# Patient Record
Sex: FEMALE | Race: BLACK OR AFRICAN AMERICAN | NOT HISPANIC OR LATINO
[De-identification: names, ages, dates, MRNs, and addresses within clinical notes are randomized per-mention and may not be internally consistent; named-entity substitution may affect disease eponyms.]

---

## 2017-04-07 ENCOUNTER — APPOINTMENT (OUTPATIENT)
Dept: INTERNAL MEDICINE | Facility: CLINIC | Age: 62
End: 2017-04-07

## 2017-04-07 VITALS
DIASTOLIC BLOOD PRESSURE: 92 MMHG | BODY MASS INDEX: 41.44 KG/M2 | HEIGHT: 67 IN | WEIGHT: 264 LBS | HEART RATE: 72 BPM | SYSTOLIC BLOOD PRESSURE: 172 MMHG

## 2017-04-27 LAB
25(OH)D3 SERPL-MCNC: 22.5 NG/ML
ALBUMIN SERPL ELPH-MCNC: 3.8 G/DL
ALP BLD-CCNC: 77 U/L
ALT SERPL-CCNC: 11 U/L
ANION GAP SERPL CALC-SCNC: 16 MMOL/L
AST SERPL-CCNC: 17 U/L
BASOPHILS # BLD AUTO: 0.01 K/UL
BASOPHILS NFR BLD AUTO: 0.1 %
BILIRUB SERPL-MCNC: 0.2 MG/DL
BUN SERPL-MCNC: 30 MG/DL
CALCIUM SERPL-MCNC: 10.1 MG/DL
CHLORIDE SERPL-SCNC: 103 MMOL/L
CHOLEST SERPL-MCNC: 194 MG/DL
CHOLEST/HDLC SERPL: 2.2 RATIO
CO2 SERPL-SCNC: 22 MMOL/L
CREAT SERPL-MCNC: 1.36 MG/DL
EOSINOPHIL # BLD AUTO: 0.19 K/UL
EOSINOPHIL NFR BLD AUTO: 2.1 %
GLUCOSE SERPL-MCNC: 82 MG/DL
HBA1C MFR BLD HPLC: 5.8 %
HCT VFR BLD CALC: 37.7 %
HDLC SERPL-MCNC: 90 MG/DL
HGB BLD-MCNC: 12 G/DL
IMM GRANULOCYTES NFR BLD AUTO: 0.2 %
LDLC SERPL CALC-MCNC: 87 MG/DL
LYMPHOCYTES # BLD AUTO: 2.64 K/UL
LYMPHOCYTES NFR BLD AUTO: 29 %
MAN DIFF?: NORMAL
MCHC RBC-ENTMCNC: 30.4 PG
MCHC RBC-ENTMCNC: 31.8 GM/DL
MCV RBC AUTO: 95.4 FL
MONOCYTES # BLD AUTO: 0.53 K/UL
MONOCYTES NFR BLD AUTO: 5.8 %
NEUTROPHILS # BLD AUTO: 5.7 K/UL
NEUTROPHILS NFR BLD AUTO: 62.8 %
PLATELET # BLD AUTO: 302 K/UL
POTASSIUM SERPL-SCNC: 3.6 MMOL/L
PROT SERPL-MCNC: 8.3 G/DL
RBC # BLD: 3.95 M/UL
RBC # FLD: 14.5 %
SODIUM SERPL-SCNC: 141 MMOL/L
T4 FREE SERPL-MCNC: 1.6 NG/DL
TRIGL SERPL-MCNC: 83 MG/DL
TSH SERPL-ACNC: 0.72 UIU/ML
WBC # FLD AUTO: 9.09 K/UL

## 2017-06-16 ENCOUNTER — INPATIENT (INPATIENT)
Facility: HOSPITAL | Age: 62
LOS: 3 days | Discharge: HOME | End: 2017-06-20
Attending: SPECIALIST | Admitting: SPECIALIST
Payer: COMMERCIAL

## 2017-06-16 VITALS
OXYGEN SATURATION: 100 % | DIASTOLIC BLOOD PRESSURE: 77 MMHG | RESPIRATION RATE: 16 BRPM | HEART RATE: 69 BPM | TEMPERATURE: 98 F | SYSTOLIC BLOOD PRESSURE: 152 MMHG

## 2017-06-16 DIAGNOSIS — K56.69 OTHER INTESTINAL OBSTRUCTION: ICD-10-CM

## 2017-06-16 LAB
ALBUMIN SERPL ELPH-MCNC: 4 G/DL — SIGNIFICANT CHANGE UP (ref 3.3–5)
ALP SERPL-CCNC: 60 U/L — SIGNIFICANT CHANGE UP (ref 40–120)
ALT FLD-CCNC: 17 U/L — SIGNIFICANT CHANGE UP (ref 4–33)
APPEARANCE UR: CLEAR — SIGNIFICANT CHANGE UP
AST SERPL-CCNC: 20 U/L — SIGNIFICANT CHANGE UP (ref 4–32)
BASE EXCESS BLDV CALC-SCNC: 2.8 MMOL/L — SIGNIFICANT CHANGE UP
BASOPHILS # BLD AUTO: 0.01 K/UL — SIGNIFICANT CHANGE UP (ref 0–0.2)
BASOPHILS NFR BLD AUTO: 0.1 % — SIGNIFICANT CHANGE UP (ref 0–2)
BILIRUB SERPL-MCNC: 0.3 MG/DL — SIGNIFICANT CHANGE UP (ref 0.2–1.2)
BILIRUB UR-MCNC: NEGATIVE — SIGNIFICANT CHANGE UP
BLOOD GAS VENOUS - CREATININE: 1.3 MG/DL — SIGNIFICANT CHANGE UP (ref 0.5–1.3)
BLOOD UR QL VISUAL: NEGATIVE — SIGNIFICANT CHANGE UP
BUN SERPL-MCNC: 15 MG/DL — SIGNIFICANT CHANGE UP (ref 7–23)
CALCIUM SERPL-MCNC: 9.7 MG/DL — SIGNIFICANT CHANGE UP (ref 8.4–10.5)
CHLORIDE BLDV-SCNC: 105 MMOL/L — SIGNIFICANT CHANGE UP (ref 96–108)
CHLORIDE SERPL-SCNC: 101 MMOL/L — SIGNIFICANT CHANGE UP (ref 98–107)
CO2 SERPL-SCNC: 22 MMOL/L — SIGNIFICANT CHANGE UP (ref 22–31)
COLOR SPEC: YELLOW — SIGNIFICANT CHANGE UP
CREAT SERPL-MCNC: 1.26 MG/DL — SIGNIFICANT CHANGE UP (ref 0.5–1.3)
EOSINOPHIL # BLD AUTO: 0 K/UL — SIGNIFICANT CHANGE UP (ref 0–0.5)
EOSINOPHIL NFR BLD AUTO: 0 % — SIGNIFICANT CHANGE UP (ref 0–6)
GAS PNL BLDV: 139 MMOL/L — SIGNIFICANT CHANGE UP (ref 136–146)
GLUCOSE BLDV-MCNC: 123 — HIGH (ref 70–99)
GLUCOSE SERPL-MCNC: 121 MG/DL — HIGH (ref 70–99)
GLUCOSE UR-MCNC: NEGATIVE — SIGNIFICANT CHANGE UP
HCO3 BLDV-SCNC: 25 MMOL/L — SIGNIFICANT CHANGE UP (ref 20–27)
HCT VFR BLD CALC: 41.3 % — SIGNIFICANT CHANGE UP (ref 34.5–45)
HCT VFR BLDV CALC: 35.4 % — SIGNIFICANT CHANGE UP (ref 34.5–45)
HGB BLD-MCNC: 13 G/DL — SIGNIFICANT CHANGE UP (ref 11.5–15.5)
HGB BLDV-MCNC: 11.5 G/DL — SIGNIFICANT CHANGE UP (ref 11.5–15.5)
IMM GRANULOCYTES NFR BLD AUTO: 0.3 % — SIGNIFICANT CHANGE UP (ref 0–1.5)
KETONES UR-MCNC: NEGATIVE — SIGNIFICANT CHANGE UP
LACTATE BLDV-MCNC: 2.5 MMOL/L — HIGH (ref 0.5–2)
LEUKOCYTE ESTERASE UR-ACNC: NEGATIVE — SIGNIFICANT CHANGE UP
LIDOCAIN IGE QN: 66.8 U/L — HIGH (ref 7–60)
LYMPHOCYTES # BLD AUTO: 1.1 K/UL — SIGNIFICANT CHANGE UP (ref 1–3.3)
LYMPHOCYTES # BLD AUTO: 9.5 % — LOW (ref 13–44)
MCHC RBC-ENTMCNC: 29.8 PG — SIGNIFICANT CHANGE UP (ref 27–34)
MCHC RBC-ENTMCNC: 31.5 % — LOW (ref 32–36)
MCV RBC AUTO: 94.7 FL — SIGNIFICANT CHANGE UP (ref 80–100)
MONOCYTES # BLD AUTO: 0.16 K/UL — SIGNIFICANT CHANGE UP (ref 0–0.9)
MONOCYTES NFR BLD AUTO: 1.4 % — LOW (ref 2–14)
NEUTROPHILS # BLD AUTO: 10.31 K/UL — HIGH (ref 1.8–7.4)
NEUTROPHILS NFR BLD AUTO: 88.7 % — HIGH (ref 43–77)
NITRITE UR-MCNC: NEGATIVE — SIGNIFICANT CHANGE UP
PCO2 BLDV: 46 MMHG — SIGNIFICANT CHANGE UP (ref 41–51)
PH BLDV: 7.39 PH — SIGNIFICANT CHANGE UP (ref 7.32–7.43)
PH UR: 7 — SIGNIFICANT CHANGE UP (ref 4.6–8)
PLATELET # BLD AUTO: 307 K/UL — SIGNIFICANT CHANGE UP (ref 150–400)
PMV BLD: 11.3 FL — SIGNIFICANT CHANGE UP (ref 7–13)
PO2 BLDV: 26 MMHG — LOW (ref 35–40)
POTASSIUM BLDV-SCNC: 3.5 MMOL/L — SIGNIFICANT CHANGE UP (ref 3.4–4.5)
POTASSIUM SERPL-MCNC: 3.6 MMOL/L — SIGNIFICANT CHANGE UP (ref 3.5–5.3)
POTASSIUM SERPL-SCNC: 3.6 MMOL/L — SIGNIFICANT CHANGE UP (ref 3.5–5.3)
PROT SERPL-MCNC: 8.7 G/DL — HIGH (ref 6–8.3)
PROT UR-MCNC: 20 — SIGNIFICANT CHANGE UP
RBC # BLD: 4.36 M/UL — SIGNIFICANT CHANGE UP (ref 3.8–5.2)
RBC # FLD: 14.2 % — SIGNIFICANT CHANGE UP (ref 10.3–14.5)
RBC CASTS # UR COMP ASSIST: SIGNIFICANT CHANGE UP (ref 0–?)
SAO2 % BLDV: 40.9 % — LOW (ref 60–85)
SODIUM SERPL-SCNC: 137 MMOL/L — SIGNIFICANT CHANGE UP (ref 135–145)
SP GR SPEC: 1.02 — SIGNIFICANT CHANGE UP (ref 1–1.03)
SQUAMOUS # UR AUTO: SIGNIFICANT CHANGE UP
UROBILINOGEN FLD QL: NORMAL E.U. — SIGNIFICANT CHANGE UP (ref 0.1–0.2)
WBC # BLD: 11.61 K/UL — HIGH (ref 3.8–10.5)
WBC # FLD AUTO: 11.61 K/UL — HIGH (ref 3.8–10.5)
WBC UR QL: SIGNIFICANT CHANGE UP (ref 0–?)

## 2017-06-16 PROCEDURE — 71010: CPT | Mod: 26

## 2017-06-16 PROCEDURE — 74176 CT ABD & PELVIS W/O CONTRAST: CPT | Mod: 26

## 2017-06-16 RX ORDER — MORPHINE SULFATE 50 MG/1
2 CAPSULE, EXTENDED RELEASE ORAL ONCE
Qty: 0 | Refills: 0 | Status: DISCONTINUED | OUTPATIENT
Start: 2017-06-16 | End: 2017-06-16

## 2017-06-16 RX ORDER — LEVOTHYROXINE SODIUM 125 MCG
55 TABLET ORAL DAILY
Qty: 0 | Refills: 0 | Status: DISCONTINUED | OUTPATIENT
Start: 2017-06-16 | End: 2017-06-17

## 2017-06-16 RX ORDER — ENOXAPARIN SODIUM 100 MG/ML
40 INJECTION SUBCUTANEOUS DAILY
Qty: 0 | Refills: 0 | Status: DISCONTINUED | OUTPATIENT
Start: 2017-06-16 | End: 2017-06-20

## 2017-06-16 RX ORDER — SODIUM CHLORIDE 9 MG/ML
1000 INJECTION, SOLUTION INTRAVENOUS
Qty: 0 | Refills: 0 | Status: DISCONTINUED | OUTPATIENT
Start: 2017-06-16 | End: 2017-06-17

## 2017-06-16 RX ORDER — ONDANSETRON 8 MG/1
4 TABLET, FILM COATED ORAL ONCE
Qty: 0 | Refills: 0 | Status: DISCONTINUED | OUTPATIENT
Start: 2017-06-16 | End: 2017-06-16

## 2017-06-16 RX ORDER — SODIUM CHLORIDE 9 MG/ML
1000 INJECTION, SOLUTION INTRAVENOUS
Qty: 0 | Refills: 0 | Status: DISCONTINUED | OUTPATIENT
Start: 2017-06-16 | End: 2017-06-16

## 2017-06-16 RX ORDER — SODIUM CHLORIDE 9 MG/ML
1000 INJECTION INTRAMUSCULAR; INTRAVENOUS; SUBCUTANEOUS ONCE
Qty: 0 | Refills: 0 | Status: COMPLETED | OUTPATIENT
Start: 2017-06-16 | End: 2017-06-16

## 2017-06-16 RX ADMIN — SODIUM CHLORIDE 2000 MILLILITER(S): 9 INJECTION INTRAMUSCULAR; INTRAVENOUS; SUBCUTANEOUS at 09:51

## 2017-06-16 RX ADMIN — ENOXAPARIN SODIUM 40 MILLIGRAM(S): 100 INJECTION SUBCUTANEOUS at 16:24

## 2017-06-16 RX ADMIN — SODIUM CHLORIDE 100 MILLILITER(S): 9 INJECTION, SOLUTION INTRAVENOUS at 16:24

## 2017-06-16 RX ADMIN — MORPHINE SULFATE 2 MILLIGRAM(S): 50 CAPSULE, EXTENDED RELEASE ORAL at 16:15

## 2017-06-16 RX ADMIN — MORPHINE SULFATE 2 MILLIGRAM(S): 50 CAPSULE, EXTENDED RELEASE ORAL at 12:05

## 2017-06-16 NOTE — ED PROVIDER NOTE - ATTENDING CONTRIBUTION TO CARE
NETO Attending Note - Dr. Dye  62 F w hx of multiple SBO presents w 1 d of severe sharp lower abd pain. Intermittent. Had large emesis this am with abdominal distension similar to past episodes of SBO.  PE: pt is alert and oriented, perrl, ent normal, membranes are moist, neck supple. no lymphadenopathy or thyroid enlargement, No JVD.  Chest clear to P&A, Heart- reg rhythm without murmur, rubs or gallops, radial pulses equal bilaterally.  Abd is soft,distended diffuse tenderness., Bowel sounds are active. no mass or organomegaly. : No CVA tenderness. Neuro:  Pt alert and oriented x 3. Perrl    Distal neurosensory is intact. Motor function is 5/5 strength bilaterally.  No focal deficits. Extremities:  No edema.  Skin: warm and dry.

## 2017-06-16 NOTE — ED ADULT NURSE NOTE - OBJECTIVE STATEMENT
Pt presents to room 26, A&Ox3, ambulatory at baseline without assistance, coming in for evaluation of diffuse abdominal pain that woke her from sleep at 11am yesterday. Reports hx of frequent sbos (7 total, last one ) requiring ng tubes for management, has not require surgical intervention.  Hx of hysterectomy, laparoscopy, partial nephrectomy,  and HTN. Last bowel movement 1 hour ago, one episode of  vomiting earlier this morning.  Last po intake 14 hrs ago.  IV established in right hand with a 22g, labs drawn and sent, call bell in reach, side rails up, bed in locked position, md evaluation in progress, will continue to monitor.

## 2017-06-16 NOTE — H&P ADULT - NSHPPHYSICALEXAM_GEN_ALL_CORE
PHYSICAL EXAM:      Constitutional:  Patient lying in bed comfortably.    Respiratory:  No tachypnea or accessory muscle use    Cardiovascular:  Regular rate    Gastrointestinal:  Abd is obese, mildly distended, TTP in B/L lower quadrants.    Extremities:  Moving all four extremities spontaneously    Vascular:    Neurological:  Moving all four extremities spontaneously    Skin:  No erthema    Lymph Nodes:    Musculoskeletal:  Full ROM of extremities    Psychiatric:  Patient crying during exam.

## 2017-06-16 NOTE — H&P ADULT - HISTORY OF PRESENT ILLNESS
Patient is a 62 year old female with a PMH of multiple SBOs that have been managed non-operatively who is presenting with abdominal pain since yesterday morning and pain for one day.  She also vomited one time this morning.  Her last BM was earlier today (she had two) and she passed gas earlier this AM as well.  The pain is located in b/l lower quadrants.  She states that this pain is similar to the pain she has had in the past when she had SBOs.  She denies having any fevers or chills.

## 2017-06-16 NOTE — ED ADULT TRIAGE NOTE - CHIEF COMPLAINT QUOTE
Patient c/o abdominal pain since yesterday with abdominal distention. N/V x1 this morning. LBM this morning but with difficulty. Has a history of SBO with NGT placement. Patient c/o abdominal pain since yesterday with abdominal distention. N/V x1 this morning. LBM this morning but with difficulty. Has a history of SBO with NGT placement. Appears uncomfortable.

## 2017-06-16 NOTE — ED ADULT NURSE NOTE - CHIEF COMPLAINT QUOTE
Patient c/o abdominal pain since yesterday with abdominal distention. N/V x1 this morning. LBM this morning but with difficulty. Has a history of SBO with NGT placement. Appears uncomfortable.

## 2017-06-16 NOTE — H&P ADULT - ASSESSMENT
Patient is a 62 year old female with SBO.  -NPO  -IVFs  -NGT to low continous suction  -No Pain medications  -Serial abdominal exams  -Lovenox for VTE px.

## 2017-06-16 NOTE — ED PROVIDER NOTE - OBJECTIVE STATEMENT
62 F w hx of multiple SBO presents w 1 d of severe sharp lower abd pain. Intermittent. Had large emesis this am. Last flatus and stool were 25 min ago. Denies fevers chills. Last admit for SBO was in Sept. This feels the same but lower down. Gets dizzy when she stands up. Last meal yesterday evening.

## 2017-06-16 NOTE — ED ADULT NURSE NOTE - PMH
Adult hypothyroidism  s/p radioactive iodine for grave's, 1996  History of kidney cancer  s/p partial nephrectomy, denies chemo and radiation  Hypertension    Osteoarthritis    Small bowel obstruction  4/2016 tx with ngt

## 2017-06-17 LAB
BASOPHILS # BLD AUTO: 0 K/UL — SIGNIFICANT CHANGE UP (ref 0–0.2)
BASOPHILS NFR BLD AUTO: 0 % — SIGNIFICANT CHANGE UP (ref 0–2)
BUN SERPL-MCNC: 13 MG/DL — SIGNIFICANT CHANGE UP (ref 7–23)
CALCIUM SERPL-MCNC: 9.3 MG/DL — SIGNIFICANT CHANGE UP (ref 8.4–10.5)
CHLORIDE SERPL-SCNC: 99 MMOL/L — SIGNIFICANT CHANGE UP (ref 98–107)
CO2 SERPL-SCNC: 24 MMOL/L — SIGNIFICANT CHANGE UP (ref 22–31)
CREAT SERPL-MCNC: 1.23 MG/DL — SIGNIFICANT CHANGE UP (ref 0.5–1.3)
EOSINOPHIL # BLD AUTO: 0.03 K/UL — SIGNIFICANT CHANGE UP (ref 0–0.5)
EOSINOPHIL NFR BLD AUTO: 0.3 % — SIGNIFICANT CHANGE UP (ref 0–6)
GLUCOSE SERPL-MCNC: 79 MG/DL — SIGNIFICANT CHANGE UP (ref 70–99)
HCT VFR BLD CALC: 39.5 % — SIGNIFICANT CHANGE UP (ref 34.5–45)
HGB BLD-MCNC: 12.4 G/DL — SIGNIFICANT CHANGE UP (ref 11.5–15.5)
IMM GRANULOCYTES NFR BLD AUTO: 0.1 % — SIGNIFICANT CHANGE UP (ref 0–1.5)
LACTATE SERPL-SCNC: 1.9 MMOL/L — SIGNIFICANT CHANGE UP (ref 0.5–2)
LYMPHOCYTES # BLD AUTO: 2.14 K/UL — SIGNIFICANT CHANGE UP (ref 1–3.3)
LYMPHOCYTES # BLD AUTO: 20.4 % — SIGNIFICANT CHANGE UP (ref 13–44)
MAGNESIUM SERPL-MCNC: 2 MG/DL — SIGNIFICANT CHANGE UP (ref 1.6–2.6)
MCHC RBC-ENTMCNC: 29.4 PG — SIGNIFICANT CHANGE UP (ref 27–34)
MCHC RBC-ENTMCNC: 31.4 % — LOW (ref 32–36)
MCV RBC AUTO: 93.6 FL — SIGNIFICANT CHANGE UP (ref 80–100)
MONOCYTES # BLD AUTO: 0.84 K/UL — SIGNIFICANT CHANGE UP (ref 0–0.9)
MONOCYTES NFR BLD AUTO: 8 % — SIGNIFICANT CHANGE UP (ref 2–14)
NEUTROPHILS # BLD AUTO: 7.46 K/UL — HIGH (ref 1.8–7.4)
NEUTROPHILS NFR BLD AUTO: 71.2 % — SIGNIFICANT CHANGE UP (ref 43–77)
PHOSPHATE SERPL-MCNC: 3.2 MG/DL — SIGNIFICANT CHANGE UP (ref 2.5–4.5)
PLATELET # BLD AUTO: 306 K/UL — SIGNIFICANT CHANGE UP (ref 150–400)
PMV BLD: 10.9 FL — SIGNIFICANT CHANGE UP (ref 7–13)
POTASSIUM SERPL-MCNC: 3.5 MMOL/L — SIGNIFICANT CHANGE UP (ref 3.5–5.3)
POTASSIUM SERPL-SCNC: 3.5 MMOL/L — SIGNIFICANT CHANGE UP (ref 3.5–5.3)
RBC # BLD: 4.22 M/UL — SIGNIFICANT CHANGE UP (ref 3.8–5.2)
RBC # FLD: 14.3 % — SIGNIFICANT CHANGE UP (ref 10.3–14.5)
SODIUM SERPL-SCNC: 141 MMOL/L — SIGNIFICANT CHANGE UP (ref 135–145)
WBC # BLD: 10.48 K/UL — SIGNIFICANT CHANGE UP (ref 3.8–10.5)
WBC # FLD AUTO: 10.48 K/UL — SIGNIFICANT CHANGE UP (ref 3.8–10.5)

## 2017-06-17 PROCEDURE — 71010: CPT | Mod: 26

## 2017-06-17 RX ORDER — LEVOTHYROXINE SODIUM 125 MCG
100 TABLET ORAL DAILY
Qty: 0 | Refills: 0 | Status: DISCONTINUED | OUTPATIENT
Start: 2017-06-17 | End: 2017-06-19

## 2017-06-17 RX ORDER — INFLUENZA VIRUS VACCINE 15; 15; 15; 15 UG/.5ML; UG/.5ML; UG/.5ML; UG/.5ML
0.5 SUSPENSION INTRAMUSCULAR ONCE
Qty: 0 | Refills: 0 | Status: DISCONTINUED | OUTPATIENT
Start: 2017-06-17 | End: 2017-06-20

## 2017-06-17 RX ORDER — MORPHINE SULFATE 50 MG/1
2 CAPSULE, EXTENDED RELEASE ORAL ONCE
Qty: 0 | Refills: 0 | Status: DISCONTINUED | OUTPATIENT
Start: 2017-06-17 | End: 2017-06-17

## 2017-06-17 RX ORDER — DEXTROSE MONOHYDRATE, SODIUM CHLORIDE, AND POTASSIUM CHLORIDE 50; .745; 4.5 G/1000ML; G/1000ML; G/1000ML
1000 INJECTION, SOLUTION INTRAVENOUS
Qty: 0 | Refills: 0 | Status: DISCONTINUED | OUTPATIENT
Start: 2017-06-17 | End: 2017-06-19

## 2017-06-17 RX ORDER — PANTOPRAZOLE SODIUM 20 MG/1
40 TABLET, DELAYED RELEASE ORAL DAILY
Qty: 0 | Refills: 0 | Status: DISCONTINUED | OUTPATIENT
Start: 2017-06-17 | End: 2017-06-19

## 2017-06-17 RX ADMIN — Medication 100 MICROGRAM(S): at 07:25

## 2017-06-17 RX ADMIN — DEXTROSE MONOHYDRATE, SODIUM CHLORIDE, AND POTASSIUM CHLORIDE 100 MILLILITER(S): 50; .745; 4.5 INJECTION, SOLUTION INTRAVENOUS at 18:10

## 2017-06-17 RX ADMIN — MORPHINE SULFATE 2 MILLIGRAM(S): 50 CAPSULE, EXTENDED RELEASE ORAL at 10:20

## 2017-06-17 RX ADMIN — ENOXAPARIN SODIUM 40 MILLIGRAM(S): 100 INJECTION SUBCUTANEOUS at 12:30

## 2017-06-17 RX ADMIN — SODIUM CHLORIDE 100 MILLILITER(S): 9 INJECTION, SOLUTION INTRAVENOUS at 08:50

## 2017-06-17 RX ADMIN — MORPHINE SULFATE 2 MILLIGRAM(S): 50 CAPSULE, EXTENDED RELEASE ORAL at 09:56

## 2017-06-17 NOTE — PROGRESS NOTE ADULT - SUBJECTIVE AND OBJECTIVE BOX
SURGERY DAILY PROGRESS NOTE:     Hospital Day: 2    Postoperative Day: x    Status post: x    Subjective:  NGT dislodged and replaced x 2.   Continues to complain on pain in abdomen.  Mild nausea, no vomiting.  No flatus or bowel movement.        Objective:    MEDICATIONS  (STANDING):  enoxaparin Injectable 40milliGRAM(s) SubCutaneous daily  levothyroxine Injectable 100MICROGram(s) IV Push daily  influenza   Vaccine 0.5milliLiter(s) IntraMuscular once  dextrose 5% + sodium chloride 0.45% with potassium chloride 20 mEq/L 1000milliLiter(s) IV Continuous <Continuous>  pantoprazole  Injectable 40milliGRAM(s) IV Push daily    MEDICATIONS  (PRN):      Vital Signs Last 24 Hrs  T(C): 37.5, Max: 37.5 ( @ 21:06)  T(F): 99.5, Max: 99.5 ( @ 21:06)  HR: 59 (51 - 61)  BP: 142/86 (120/56 - 146/76)  BP(mean): --  RR: 18 (17 - 18)  SpO2: 100% (96% - 100%)    I&O's Detail  I & Os for 24h ending 2017 07:00  =============================================  IN:    Total IN: 0 ml  ---------------------------------------------  OUT:    Voided: 450 ml    Total OUT: 450 ml  ---------------------------------------------  Total NET: -450 ml    I & Os for current day (as of 2017 23:48)  =============================================  IN:    Total IN: 0 ml  ---------------------------------------------  OUT:    Nasoenteral Tube: 200 ml    Total OUT: 200 ml  ---------------------------------------------  Total NET: -200 ml      EXAM:  Awake and alert in NAD  NGT in place draining dark red liquid  Abdomen soft, mildly distended, mildly tender diffusely    LABS:                        12.4   10.48 )-----------( 306      ( 2017 07:25 )             39.5     06-17    141  |  99  |  13  ----------------------------<  79  3.5   |  24  |  1.23    Ca    9.3      2017 06:00  Phos  3.2     06-17  Mg     2.0     -    TPro  8.7<H>  /  Alb  4.0  /  TBili  0.3  /  DBili  x   /  AST  20  /  ALT  17  /  AlkPhos  60  06-16      Urinalysis Basic - ( 2017 11:15 )    Color: YELLOW / Appearance: CLEAR / S.018 / pH: 7.0  Gluc: NEGATIVE / Ketone: NEGATIVE  / Bili: NEGATIVE / Urobili: NORMAL E.U.   Blood: NEGATIVE / Protein: 20 / Nitrite: NEGATIVE   Leuk Esterase: NEGATIVE / RBC: 0-2 / WBC 2-5   Sq Epi: OCC / Non Sq Epi: x / Bacteria: x        RADIOLOGY & ADDITIONAL STUDIES:

## 2017-06-17 NOTE — PROGRESS NOTE ADULT - ASSESSMENT
61 yo woman with history of hysterectomy and partial nephrectomy w/ multiple previous SBO's, here with SBO.    - NPO/NGT  - IVFs  - serial abdominal exams  - pain control prn  - OOB  - DVT ppx

## 2017-06-17 NOTE — PROGRESS NOTE ADULT - SUBJECTIVE AND OBJECTIVE BOX
Subjective:      Objectives:  T(C): 37, Max: 37 ( @ 18:02)  HR: 57 (51 - 57)  BP: 136/66 (120/56 - 136/66)  RR: 18 (17 - 18)  SpO2: 100% (96% - 100%)  Wt(kg): --  I & Os for 24h ending  @ 07:00  =============================================  IN:    Total IN: 0 ml  ---------------------------------------------  OUT:    Voided: 450 ml    Total OUT: 450 ml  ---------------------------------------------  Total NET: -450 ml    I & Os for current day (as of  @ 20:20)  =============================================  IN:    Total IN: 0 ml  ---------------------------------------------  OUT:    Nasoenteral Tube: 200 ml    Total OUT: 200 ml  ---------------------------------------------  Total NET: -200 ml      Physcial Exam  GENERAL: NAD, well-developed  ABDOMEN: Soft, Nontender, Nondistended; Bowel sounds present  GENITOURINARY:  WOUND:  DRAINS:  PSYCH: AAOx3    LABS:                        12.4   10.48 )-----------( 306      ( 2017 07:25 )             39.5     -17    141  |  99  |  13  ----------------------------<  79  3.5   |  24  |  1.23    Ca    9.3      2017 06:00  Phos  3.2     06-17  Mg     2.0     06-17    TPro  8.7<H>  /  Alb  4.0  /  TBili  0.3  /  DBili  x   /  AST  20  /  ALT  17  /  AlkPhos  60  06-16    CAPILLARY BLOOD GLUCOSE      CAPILLARY BLOOD GLUCOSE    Urinalysis Basic - ( 2017 11:15 )    Color: YELLOW / Appearance: CLEAR / S.018 / pH: 7.0  Gluc: NEGATIVE / Ketone: NEGATIVE  / Bili: NEGATIVE / Urobili: NORMAL E.U.   Blood: NEGATIVE / Protein: 20 / Nitrite: NEGATIVE   Leuk Esterase: NEGATIVE / RBC: 0-2 / WBC 2-5   Sq Epi: OCC / Non Sq Epi: x / Bacteria: x        ASSESSMENT:  62y Female with history of recurrent SBO.       PLAN:   -Continue NPO, NGT,   -Gifx  -Pain control.  -

## 2017-06-18 LAB
APPEARANCE UR: SIGNIFICANT CHANGE UP
BACTERIA # UR AUTO: SIGNIFICANT CHANGE UP
BILIRUB UR-MCNC: NEGATIVE — SIGNIFICANT CHANGE UP
BLOOD UR QL VISUAL: NEGATIVE — SIGNIFICANT CHANGE UP
BUN SERPL-MCNC: 11 MG/DL — SIGNIFICANT CHANGE UP (ref 7–23)
CALCIUM SERPL-MCNC: 9.2 MG/DL — SIGNIFICANT CHANGE UP (ref 8.4–10.5)
CHLORIDE SERPL-SCNC: 100 MMOL/L — SIGNIFICANT CHANGE UP (ref 98–107)
CO2 SERPL-SCNC: 25 MMOL/L — SIGNIFICANT CHANGE UP (ref 22–31)
COLOR SPEC: YELLOW — SIGNIFICANT CHANGE UP
CREAT SERPL-MCNC: 1.19 MG/DL — SIGNIFICANT CHANGE UP (ref 0.5–1.3)
GLUCOSE SERPL-MCNC: 86 MG/DL — SIGNIFICANT CHANGE UP (ref 70–99)
GLUCOSE UR-MCNC: NEGATIVE — SIGNIFICANT CHANGE UP
GRAN CASTS # UR COMP ASSIST: SIGNIFICANT CHANGE UP
HCT VFR BLD CALC: 38.8 % — SIGNIFICANT CHANGE UP (ref 34.5–45)
HGB BLD-MCNC: 12.2 G/DL — SIGNIFICANT CHANGE UP (ref 11.5–15.5)
KETONES UR-MCNC: NEGATIVE — SIGNIFICANT CHANGE UP
LEUKOCYTE ESTERASE UR-ACNC: HIGH
MAGNESIUM SERPL-MCNC: 1.9 MG/DL — SIGNIFICANT CHANGE UP (ref 1.6–2.6)
MCHC RBC-ENTMCNC: 29.5 PG — SIGNIFICANT CHANGE UP (ref 27–34)
MCHC RBC-ENTMCNC: 31.4 % — LOW (ref 32–36)
MCV RBC AUTO: 93.9 FL — SIGNIFICANT CHANGE UP (ref 80–100)
MUCOUS THREADS # UR AUTO: SIGNIFICANT CHANGE UP
NITRITE UR-MCNC: POSITIVE — HIGH
NON-SQ EPI CELLS # UR AUTO: <1 — SIGNIFICANT CHANGE UP
PH UR: 7.5 — SIGNIFICANT CHANGE UP (ref 4.6–8)
PHOSPHATE SERPL-MCNC: 2.8 MG/DL — SIGNIFICANT CHANGE UP (ref 2.5–4.5)
PLATELET # BLD AUTO: 313 K/UL — SIGNIFICANT CHANGE UP (ref 150–400)
PMV BLD: 11.2 FL — SIGNIFICANT CHANGE UP (ref 7–13)
POTASSIUM SERPL-MCNC: 3.5 MMOL/L — SIGNIFICANT CHANGE UP (ref 3.5–5.3)
POTASSIUM SERPL-SCNC: 3.5 MMOL/L — SIGNIFICANT CHANGE UP (ref 3.5–5.3)
PROT UR-MCNC: 30 — HIGH
RBC # BLD: 4.13 M/UL — SIGNIFICANT CHANGE UP (ref 3.8–5.2)
RBC # FLD: 14.3 % — SIGNIFICANT CHANGE UP (ref 10.3–14.5)
RBC CASTS # UR COMP ASSIST: SIGNIFICANT CHANGE UP (ref 0–?)
SODIUM SERPL-SCNC: 142 MMOL/L — SIGNIFICANT CHANGE UP (ref 135–145)
SP GR SPEC: 1.02 — SIGNIFICANT CHANGE UP (ref 1–1.03)
SQUAMOUS # UR AUTO: SIGNIFICANT CHANGE UP
UROBILINOGEN FLD QL: NORMAL E.U. — SIGNIFICANT CHANGE UP (ref 0.1–0.2)
WBC # BLD: 9.9 K/UL — SIGNIFICANT CHANGE UP (ref 3.8–10.5)
WBC # FLD AUTO: 9.9 K/UL — SIGNIFICANT CHANGE UP (ref 3.8–10.5)
WBC UR QL: SIGNIFICANT CHANGE UP (ref 0–?)

## 2017-06-18 RX ORDER — CEFTRIAXONE 500 MG/1
1 INJECTION, POWDER, FOR SOLUTION INTRAMUSCULAR; INTRAVENOUS ONCE
Qty: 0 | Refills: 0 | Status: COMPLETED | OUTPATIENT
Start: 2017-06-18 | End: 2017-06-18

## 2017-06-18 RX ORDER — CEFTRIAXONE 500 MG/1
INJECTION, POWDER, FOR SOLUTION INTRAMUSCULAR; INTRAVENOUS
Qty: 0 | Refills: 0 | Status: DISCONTINUED | OUTPATIENT
Start: 2017-06-18 | End: 2017-06-18

## 2017-06-18 RX ORDER — CIPROFLOXACIN LACTATE 400MG/40ML
400 VIAL (ML) INTRAVENOUS ONCE
Qty: 0 | Refills: 0 | Status: COMPLETED | OUTPATIENT
Start: 2017-06-18 | End: 2017-06-18

## 2017-06-18 RX ORDER — CIPROFLOXACIN LACTATE 400MG/40ML
VIAL (ML) INTRAVENOUS
Qty: 0 | Refills: 0 | Status: DISCONTINUED | OUTPATIENT
Start: 2017-06-18 | End: 2017-06-18

## 2017-06-18 RX ORDER — CIPROFLOXACIN LACTATE 400MG/40ML
500 VIAL (ML) INTRAVENOUS EVERY 12 HOURS
Qty: 0 | Refills: 0 | Status: DISCONTINUED | OUTPATIENT
Start: 2017-06-18 | End: 2017-06-18

## 2017-06-18 RX ORDER — MAGNESIUM SULFATE 500 MG/ML
1 VIAL (ML) INJECTION ONCE
Qty: 0 | Refills: 0 | Status: COMPLETED | OUTPATIENT
Start: 2017-06-18 | End: 2017-06-18

## 2017-06-18 RX ORDER — POTASSIUM CHLORIDE 20 MEQ
20 PACKET (EA) ORAL
Qty: 0 | Refills: 0 | Status: COMPLETED | OUTPATIENT
Start: 2017-06-18 | End: 2017-06-18

## 2017-06-18 RX ORDER — CIPROFLOXACIN LACTATE 400MG/40ML
400 VIAL (ML) INTRAVENOUS EVERY 12 HOURS
Qty: 0 | Refills: 0 | Status: DISCONTINUED | OUTPATIENT
Start: 2017-06-19 | End: 2017-06-20

## 2017-06-18 RX ORDER — CIPROFLOXACIN LACTATE 400MG/40ML
VIAL (ML) INTRAVENOUS
Qty: 0 | Refills: 0 | Status: DISCONTINUED | OUTPATIENT
Start: 2017-06-18 | End: 2017-06-20

## 2017-06-18 RX ORDER — CIPROFLOXACIN LACTATE 400MG/40ML
400 VIAL (ML) INTRAVENOUS ONCE
Qty: 0 | Refills: 0 | Status: DISCONTINUED | OUTPATIENT
Start: 2017-06-18 | End: 2017-06-18

## 2017-06-18 RX ADMIN — Medication 20 MILLIEQUIVALENT(S): at 11:30

## 2017-06-18 RX ADMIN — Medication 200 MILLIGRAM(S): at 22:50

## 2017-06-18 RX ADMIN — PANTOPRAZOLE SODIUM 40 MILLIGRAM(S): 20 TABLET, DELAYED RELEASE ORAL at 12:39

## 2017-06-18 RX ADMIN — DEXTROSE MONOHYDRATE, SODIUM CHLORIDE, AND POTASSIUM CHLORIDE 100 MILLILITER(S): 50; .745; 4.5 INJECTION, SOLUTION INTRAVENOUS at 10:44

## 2017-06-18 RX ADMIN — Medication 100 MICROGRAM(S): at 06:23

## 2017-06-18 RX ADMIN — Medication 100 GRAM(S): at 12:46

## 2017-06-18 RX ADMIN — ENOXAPARIN SODIUM 40 MILLIGRAM(S): 100 INJECTION SUBCUTANEOUS at 12:39

## 2017-06-18 NOTE — PROGRESS NOTE ADULT - SUBJECTIVE AND OBJECTIVE BOX
Seen and examined. C/o intermittent, cramping abdominal pain, improving since admission. Notes foul-smelling urine. Denies flatus.    Vital Signs Last 24 Hrs  T(C): 37.3, Max: 37.5 (06-17 @ 21:06)  T(F): 99.1, Max: 99.5 (06-17 @ 21:06)  HR: 61 (51 - 61)  BP: 133/60 (120/56 - 142/86)  BP(mean): --  RR: 18 (17 - 18)  SpO2: 100% (96% - 100%)    I&O's Summary    I & Os for current day (as of 18 Jun 2017 09:06)  =============================================  IN: 1100 ml / OUT: 350 ml / NET: 750 ml                          12.2   9.90  )-----------( 313      ( 18 Jun 2017 07:28 )             38.8   06-18    142  |  100  |  11  ----------------------------<  86  3.5   |  25  |  1.19    Ca    9.2      18 Jun 2017 07:28  Phos  2.8     06-18  Mg     1.9     06-18    EXAM: NAD, AAO x4. NGT in place with bilious material in catch. Abdomen soft, mildly distended, nontender.

## 2017-06-19 LAB
BUN SERPL-MCNC: 8 MG/DL — SIGNIFICANT CHANGE UP (ref 7–23)
CALCIUM SERPL-MCNC: 8.9 MG/DL — SIGNIFICANT CHANGE UP (ref 8.4–10.5)
CHLORIDE SERPL-SCNC: 103 MMOL/L — SIGNIFICANT CHANGE UP (ref 98–107)
CO2 SERPL-SCNC: 28 MMOL/L — SIGNIFICANT CHANGE UP (ref 22–31)
CREAT SERPL-MCNC: 1.16 MG/DL — SIGNIFICANT CHANGE UP (ref 0.5–1.3)
GLUCOSE SERPL-MCNC: 80 MG/DL — SIGNIFICANT CHANGE UP (ref 70–99)
HCT VFR BLD CALC: 36.7 % — SIGNIFICANT CHANGE UP (ref 34.5–45)
HGB BLD-MCNC: 11.5 G/DL — SIGNIFICANT CHANGE UP (ref 11.5–15.5)
MCHC RBC-ENTMCNC: 29.5 PG — SIGNIFICANT CHANGE UP (ref 27–34)
MCHC RBC-ENTMCNC: 31.3 % — LOW (ref 32–36)
MCV RBC AUTO: 94.1 FL — SIGNIFICANT CHANGE UP (ref 80–100)
PLATELET # BLD AUTO: 279 K/UL — SIGNIFICANT CHANGE UP (ref 150–400)
PMV BLD: 11.1 FL — SIGNIFICANT CHANGE UP (ref 7–13)
POTASSIUM SERPL-MCNC: 3.3 MMOL/L — LOW (ref 3.5–5.3)
POTASSIUM SERPL-SCNC: 3.3 MMOL/L — LOW (ref 3.5–5.3)
RBC # BLD: 3.9 M/UL — SIGNIFICANT CHANGE UP (ref 3.8–5.2)
RBC # FLD: 14.1 % — SIGNIFICANT CHANGE UP (ref 10.3–14.5)
SODIUM SERPL-SCNC: 142 MMOL/L — SIGNIFICANT CHANGE UP (ref 135–145)
SPECIMEN SOURCE: SIGNIFICANT CHANGE UP
WBC # BLD: 9.63 K/UL — SIGNIFICANT CHANGE UP (ref 3.8–10.5)
WBC # FLD AUTO: 9.63 K/UL — SIGNIFICANT CHANGE UP (ref 3.8–10.5)

## 2017-06-19 RX ORDER — POTASSIUM CHLORIDE 20 MEQ
20 PACKET (EA) ORAL
Qty: 0 | Refills: 0 | Status: COMPLETED | OUTPATIENT
Start: 2017-06-19 | End: 2017-06-19

## 2017-06-19 RX ORDER — PANTOPRAZOLE SODIUM 20 MG/1
40 TABLET, DELAYED RELEASE ORAL
Qty: 0 | Refills: 0 | Status: DISCONTINUED | OUTPATIENT
Start: 2017-06-19 | End: 2017-06-20

## 2017-06-19 RX ORDER — LEVOTHYROXINE SODIUM 125 MCG
200 TABLET ORAL DAILY
Qty: 0 | Refills: 0 | Status: DISCONTINUED | OUTPATIENT
Start: 2017-06-19 | End: 2017-06-20

## 2017-06-19 RX ADMIN — DEXTROSE MONOHYDRATE, SODIUM CHLORIDE, AND POTASSIUM CHLORIDE 100 MILLILITER(S): 50; .745; 4.5 INJECTION, SOLUTION INTRAVENOUS at 08:46

## 2017-06-19 RX ADMIN — Medication 200 MILLIGRAM(S): at 18:25

## 2017-06-19 RX ADMIN — PANTOPRAZOLE SODIUM 40 MILLIGRAM(S): 20 TABLET, DELAYED RELEASE ORAL at 11:32

## 2017-06-19 RX ADMIN — Medication 200 MILLIGRAM(S): at 06:50

## 2017-06-19 RX ADMIN — ENOXAPARIN SODIUM 40 MILLIGRAM(S): 100 INJECTION SUBCUTANEOUS at 11:34

## 2017-06-19 RX ADMIN — Medication 20 MILLIEQUIVALENT(S): at 13:06

## 2017-06-19 RX ADMIN — Medication 100 MICROGRAM(S): at 06:50

## 2017-06-19 RX ADMIN — Medication 20 MILLIEQUIVALENT(S): at 10:29

## 2017-06-19 NOTE — DIETITIAN INITIAL EVALUATION ADULT. - NS AS NUTRI INTERV MEALS SNACK
Diets modified for specific foods and ingredients/Other (specify)/1. Suggest: Once & when clinically indicated advance PO diet to Low Sodium, Full Liquids, then to Low Sodium, [place]/Soft;              2. Monitor PO diet tolerance;                3. Monitor labs, weights, hydration status;

## 2017-06-19 NOTE — DIETITIAN INITIAL EVALUATION ADULT. - OTHER INFO
Nutrition Consult X nausea/vomiting greater or equal to 3 days prior to admit. Pt 61 yo female appears alert, oriented. Pt with admit diagnosis: Intestinal obstruction. Pt was NPO; Clear Liquid initiate today. Pt wants to eat Regular food. Per Pt her appetite was okay PTA. No chew/swallow problem voiced; no nausea/vomiting/diarrhea reported now. PTA Pt was eating Regular food reported. Per Pt her usual body weight: 246#. No weight loss or weight change voiced. No other food related concern voiced. RDN remains available.

## 2017-06-19 NOTE — PROGRESS NOTE ADULT - SUBJECTIVE AND OBJECTIVE BOX
Patient is a 62y old  Female who presents with a chief complaint of abdominal pain (2017 15:51)       SUBJECTIVE: Pain well controlled,  no nausea/vomiting/headache/dizziness/chest pain/shortness of breath    OBJECTIVE:  PHYSICAL EXAM:  GA: NAD  Abdomen:   -  soft, non-distended, non tender     Vitals/Labs:  Vital Signs Last 24 Hrs  T(C): 36.8, Max: 37 (06-18 @ 18:54)  T(F): 98.2, Max: 98.6 (06-18 @ 18:54)  HR: 60 (56 - 64)  BP: 152/89 (116/62 - 165/86)  BP(mean): --  RR: 18 (16 - 20)  SpO2: 98% (96% - 100%)    I&O's Detail                            12.2   9.90  )-----------( 313      ( 2017 07:28 )             38.8       06-18    142  |  100  |  11  ----------------------------<  86  3.5   |  25  |  1.19    Ca    9.2      2017 07:28  Phos  2.8     06-18  Mg     1.9     06-18        CAPILLARY BLOOD GLUCOSE              Urinalysis Basic - ( 2017 10:35 )    Color: YELLOW / Appearance: HAZY / S.017 / pH: 7.5  Gluc: NEGATIVE / Ketone: NEGATIVE  / Bili: NEGATIVE / Urobili: NORMAL E.U.   Blood: NEGATIVE / Protein: 30 / Nitrite: POSITIVE   Leuk Esterase: MODERATE / RBC: 0-2 / WBC 10-25   Sq Epi: OCC / Non Sq Epi: x / Bacteria: FEW        ASSESSMENT/PLAN: CODI TAVERAS 62y Female s/p  SBO  - Adv Diet to clears   - Pain control   - Input and outputs   - DVT ppx  - OOB/incentive spirometry     MEDICATIONS  (STANDING):  enoxaparin Injectable 40milliGRAM(s) SubCutaneous daily  levothyroxine Injectable 100MICROGram(s) IV Push daily  influenza   Vaccine 0.5milliLiter(s) IntraMuscular once  dextrose 5% + sodium chloride 0.45% with potassium chloride 20 mEq/L 1000milliLiter(s) IV Continuous <Continuous>  pantoprazole  Injectable 40milliGRAM(s) IV Push daily  ciprofloxacin   IVPB  IV Intermittent   ciprofloxacin   IVPB 400milliGRAM(s) IV Intermittent every 12 hours    MEDICATIONS  (PRN):

## 2017-06-20 ENCOUNTER — TRANSCRIPTION ENCOUNTER (OUTPATIENT)
Age: 62
End: 2017-06-20

## 2017-06-20 VITALS
RESPIRATION RATE: 18 BRPM | DIASTOLIC BLOOD PRESSURE: 86 MMHG | HEART RATE: 76 BPM | OXYGEN SATURATION: 99 % | TEMPERATURE: 99 F | SYSTOLIC BLOOD PRESSURE: 165 MMHG

## 2017-06-20 LAB
-  AMIKACIN: SIGNIFICANT CHANGE UP
-  AMPICILLIN/SULBACTAM: SIGNIFICANT CHANGE UP
-  AMPICILLIN: SIGNIFICANT CHANGE UP
-  AZTREONAM: SIGNIFICANT CHANGE UP
-  CEFAZOLIN: SIGNIFICANT CHANGE UP
-  CEFEPIME: SIGNIFICANT CHANGE UP
-  CEFOXITIN: SIGNIFICANT CHANGE UP
-  CEFTAZIDIME: SIGNIFICANT CHANGE UP
-  CEFTRIAXONE: SIGNIFICANT CHANGE UP
-  CIPROFLOXACIN: SIGNIFICANT CHANGE UP
-  ERTAPENEM: SIGNIFICANT CHANGE UP
-  GENTAMICIN: SIGNIFICANT CHANGE UP
-  IMIPENEM: SIGNIFICANT CHANGE UP
-  LEVOFLOXACIN: SIGNIFICANT CHANGE UP
-  MEROPENEM: SIGNIFICANT CHANGE UP
-  NITROFURANTOIN: SIGNIFICANT CHANGE UP
-  PIPERACILLIN/TAZOBACTAM: SIGNIFICANT CHANGE UP
-  TIGECYCLINE: SIGNIFICANT CHANGE UP
-  TOBRAMYCIN: SIGNIFICANT CHANGE UP
-  TRIMETHOPRIM/SULFAMETHOXAZOLE: SIGNIFICANT CHANGE UP
BACTERIA UR CULT: SIGNIFICANT CHANGE UP
BUN SERPL-MCNC: 10 MG/DL — SIGNIFICANT CHANGE UP (ref 7–23)
CALCIUM SERPL-MCNC: 9.3 MG/DL — SIGNIFICANT CHANGE UP (ref 8.4–10.5)
CHLORIDE SERPL-SCNC: 103 MMOL/L — SIGNIFICANT CHANGE UP (ref 98–107)
CO2 SERPL-SCNC: 23 MMOL/L — SIGNIFICANT CHANGE UP (ref 22–31)
CREAT SERPL-MCNC: 1.26 MG/DL — SIGNIFICANT CHANGE UP (ref 0.5–1.3)
GLUCOSE SERPL-MCNC: 96 MG/DL — SIGNIFICANT CHANGE UP (ref 70–99)
MAGNESIUM SERPL-MCNC: 1.8 MG/DL — SIGNIFICANT CHANGE UP (ref 1.6–2.6)
METHOD TYPE: SIGNIFICANT CHANGE UP
ORGANISM # SPEC MICROSCOPIC CNT: SIGNIFICANT CHANGE UP
ORGANISM # SPEC MICROSCOPIC CNT: SIGNIFICANT CHANGE UP
PHOSPHATE SERPL-MCNC: 3.1 MG/DL — SIGNIFICANT CHANGE UP (ref 2.5–4.5)
POTASSIUM SERPL-MCNC: 4.2 MMOL/L — SIGNIFICANT CHANGE UP (ref 3.5–5.3)
POTASSIUM SERPL-SCNC: 4.2 MMOL/L — SIGNIFICANT CHANGE UP (ref 3.5–5.3)
SODIUM SERPL-SCNC: 140 MMOL/L — SIGNIFICANT CHANGE UP (ref 135–145)

## 2017-06-20 RX ORDER — LEVOTHYROXINE SODIUM 125 MCG
200 TABLET ORAL DAILY
Qty: 0 | Refills: 0 | Status: DISCONTINUED | OUTPATIENT
Start: 2017-06-20 | End: 2017-06-20

## 2017-06-20 RX ORDER — CIPROFLOXACIN LACTATE 400MG/40ML
1 VIAL (ML) INTRAVENOUS
Qty: 14 | Refills: 0 | OUTPATIENT
Start: 2017-06-20 | End: 2017-06-27

## 2017-06-20 RX ADMIN — Medication 200 MICROGRAM(S): at 06:41

## 2017-06-20 RX ADMIN — PANTOPRAZOLE SODIUM 40 MILLIGRAM(S): 20 TABLET, DELAYED RELEASE ORAL at 06:41

## 2017-06-20 RX ADMIN — Medication 200 MILLIGRAM(S): at 05:35

## 2017-06-20 NOTE — DISCHARGE NOTE ADULT - CARE PROVIDER_API CALL
Abel Mcdonough), Surgery  [address] [facility] [address] [place] [id number]  Phone: [phone]  Fax: [phone]

## 2017-06-20 NOTE — DISCHARGE NOTE ADULT - PATIENT PORTAL LINK FT
“You can access the [employer] Patient Portal, offered by [facility], by registering with the following website: [email or web address]”

## 2017-06-20 NOTE — DISCHARGE NOTE ADULT - MEDICATION SUMMARY - MEDICATIONS TO TAKE
I will START or STAY ON the medications listed below when I get home from the hospital:    verapamil 240 mg/24 hours oral capsule, extended release  -- 1 cap(s) by mouth once a day  -- Indication: For HTN    hydrochlorothiazide-valsartan 25 mg-320 mg oral tablet  -- 1 tab(s) by mouth once a day  -- Indication: For HTN    ciprofloxacin 500 mg oral tablet  -- 1 tab(s) by mouth 2 times a day MDD:2 for UTI  -- Avoid prolonged or excessive exposure to direct and/or artificial sunlight while taking this medication.  Check with your doctor before becoming pregnant.  Do not take dairy products, antacids, or iron preparations within one hour of this medication.  Finish all this medication unless otherwise directed by prescriber.  Medication should be taken with plenty of water.    -- Indication: For for UTI    levothyroxine 200 mcg (0.2 mg) oral tablet  -- 1 tab(s) by mouth once a day  -- Indication: For hypothyroidism

## 2017-06-20 NOTE — DISCHARGE NOTE ADULT - CARE PLAN
Principal Discharge DX:	Small bowel obstruction  Goal:	improved and resolved , to return to your baseline , diet as tolerated  Instructions for follow-up, activity and diet:	BATHING: Please do not submerge wound underwater. You may shower and/or sponge bathe.  ACTIVITY: No heavy lifting or straining. Otherwise, you may return to your usual level of physical activity. If you are taking narcotic pain medication (such as Percocet) DO NOT drive a car, operate machinery or make important decisions.  DIET: Return to your usual diet.  NOTIFY YOUR SURGEON IF: You have any bleeding that does not stop, any pus draining from your wound(s), any fever (over 100.4 F) or chills, persistent nausea/vomiting, persistent diarrhea, or if your pain is not controlled on your discharge pain medications.  FOLLOW-UP: Please follow up with your primary care physician in one week regarding your hospitalization  Secondary Diagnosis:	Hypertension

## 2017-06-20 NOTE — DISCHARGE NOTE ADULT - PLAN OF CARE
improved and resolved , to return to your baseline , diet as tolerated BATHING: Please do not submerge wound underwater. You may shower and/or sponge bathe.  ACTIVITY: No heavy lifting or straining. Otherwise, you may return to your usual level of physical activity. If you are taking narcotic pain medication (such as Percocet) DO NOT drive a car, operate machinery or make important decisions.  DIET: Return to your usual diet.  NOTIFY YOUR SURGEON IF: You have any bleeding that does not stop, any pus draining from your wound(s), any fever (over 100.4 F) or chills, persistent nausea/vomiting, persistent diarrhea, or if your pain is not controlled on your discharge pain medications.  FOLLOW-UP: Please follow up with your primary care physician in one week regarding your hospitalization

## 2017-06-20 NOTE — PROGRESS NOTE ADULT - ATTENDING COMMENTS
Above noted. Advance diet.
Above noted. Has "gas pain". Passing flatus, tolerating oral intake.  Plan: Discharge today.
Above noted, passed flatus, had two bowel movements. denies abdominal pain.  Nasogastric tube was removed.  Full liquid diet in the morning if bowel function continues, and she remains asymptomatic.

## 2017-06-20 NOTE — PROGRESS NOTE ADULT - SUBJECTIVE AND OBJECTIVE BOX
Patient is a 62y old  Female who presents with a chief complaint of abdominal pain (2017 15:51) w/ SBO       SUBJECTIVE: Pt tolerated diet. Positive Flatus , Positive Bowel movement . No acute events overnight.  pain controlled, no nausea/vomiting/headache/dizziness/chest pain/shortness of breath    OBJECTIVE:  PHYSICAL EXAM:  GA: NAD  Abdomen:  -  soft, non-distended, minimal tenderness    Vitals/Labs:  Vital Signs Last 24 Hrs  T(C): 37, Max: 37 ( @ 05:31)  T(F): 98.6, Max: 98.6 ( @ 05:31)  HR: 65 (56 - 72)  BP: 140/77 (126/71 - 155/83)  BP(mean): --  RR: 18 (18 - 18)  SpO2: 100% (99% - 100%)    I&O's Detail    I & Os for current day (as of 2017 07:41)  =============================================  IN:    Oral Fluid: 640 ml    Total IN: 640 ml  ---------------------------------------------  OUT:    Voided: 300 ml    Total OUT: 300 ml  ---------------------------------------------  Total NET: 340 ml                            11.5   9.63  )-----------( 279      ( 2017 05:40 )             36.7       06-20    140  |  103  |  10  ----------------------------<  96  4.2   |  23  |  1.26    Ca    9.3      2017 06:00  Phos  3.1     06-20  Mg     1.8     06-20        CAPILLARY BLOOD GLUCOSE              Urinalysis Basic - ( 2017 10:35 )    Color: YELLOW / Appearance: HAZY / S.017 / pH: 7.5  Gluc: NEGATIVE / Ketone: NEGATIVE  / Bili: NEGATIVE / Urobili: NORMAL E.U.   Blood: NEGATIVE / Protein: 30 / Nitrite: POSITIVE   Leuk Esterase: MODERATE / RBC: 0-2 / WBC 10-25   Sq Epi: OCC / Non Sq Epi: x / Bacteria: FEW        ASSESSMENT/PLAN: CODI TAVERAS 62y Female s/p  SBO   - Diet: LRD  - Pain control   - Input and outputs   - DVT ppx  - OOB/incentive spirometry   - Discharge planning     MEDICATIONS  (STANDING):  enoxaparin Injectable 40milliGRAM(s) SubCutaneous daily  influenza   Vaccine 0.5milliLiter(s) IntraMuscular once  ciprofloxacin   IVPB  IV Intermittent   ciprofloxacin   IVPB 400milliGRAM(s) IV Intermittent every 12 hours  levothyroxine 200MICROGram(s) Oral daily  pantoprazole    Tablet 40milliGRAM(s) Oral before breakfast    MEDICATIONS  (PRN):      A team   [id number]

## 2017-06-23 ENCOUNTER — APPOINTMENT (OUTPATIENT)
Dept: INTERNAL MEDICINE | Facility: CLINIC | Age: 62
End: 2017-06-23

## 2017-06-23 VITALS
WEIGHT: 264 LBS | HEIGHT: 67 IN | DIASTOLIC BLOOD PRESSURE: 80 MMHG | BODY MASS INDEX: 41.44 KG/M2 | SYSTOLIC BLOOD PRESSURE: 158 MMHG

## 2017-06-23 RX ORDER — SULFAMETHOXAZOLE AND TRIMETHOPRIM 800; 160 MG/1; MG/1
800-160 TABLET ORAL
Qty: 14 | Refills: 0 | Status: COMPLETED | COMMUNITY
Start: 2017-06-23 | End: 2017-06-30

## 2017-08-21 ENCOUNTER — INPATIENT (INPATIENT)
Facility: HOSPITAL | Age: 62
LOS: 5 days | Discharge: HOME | End: 2017-08-27
Attending: SPECIALIST | Admitting: SPECIALIST
Payer: COMMERCIAL

## 2017-08-21 VITALS
OXYGEN SATURATION: 100 % | HEART RATE: 60 BPM | TEMPERATURE: 97 F | RESPIRATION RATE: 20 BRPM | DIASTOLIC BLOOD PRESSURE: 76 MMHG | SYSTOLIC BLOOD PRESSURE: 132 MMHG

## 2017-08-21 RX ORDER — MORPHINE SULFATE 50 MG/1
4 CAPSULE, EXTENDED RELEASE ORAL ONCE
Qty: 0 | Refills: 0 | Status: DISCONTINUED | OUTPATIENT
Start: 2017-08-21 | End: 2017-08-21

## 2017-08-21 RX ORDER — SODIUM CHLORIDE 9 MG/ML
1000 INJECTION INTRAMUSCULAR; INTRAVENOUS; SUBCUTANEOUS ONCE
Qty: 0 | Refills: 0 | Status: COMPLETED | OUTPATIENT
Start: 2017-08-21 | End: 2017-08-21

## 2017-08-21 RX ORDER — ONDANSETRON 8 MG/1
4 TABLET, FILM COATED ORAL ONCE
Qty: 0 | Refills: 0 | Status: COMPLETED | OUTPATIENT
Start: 2017-08-21 | End: 2017-08-21

## 2017-08-21 RX ADMIN — SODIUM CHLORIDE 1000 MILLILITER(S): 9 INJECTION INTRAMUSCULAR; INTRAVENOUS; SUBCUTANEOUS at 23:45

## 2017-08-21 RX ADMIN — MORPHINE SULFATE 4 MILLIGRAM(S): 50 CAPSULE, EXTENDED RELEASE ORAL at 23:45

## 2017-08-21 RX ADMIN — ONDANSETRON 4 MILLIGRAM(S): 8 TABLET, FILM COATED ORAL at 23:45

## 2017-08-21 NOTE — ED ADULT NURSE NOTE - OBJECTIVE STATEMENT
Juan Francisco RN: 63 y/o female pt, received in room 18, aox3, ambulatory, presents to the ed with c/o "belly pain all over" that started at 1:30pm today with n/v and dizziness. Pt reports she's had multiple episodes of SBO and NGT placement in the past. Pt denies any chest pain, SOB, fever, chills. Last BM was today at 2pm. Pt was seen by MD, 20G on L AC placed, labs sent, medicated as ordered, VS as noted, NAD, report given to primary RN MPink. Juan Francisco RN: 61 y/o female pt, received in room 18, aox3, ambulatory, presents to the ed with c/o "belly pain all over" that started at 1:30pm today with n/v and dizziness. Pt reports she's had multiple episodes of SBO and NGT placement in the past. Pt denies any chest pain, SOB, fever, chills. Last BM was today at 230pm. Pt was seen by MD, 20G on L AC placed, labs sent, medicated as ordered, VS as noted, NAD, report given to primary RN Cuauhtemocnk.

## 2017-08-21 NOTE — ED ADULT NURSE NOTE - CHIEF COMPLAINT QUOTE
Patient complaining of abdominal pain since 1pm today.  Patient appears uncomfortable and complaining of severe abdominal pain.  Complaining of nausea, dizziness and vomiting.  Denies SOB or chest pain.  Patient was at [facility] 2 months ago with similar symptoms.  PMHX: several abdominal surgeries and SBO.  Meds: Verapamil, losartan and levothyroxine

## 2017-08-21 NOTE — ED ADULT TRIAGE NOTE - CHIEF COMPLAINT QUOTE
Patient complaining of abdominal pain since 1pm today.  Patient appears uncomfortable and complaining of severe abdominal pain.  Complaining of nausea, dizziness and vomiting.  Denies SOB or chest pain.  Patient was at [facility] 2 months ago with similar symptoms.  PMHX: several abdominal surgeries and SBO.  Meds: Verapamil, losartan and levothyroxine

## 2017-08-22 DIAGNOSIS — K56.69 OTHER INTESTINAL OBSTRUCTION: ICD-10-CM

## 2017-08-22 LAB
ALBUMIN SERPL ELPH-MCNC: 4 G/DL — SIGNIFICANT CHANGE UP (ref 3.3–5)
ALP SERPL-CCNC: 59 U/L — SIGNIFICANT CHANGE UP (ref 40–120)
ALT FLD-CCNC: 13 U/L — SIGNIFICANT CHANGE UP (ref 4–33)
AST SERPL-CCNC: 27 U/L — SIGNIFICANT CHANGE UP (ref 4–32)
BASE EXCESS BLDV CALC-SCNC: 3.3 MMOL/L — SIGNIFICANT CHANGE UP
BASOPHILS # BLD AUTO: 0.01 K/UL — SIGNIFICANT CHANGE UP (ref 0–0.2)
BASOPHILS NFR BLD AUTO: 0.1 % — SIGNIFICANT CHANGE UP (ref 0–2)
BILIRUB SERPL-MCNC: 0.4 MG/DL — SIGNIFICANT CHANGE UP (ref 0.2–1.2)
BLD GP AB SCN SERPL QL: NEGATIVE — SIGNIFICANT CHANGE UP
BLOOD GAS VENOUS - CREATININE: 1.2 MG/DL — SIGNIFICANT CHANGE UP (ref 0.5–1.3)
BUN SERPL-MCNC: 12 MG/DL — SIGNIFICANT CHANGE UP (ref 7–23)
BUN SERPL-MCNC: 15 MG/DL — SIGNIFICANT CHANGE UP (ref 7–23)
CALCIUM SERPL-MCNC: 10 MG/DL — SIGNIFICANT CHANGE UP (ref 8.4–10.5)
CALCIUM SERPL-MCNC: 9 MG/DL — SIGNIFICANT CHANGE UP (ref 8.4–10.5)
CHLORIDE BLDV-SCNC: 105 MMOL/L — SIGNIFICANT CHANGE UP (ref 96–108)
CHLORIDE SERPL-SCNC: 101 MMOL/L — SIGNIFICANT CHANGE UP (ref 98–107)
CHLORIDE SERPL-SCNC: 103 MMOL/L — SIGNIFICANT CHANGE UP (ref 98–107)
CO2 SERPL-SCNC: 22 MMOL/L — SIGNIFICANT CHANGE UP (ref 22–31)
CO2 SERPL-SCNC: 28 MMOL/L — SIGNIFICANT CHANGE UP (ref 22–31)
CREAT SERPL-MCNC: 1.22 MG/DL — SIGNIFICANT CHANGE UP (ref 0.5–1.3)
CREAT SERPL-MCNC: 1.23 MG/DL — SIGNIFICANT CHANGE UP (ref 0.5–1.3)
EOSINOPHIL # BLD AUTO: 0.01 K/UL — SIGNIFICANT CHANGE UP (ref 0–0.5)
EOSINOPHIL NFR BLD AUTO: 0.1 % — SIGNIFICANT CHANGE UP (ref 0–6)
GAS PNL BLDV: 141 MMOL/L — SIGNIFICANT CHANGE UP (ref 136–146)
GLUCOSE BLDV-MCNC: 111 — HIGH (ref 70–99)
GLUCOSE SERPL-MCNC: 111 MG/DL — HIGH (ref 70–99)
GLUCOSE SERPL-MCNC: 98 MG/DL — SIGNIFICANT CHANGE UP (ref 70–99)
HCO3 BLDV-SCNC: 26 MMOL/L — SIGNIFICANT CHANGE UP (ref 20–27)
HCT VFR BLD CALC: 35.7 % — SIGNIFICANT CHANGE UP (ref 34.5–45)
HCT VFR BLD CALC: 38 % — SIGNIFICANT CHANGE UP (ref 34.5–45)
HCT VFR BLDV CALC: 37.8 % — SIGNIFICANT CHANGE UP (ref 34.5–45)
HGB BLD-MCNC: 11.4 G/DL — LOW (ref 11.5–15.5)
HGB BLD-MCNC: 12.2 G/DL — SIGNIFICANT CHANGE UP (ref 11.5–15.5)
HGB BLDV-MCNC: 12.3 G/DL — SIGNIFICANT CHANGE UP (ref 11.5–15.5)
IMM GRANULOCYTES # BLD AUTO: 0.03 # — SIGNIFICANT CHANGE UP
IMM GRANULOCYTES NFR BLD AUTO: 0.3 % — SIGNIFICANT CHANGE UP (ref 0–1.5)
LACTATE BLDV-MCNC: 1.8 MMOL/L — SIGNIFICANT CHANGE UP (ref 0.5–2)
LYMPHOCYTES # BLD AUTO: 0.84 K/UL — LOW (ref 1–3.3)
LYMPHOCYTES # BLD AUTO: 9.2 % — LOW (ref 13–44)
MAGNESIUM SERPL-MCNC: 2.1 MG/DL — SIGNIFICANT CHANGE UP (ref 1.6–2.6)
MCHC RBC-ENTMCNC: 29.5 PG — SIGNIFICANT CHANGE UP (ref 27–34)
MCHC RBC-ENTMCNC: 29.7 PG — SIGNIFICANT CHANGE UP (ref 27–34)
MCHC RBC-ENTMCNC: 31.9 % — LOW (ref 32–36)
MCHC RBC-ENTMCNC: 32.1 % — SIGNIFICANT CHANGE UP (ref 32–36)
MCV RBC AUTO: 91.8 FL — SIGNIFICANT CHANGE UP (ref 80–100)
MCV RBC AUTO: 93 FL — SIGNIFICANT CHANGE UP (ref 80–100)
MONOCYTES # BLD AUTO: 0.26 K/UL — SIGNIFICANT CHANGE UP (ref 0–0.9)
MONOCYTES NFR BLD AUTO: 2.9 % — SIGNIFICANT CHANGE UP (ref 2–14)
NEUTROPHILS # BLD AUTO: 7.95 K/UL — HIGH (ref 1.8–7.4)
NEUTROPHILS NFR BLD AUTO: 87.4 % — HIGH (ref 43–77)
NRBC # FLD: 0 — SIGNIFICANT CHANGE UP
NRBC # FLD: 0 — SIGNIFICANT CHANGE UP
PCO2 BLDV: 44 MMHG — SIGNIFICANT CHANGE UP (ref 41–51)
PH BLDV: 7.41 PH — SIGNIFICANT CHANGE UP (ref 7.32–7.43)
PHOSPHATE SERPL-MCNC: 3.6 MG/DL — SIGNIFICANT CHANGE UP (ref 2.5–4.5)
PLATELET # BLD AUTO: 291 K/UL — SIGNIFICANT CHANGE UP (ref 150–400)
PLATELET # BLD AUTO: 319 K/UL — SIGNIFICANT CHANGE UP (ref 150–400)
PMV BLD: 11.3 FL — SIGNIFICANT CHANGE UP (ref 7–13)
PMV BLD: 11.5 FL — SIGNIFICANT CHANGE UP (ref 7–13)
PO2 BLDV: 31 MMHG — LOW (ref 35–40)
POTASSIUM BLDV-SCNC: 4 MMOL/L — SIGNIFICANT CHANGE UP (ref 3.4–4.5)
POTASSIUM SERPL-MCNC: 3.5 MMOL/L — SIGNIFICANT CHANGE UP (ref 3.5–5.3)
POTASSIUM SERPL-MCNC: 3.8 MMOL/L — SIGNIFICANT CHANGE UP (ref 3.5–5.3)
POTASSIUM SERPL-SCNC: 3.5 MMOL/L — SIGNIFICANT CHANGE UP (ref 3.5–5.3)
POTASSIUM SERPL-SCNC: 3.8 MMOL/L — SIGNIFICANT CHANGE UP (ref 3.5–5.3)
PROT SERPL-MCNC: 8.6 G/DL — HIGH (ref 6–8.3)
RBC # BLD: 3.84 M/UL — SIGNIFICANT CHANGE UP (ref 3.8–5.2)
RBC # BLD: 4.14 M/UL — SIGNIFICANT CHANGE UP (ref 3.8–5.2)
RBC # FLD: 14.6 % — HIGH (ref 10.3–14.5)
RBC # FLD: 14.7 % — HIGH (ref 10.3–14.5)
RH IG SCN BLD-IMP: POSITIVE — SIGNIFICANT CHANGE UP
SAO2 % BLDV: 51.1 % — LOW (ref 60–85)
SODIUM SERPL-SCNC: 142 MMOL/L — SIGNIFICANT CHANGE UP (ref 135–145)
SODIUM SERPL-SCNC: 144 MMOL/L — SIGNIFICANT CHANGE UP (ref 135–145)
WBC # BLD: 14.38 K/UL — HIGH (ref 3.8–10.5)
WBC # BLD: 9.1 K/UL — SIGNIFICANT CHANGE UP (ref 3.8–10.5)
WBC # FLD AUTO: 14.38 K/UL — HIGH (ref 3.8–10.5)
WBC # FLD AUTO: 9.1 K/UL — SIGNIFICANT CHANGE UP (ref 3.8–10.5)

## 2017-08-22 PROCEDURE — 74000: CPT | Mod: 26

## 2017-08-22 PROCEDURE — 93010 ELECTROCARDIOGRAM REPORT: CPT

## 2017-08-22 PROCEDURE — 74177 CT ABD & PELVIS W/CONTRAST: CPT | Mod: 26

## 2017-08-22 PROCEDURE — 71010: CPT | Mod: 26

## 2017-08-22 RX ORDER — ONDANSETRON 8 MG/1
4 TABLET, FILM COATED ORAL ONCE
Qty: 0 | Refills: 0 | Status: DISCONTINUED | OUTPATIENT
Start: 2017-08-22 | End: 2017-08-27

## 2017-08-22 RX ORDER — MORPHINE SULFATE 50 MG/1
4 CAPSULE, EXTENDED RELEASE ORAL ONCE
Qty: 0 | Refills: 0 | Status: DISCONTINUED | OUTPATIENT
Start: 2017-08-22 | End: 2017-08-22

## 2017-08-22 RX ORDER — SODIUM CHLORIDE 9 MG/ML
1000 INJECTION, SOLUTION INTRAVENOUS
Qty: 0 | Refills: 0 | Status: DISCONTINUED | OUTPATIENT
Start: 2017-08-22 | End: 2017-08-22

## 2017-08-22 RX ORDER — HEPARIN SODIUM 5000 [USP'U]/ML
5000 INJECTION INTRAVENOUS; SUBCUTANEOUS EVERY 8 HOURS
Qty: 0 | Refills: 0 | Status: DISCONTINUED | OUTPATIENT
Start: 2017-08-22 | End: 2017-08-27

## 2017-08-22 RX ORDER — POTASSIUM CHLORIDE 20 MEQ
10 PACKET (EA) ORAL
Qty: 0 | Refills: 0 | Status: COMPLETED | OUTPATIENT
Start: 2017-08-22 | End: 2017-08-22

## 2017-08-22 RX ORDER — DEXTROSE MONOHYDRATE, SODIUM CHLORIDE, AND POTASSIUM CHLORIDE 50; .745; 4.5 G/1000ML; G/1000ML; G/1000ML
1000 INJECTION, SOLUTION INTRAVENOUS
Qty: 0 | Refills: 0 | Status: DISCONTINUED | OUTPATIENT
Start: 2017-08-22 | End: 2017-08-26

## 2017-08-22 RX ORDER — ONDANSETRON 8 MG/1
4 TABLET, FILM COATED ORAL ONCE
Qty: 0 | Refills: 0 | Status: COMPLETED | OUTPATIENT
Start: 2017-08-22 | End: 2017-08-22

## 2017-08-22 RX ORDER — SODIUM CHLORIDE 9 MG/ML
1000 INJECTION, SOLUTION INTRAVENOUS ONCE
Qty: 0 | Refills: 0 | Status: COMPLETED | OUTPATIENT
Start: 2017-08-22 | End: 2017-08-22

## 2017-08-22 RX ORDER — LEVOTHYROXINE SODIUM 125 MCG
100 TABLET ORAL DAILY
Qty: 0 | Refills: 0 | Status: DISCONTINUED | OUTPATIENT
Start: 2017-08-22 | End: 2017-08-26

## 2017-08-22 RX ADMIN — HEPARIN SODIUM 5000 UNIT(S): 5000 INJECTION INTRAVENOUS; SUBCUTANEOUS at 21:54

## 2017-08-22 RX ADMIN — SODIUM CHLORIDE 125 MILLILITER(S): 9 INJECTION, SOLUTION INTRAVENOUS at 13:13

## 2017-08-22 RX ADMIN — HEPARIN SODIUM 5000 UNIT(S): 5000 INJECTION INTRAVENOUS; SUBCUTANEOUS at 05:15

## 2017-08-22 RX ADMIN — MORPHINE SULFATE 4 MILLIGRAM(S): 50 CAPSULE, EXTENDED RELEASE ORAL at 00:22

## 2017-08-22 RX ADMIN — Medication 1.25 MILLIGRAM(S): at 04:27

## 2017-08-22 RX ADMIN — Medication 1.25 MILLIGRAM(S): at 10:38

## 2017-08-22 RX ADMIN — DEXTROSE MONOHYDRATE, SODIUM CHLORIDE, AND POTASSIUM CHLORIDE 125 MILLILITER(S): 50; .745; 4.5 INJECTION, SOLUTION INTRAVENOUS at 20:40

## 2017-08-22 RX ADMIN — Medication 100 MILLIEQUIVALENT(S): at 15:02

## 2017-08-22 RX ADMIN — DEXTROSE MONOHYDRATE, SODIUM CHLORIDE, AND POTASSIUM CHLORIDE 125 MILLILITER(S): 50; .745; 4.5 INJECTION, SOLUTION INTRAVENOUS at 13:38

## 2017-08-22 RX ADMIN — ONDANSETRON 4 MILLIGRAM(S): 8 TABLET, FILM COATED ORAL at 20:40

## 2017-08-22 RX ADMIN — SODIUM CHLORIDE 100 MILLILITER(S): 9 INJECTION, SOLUTION INTRAVENOUS at 03:00

## 2017-08-22 RX ADMIN — MORPHINE SULFATE 4 MILLIGRAM(S): 50 CAPSULE, EXTENDED RELEASE ORAL at 05:16

## 2017-08-22 RX ADMIN — SODIUM CHLORIDE 2000 MILLILITER(S): 9 INJECTION, SOLUTION INTRAVENOUS at 12:10

## 2017-08-22 RX ADMIN — Medication 1.25 MILLIGRAM(S): at 16:55

## 2017-08-22 RX ADMIN — SODIUM CHLORIDE 100 MILLILITER(S): 9 INJECTION, SOLUTION INTRAVENOUS at 10:38

## 2017-08-22 RX ADMIN — Medication 100 MILLIEQUIVALENT(S): at 13:39

## 2017-08-22 RX ADMIN — Medication 100 MILLIEQUIVALENT(S): at 16:54

## 2017-08-22 RX ADMIN — HEPARIN SODIUM 5000 UNIT(S): 5000 INJECTION INTRAVENOUS; SUBCUTANEOUS at 13:14

## 2017-08-22 RX ADMIN — SODIUM CHLORIDE 100 MILLILITER(S): 9 INJECTION, SOLUTION INTRAVENOUS at 05:15

## 2017-08-22 RX ADMIN — DEXTROSE MONOHYDRATE, SODIUM CHLORIDE, AND POTASSIUM CHLORIDE 125 MILLILITER(S): 50; .745; 4.5 INJECTION, SOLUTION INTRAVENOUS at 16:54

## 2017-08-22 RX ADMIN — MORPHINE SULFATE 4 MILLIGRAM(S): 50 CAPSULE, EXTENDED RELEASE ORAL at 05:24

## 2017-08-22 RX ADMIN — Medication 100 MICROGRAM(S): at 04:27

## 2017-08-22 RX ADMIN — Medication 1.25 MILLIGRAM(S): at 21:54

## 2017-08-22 RX ADMIN — ONDANSETRON 4 MILLIGRAM(S): 8 TABLET, FILM COATED ORAL at 03:03

## 2017-08-22 RX ADMIN — MORPHINE SULFATE 4 MILLIGRAM(S): 50 CAPSULE, EXTENDED RELEASE ORAL at 03:02

## 2017-08-22 RX ADMIN — MORPHINE SULFATE 4 MILLIGRAM(S): 50 CAPSULE, EXTENDED RELEASE ORAL at 03:41

## 2017-08-22 NOTE — ED PROVIDER NOTE - PROGRESS NOTE DETAILS
Sign out follow-up: SBO on CT. General surgery resident placed 14 fr NGT. Admitted in stable condition to Dr. Christiano OCHOA.

## 2017-08-22 NOTE — H&P ADULT - NSHPLABSRESULTS_GEN_ALL_CORE
CBC Full  -  ( 21 Aug 2017 23:44 )  WBC Count : 9.10 K/uL  Hemoglobin : 12.2 g/dL  Hematocrit : 38.0 %  Platelet Count - Automated : 319 K/uL  Mean Cell Volume : 91.8 fL  Mean Cell Hemoglobin : 29.5 pg  Mean Cell Hemoglobin Concentration : 32.1 %  Auto Neutrophil # : 7.95 K/uL  Auto Lymphocyte # : 0.84 K/uL  Auto Monocyte # : 0.26 K/uL  Auto Eosinophil # : 0.01 K/uL  Auto Basophil # : 0.01 K/uL  Auto Neutrophil % : 87.4 %  Auto Lymphocyte % : 9.2 %  Auto Monocyte % : 2.9 %  Auto Eosinophil % : 0.1 %  Auto Basophil % : 0.1 %      08-21    142  |  101  |  15  ----------------------------<  111<H>  3.8   |  22  |  1.22    Ca    10.0      21 Aug 2017 23:44    TPro  8.6<H>  /  Alb  4.0  /  TBili  0.4  /  DBili  x   /  AST  27  /  ALT  13  /  AlkPhos  59  08-21    Lactate 1.8      Imaging    Dilated loops of bowel with distal decompressed loops, transition point likely in the LLQ. Final read pending. CBC Full  -  ( 21 Aug 2017 23:44 )  WBC Count : 9.10 K/uL  Hemoglobin : 12.2 g/dL  Hematocrit : 38.0 %  Platelet Count - Automated : 319 K/uL  Mean Cell Volume : 91.8 fL  Mean Cell Hemoglobin : 29.5 pg  Mean Cell Hemoglobin Concentration : 32.1 %  Auto Neutrophil # : 7.95 K/uL  Auto Lymphocyte # : 0.84 K/uL  Auto Monocyte # : 0.26 K/uL  Auto Eosinophil # : 0.01 K/uL  Auto Basophil # : 0.01 K/uL  Auto Neutrophil % : 87.4 %  Auto Lymphocyte % : 9.2 %  Auto Monocyte % : 2.9 %  Auto Eosinophil % : 0.1 %  Auto Basophil % : 0.1 %      08-21    142  |  101  |  15  ----------------------------<  111<H>  3.8   |  22  |  1.22    Ca    10.0      21 Aug 2017 23:44    TPro  8.6<H>  /  Alb  4.0  /  TBili  0.4  /  DBili  x   /  AST  27  /  ALT  13  /  AlkPhos  59  08-21    Lactate 1.8      Imaging  CT Abdomen and Pelvis w/ Oral Cont and w/ IV Cont (08.22.17 @ 01:55)  BOWEL: Dilated loops of small bowel are seen in the lower abdomen and   pelvis measuring up to 4.5 cm to the level of a transition point in the   right hemipelvis distal to the fecalized loops (series 2, images 83-91)   compatible with a small bowel obstruction. Enteric tube with tip in   gastric antrum. Appendix is normal.

## 2017-08-22 NOTE — ED PROVIDER NOTE - MEDICAL DECISION MAKING DETAILS
62 y.o F with pmh SBO presenting with symptoms of SBO. Will check CBC,CMP,VBG,CT abdomen/pelvis,surgery consult

## 2017-08-22 NOTE — ED PROVIDER NOTE - CONSTITUTIONAL, MLM
normal... Well appearing, well nourished, awake, alert, oriented to person, place, time/situation and visibly distressed 2/2 pain

## 2017-08-22 NOTE — ED PROVIDER NOTE - ATTENDING CONTRIBUTION TO CARE
63 yo F w hx of multiple SBO managed non surgically presents w symptoms that are consistent w her previous diagnosis of SBO. + diffuse abdominal pain, nausea, and vomiting.  Pain is diffuse 10/10. + constipation.  No fevers or chills.  PE lungs cta b/l abd + diffuse abdominal tenderness.  Neuro nonfocal exam  Impression:  SBO Admitted

## 2017-08-22 NOTE — PROVIDER CONTACT NOTE (MEDICATION) - SITUATION
Patient BP elevated (see emar), patient complaining of sharp sudden onset of abdominal pain that travels across her abdomen. NGT output has changed in color; during change of shift output was clear li

## 2017-08-22 NOTE — PROVIDER CONTACT NOTE (MEDICATION) - ACTION/TREATMENT ORDERED:
MDs & PA made aware will come up after rounds
Md aware; came to assess her; morphine 4mg IVP ordered
MD aware. will come see patient

## 2017-08-22 NOTE — H&P ADULT - ASSESSMENT
63yo F with recurrent SBO  - NPO/NGT to continuous suction  - IVF  - Home meds (IV form)  - Serial abdominal exams  - OOB  - Will discuss with Dr. Ceasar BAIRD team surgery  [id number]

## 2017-08-22 NOTE — H&P ADULT - NSHPPHYSICALEXAM_GEN_ALL_CORE
Gen: NAD  HEENT: no scleral injection, EOMI, no neck masses  CV: S1/S2  Resp: clear to auscultation bilaterally  Abdomen: soft, tender to palpation diffusely (L>R), mildly distended, obese, no guarding/rebound, no palpable masses.   Ext: warm well perfused

## 2017-08-22 NOTE — H&P ADULT - HISTORY OF PRESENT ILLNESS
61yo F with recurrent SBOs (this is her eighth admission for SBO in the last five years), now presents with abdominal pain, nausea/vomiting x 1 day. The pain began Monday afternoon around 2:30pm following lunch. She complains of dizziness. Her last BM was earlier on Monday and is currently not passing gas. In the past, she has had multiple admissions for SBOs that have all been managed non-operatively with an NGT; pt states it takes a couple of days and then she opens up. No fevers/chills, no CP/SOB.

## 2017-08-22 NOTE — PROGRESS NOTE ADULT - ASSESSMENT
A: Patient is a 62y old Woman p/w partial SBO    P:  - Pain control  - F/u labs and imaging  - Monitor GI function  - Dispo planning:    -Diet: Cont. NPO    -Activity: OOB, advance as tolerated.    -Discharge to home when stable and tolerating diet

## 2017-08-23 LAB
BUN SERPL-MCNC: 8 MG/DL — SIGNIFICANT CHANGE UP (ref 7–23)
CALCIUM SERPL-MCNC: 9.1 MG/DL — SIGNIFICANT CHANGE UP (ref 8.4–10.5)
CHLORIDE SERPL-SCNC: 103 MMOL/L — SIGNIFICANT CHANGE UP (ref 98–107)
CO2 SERPL-SCNC: 30 MMOL/L — SIGNIFICANT CHANGE UP (ref 22–31)
CREAT SERPL-MCNC: 1.19 MG/DL — SIGNIFICANT CHANGE UP (ref 0.5–1.3)
GLUCOSE SERPL-MCNC: 122 MG/DL — HIGH (ref 70–99)
HCT VFR BLD CALC: 35.1 % — SIGNIFICANT CHANGE UP (ref 34.5–45)
HGB BLD-MCNC: 11.3 G/DL — LOW (ref 11.5–15.5)
MAGNESIUM SERPL-MCNC: 1.9 MG/DL — SIGNIFICANT CHANGE UP (ref 1.6–2.6)
MCHC RBC-ENTMCNC: 30.1 PG — SIGNIFICANT CHANGE UP (ref 27–34)
MCHC RBC-ENTMCNC: 32.2 % — SIGNIFICANT CHANGE UP (ref 32–36)
MCV RBC AUTO: 93.6 FL — SIGNIFICANT CHANGE UP (ref 80–100)
NRBC # FLD: 0 — SIGNIFICANT CHANGE UP
PHOSPHATE SERPL-MCNC: 2.4 MG/DL — LOW (ref 2.5–4.5)
PLATELET # BLD AUTO: 280 K/UL — SIGNIFICANT CHANGE UP (ref 150–400)
PMV BLD: 11 FL — SIGNIFICANT CHANGE UP (ref 7–13)
POTASSIUM SERPL-MCNC: 3.8 MMOL/L — SIGNIFICANT CHANGE UP (ref 3.5–5.3)
POTASSIUM SERPL-SCNC: 3.8 MMOL/L — SIGNIFICANT CHANGE UP (ref 3.5–5.3)
RBC # BLD: 3.75 M/UL — LOW (ref 3.8–5.2)
RBC # FLD: 14.7 % — HIGH (ref 10.3–14.5)
SODIUM SERPL-SCNC: 142 MMOL/L — SIGNIFICANT CHANGE UP (ref 135–145)
WBC # BLD: 9.25 K/UL — SIGNIFICANT CHANGE UP (ref 3.8–10.5)
WBC # FLD AUTO: 9.25 K/UL — SIGNIFICANT CHANGE UP (ref 3.8–10.5)

## 2017-08-23 RX ORDER — POTASSIUM CHLORIDE 20 MEQ
10 PACKET (EA) ORAL
Qty: 0 | Refills: 0 | Status: COMPLETED | OUTPATIENT
Start: 2017-08-23 | End: 2017-08-23

## 2017-08-23 RX ADMIN — DEXTROSE MONOHYDRATE, SODIUM CHLORIDE, AND POTASSIUM CHLORIDE 125 MILLILITER(S): 50; .745; 4.5 INJECTION, SOLUTION INTRAVENOUS at 10:24

## 2017-08-23 RX ADMIN — Medication 1.25 MILLIGRAM(S): at 21:51

## 2017-08-23 RX ADMIN — Medication 1.25 MILLIGRAM(S): at 05:15

## 2017-08-23 RX ADMIN — Medication 100 MICROGRAM(S): at 06:37

## 2017-08-23 RX ADMIN — Medication 1.25 MILLIGRAM(S): at 17:10

## 2017-08-23 RX ADMIN — Medication 63.75 MILLIMOLE(S): at 14:26

## 2017-08-23 RX ADMIN — Medication 1.25 MILLIGRAM(S): at 12:27

## 2017-08-23 RX ADMIN — HEPARIN SODIUM 5000 UNIT(S): 5000 INJECTION INTRAVENOUS; SUBCUTANEOUS at 05:15

## 2017-08-23 RX ADMIN — Medication 100 MILLIEQUIVALENT(S): at 12:27

## 2017-08-23 RX ADMIN — HEPARIN SODIUM 5000 UNIT(S): 5000 INJECTION INTRAVENOUS; SUBCUTANEOUS at 14:32

## 2017-08-23 RX ADMIN — HEPARIN SODIUM 5000 UNIT(S): 5000 INJECTION INTRAVENOUS; SUBCUTANEOUS at 21:51

## 2017-08-23 RX ADMIN — Medication 100 MILLIEQUIVALENT(S): at 10:24

## 2017-08-23 RX ADMIN — Medication 100 MILLIEQUIVALENT(S): at 11:27

## 2017-08-24 LAB
BUN SERPL-MCNC: 5 MG/DL — LOW (ref 7–23)
CALCIUM SERPL-MCNC: 8.8 MG/DL — SIGNIFICANT CHANGE UP (ref 8.4–10.5)
CHLORIDE SERPL-SCNC: 102 MMOL/L — SIGNIFICANT CHANGE UP (ref 98–107)
CO2 SERPL-SCNC: 27 MMOL/L — SIGNIFICANT CHANGE UP (ref 22–31)
CREAT SERPL-MCNC: 1.16 MG/DL — SIGNIFICANT CHANGE UP (ref 0.5–1.3)
GLUCOSE SERPL-MCNC: 114 MG/DL — HIGH (ref 70–99)
HCT VFR BLD CALC: 33 % — LOW (ref 34.5–45)
HGB BLD-MCNC: 10.9 G/DL — LOW (ref 11.5–15.5)
MAGNESIUM SERPL-MCNC: 1.7 MG/DL — SIGNIFICANT CHANGE UP (ref 1.6–2.6)
MCHC RBC-ENTMCNC: 30.7 PG — SIGNIFICANT CHANGE UP (ref 27–34)
MCHC RBC-ENTMCNC: 33 % — SIGNIFICANT CHANGE UP (ref 32–36)
MCV RBC AUTO: 93 FL — SIGNIFICANT CHANGE UP (ref 80–100)
NRBC # FLD: 0 — SIGNIFICANT CHANGE UP
PHOSPHATE SERPL-MCNC: 2.5 MG/DL — SIGNIFICANT CHANGE UP (ref 2.5–4.5)
PLATELET # BLD AUTO: 276 K/UL — SIGNIFICANT CHANGE UP (ref 150–400)
PMV BLD: 11.4 FL — SIGNIFICANT CHANGE UP (ref 7–13)
POTASSIUM SERPL-MCNC: 3.5 MMOL/L — SIGNIFICANT CHANGE UP (ref 3.5–5.3)
POTASSIUM SERPL-SCNC: 3.5 MMOL/L — SIGNIFICANT CHANGE UP (ref 3.5–5.3)
RBC # BLD: 3.55 M/UL — LOW (ref 3.8–5.2)
RBC # FLD: 14.6 % — HIGH (ref 10.3–14.5)
SODIUM SERPL-SCNC: 141 MMOL/L — SIGNIFICANT CHANGE UP (ref 135–145)
WBC # BLD: 8.89 K/UL — SIGNIFICANT CHANGE UP (ref 3.8–10.5)
WBC # FLD AUTO: 8.89 K/UL — SIGNIFICANT CHANGE UP (ref 3.8–10.5)

## 2017-08-24 RX ORDER — POTASSIUM PHOSPHATE, MONOBASIC POTASSIUM PHOSPHATE, DIBASIC 236; 224 MG/ML; MG/ML
15 INJECTION, SOLUTION INTRAVENOUS ONCE
Qty: 0 | Refills: 0 | Status: COMPLETED | OUTPATIENT
Start: 2017-08-24 | End: 2017-08-24

## 2017-08-24 RX ORDER — POTASSIUM CHLORIDE 20 MEQ
10 PACKET (EA) ORAL
Qty: 0 | Refills: 0 | Status: COMPLETED | OUTPATIENT
Start: 2017-08-24 | End: 2017-08-24

## 2017-08-24 RX ORDER — MAGNESIUM SULFATE 500 MG/ML
2 VIAL (ML) INJECTION ONCE
Qty: 0 | Refills: 0 | Status: COMPLETED | OUTPATIENT
Start: 2017-08-24 | End: 2017-08-24

## 2017-08-24 RX ADMIN — HEPARIN SODIUM 5000 UNIT(S): 5000 INJECTION INTRAVENOUS; SUBCUTANEOUS at 05:10

## 2017-08-24 RX ADMIN — Medication 1.25 MILLIGRAM(S): at 21:54

## 2017-08-24 RX ADMIN — Medication 1.25 MILLIGRAM(S): at 05:10

## 2017-08-24 RX ADMIN — DEXTROSE MONOHYDRATE, SODIUM CHLORIDE, AND POTASSIUM CHLORIDE 125 MILLILITER(S): 50; .745; 4.5 INJECTION, SOLUTION INTRAVENOUS at 11:56

## 2017-08-24 RX ADMIN — Medication 100 MILLIEQUIVALENT(S): at 11:56

## 2017-08-24 RX ADMIN — HEPARIN SODIUM 5000 UNIT(S): 5000 INJECTION INTRAVENOUS; SUBCUTANEOUS at 17:58

## 2017-08-24 RX ADMIN — DEXTROSE MONOHYDRATE, SODIUM CHLORIDE, AND POTASSIUM CHLORIDE 125 MILLILITER(S): 50; .745; 4.5 INJECTION, SOLUTION INTRAVENOUS at 21:54

## 2017-08-24 RX ADMIN — POTASSIUM PHOSPHATE, MONOBASIC POTASSIUM PHOSPHATE, DIBASIC 62.5 MILLIMOLE(S): 236; 224 INJECTION, SOLUTION INTRAVENOUS at 13:21

## 2017-08-24 RX ADMIN — HEPARIN SODIUM 5000 UNIT(S): 5000 INJECTION INTRAVENOUS; SUBCUTANEOUS at 21:54

## 2017-08-24 RX ADMIN — Medication 1.25 MILLIGRAM(S): at 11:57

## 2017-08-24 RX ADMIN — Medication 50 GRAM(S): at 10:11

## 2017-08-24 RX ADMIN — Medication 100 MICROGRAM(S): at 05:10

## 2017-08-24 RX ADMIN — Medication 100 MILLIEQUIVALENT(S): at 12:20

## 2017-08-24 RX ADMIN — Medication 1.25 MILLIGRAM(S): at 17:59

## 2017-08-24 NOTE — DIETITIAN INITIAL EVALUATION ADULT. - ORAL INTAKE PTA
Regular diet. States she made a lot of changes to her diet but generally her favorite are raw vegetables/good

## 2017-08-24 NOTE — DIETITIAN INITIAL EVALUATION ADULT. - NS AS NUTRI INTERV MEALS SNACK
Fiber - modified diet/General/healthful diet/1) Advance diet as tolerated--> Clear Liquids --> Low Fiber diet     2) Suggest outpatient follow up with appropriate RD for purposes of long-term nutrition evaluation.

## 2017-08-24 NOTE — DIETITIAN INITIAL EVALUATION ADULT. - OTHER INFO
Nutrition consult received for nausea, vomiting > 3 days PTA; admitted for SBO now NPO with NGT for suctioning. Met with patient, reports feeling nauseous still today. Patient explains that she has had history of recurrent SBO which Nutrition consult received for nausea, vomiting > 3 days PTA; admitted for SBO now NPO with NGT for suctioning. Met with patient, reports feeling nauseous still today. Patient explains that she has had history of recurrent SBO which has caused her to make significant changes in her dietary habits and weight management. She cannot recall weight history or UBW as she has undergone frequent hospitalizations for SBO. Patient is familiar with progression of diet anticipated. Low Fiber diet reviewed.

## 2017-08-25 ENCOUNTER — APPOINTMENT (OUTPATIENT)
Dept: ORTHOPEDIC SURGERY | Facility: CLINIC | Age: 62
End: 2017-08-25

## 2017-08-25 LAB
BUN SERPL-MCNC: 3 MG/DL — LOW (ref 7–23)
CALCIUM SERPL-MCNC: 9.2 MG/DL — SIGNIFICANT CHANGE UP (ref 8.4–10.5)
CHLORIDE SERPL-SCNC: 106 MMOL/L — SIGNIFICANT CHANGE UP (ref 98–107)
CO2 SERPL-SCNC: 23 MMOL/L — SIGNIFICANT CHANGE UP (ref 22–31)
CREAT SERPL-MCNC: 1.1 MG/DL — SIGNIFICANT CHANGE UP (ref 0.5–1.3)
GLUCOSE SERPL-MCNC: 90 MG/DL — SIGNIFICANT CHANGE UP (ref 70–99)
HCT VFR BLD CALC: 34.4 % — LOW (ref 34.5–45)
HGB BLD-MCNC: 11.2 G/DL — LOW (ref 11.5–15.5)
MAGNESIUM SERPL-MCNC: 2 MG/DL — SIGNIFICANT CHANGE UP (ref 1.6–2.6)
MCHC RBC-ENTMCNC: 30.1 PG — SIGNIFICANT CHANGE UP (ref 27–34)
MCHC RBC-ENTMCNC: 32.6 % — SIGNIFICANT CHANGE UP (ref 32–36)
MCV RBC AUTO: 92.5 FL — SIGNIFICANT CHANGE UP (ref 80–100)
NRBC # FLD: 0 — SIGNIFICANT CHANGE UP
PHOSPHATE SERPL-MCNC: 2.9 MG/DL — SIGNIFICANT CHANGE UP (ref 2.5–4.5)
PLATELET # BLD AUTO: 286 K/UL — SIGNIFICANT CHANGE UP (ref 150–400)
PMV BLD: 10.7 FL — SIGNIFICANT CHANGE UP (ref 7–13)
POTASSIUM SERPL-MCNC: 3.8 MMOL/L — SIGNIFICANT CHANGE UP (ref 3.5–5.3)
POTASSIUM SERPL-SCNC: 3.8 MMOL/L — SIGNIFICANT CHANGE UP (ref 3.5–5.3)
RBC # BLD: 3.72 M/UL — LOW (ref 3.8–5.2)
RBC # FLD: 14.6 % — HIGH (ref 10.3–14.5)
SODIUM SERPL-SCNC: 142 MMOL/L — SIGNIFICANT CHANGE UP (ref 135–145)
WBC # BLD: 10.29 K/UL — SIGNIFICANT CHANGE UP (ref 3.8–10.5)
WBC # FLD AUTO: 10.29 K/UL — SIGNIFICANT CHANGE UP (ref 3.8–10.5)

## 2017-08-25 RX ORDER — POTASSIUM CHLORIDE 20 MEQ
10 PACKET (EA) ORAL
Qty: 0 | Refills: 0 | Status: COMPLETED | OUTPATIENT
Start: 2017-08-25 | End: 2017-08-25

## 2017-08-25 RX ADMIN — Medication 100 MILLIEQUIVALENT(S): at 14:43

## 2017-08-25 RX ADMIN — DEXTROSE MONOHYDRATE, SODIUM CHLORIDE, AND POTASSIUM CHLORIDE 125 MILLILITER(S): 50; .745; 4.5 INJECTION, SOLUTION INTRAVENOUS at 10:26

## 2017-08-25 RX ADMIN — Medication 100 MICROGRAM(S): at 05:31

## 2017-08-25 RX ADMIN — Medication 1.25 MILLIGRAM(S): at 10:26

## 2017-08-25 RX ADMIN — Medication 1.25 MILLIGRAM(S): at 18:34

## 2017-08-25 RX ADMIN — HEPARIN SODIUM 5000 UNIT(S): 5000 INJECTION INTRAVENOUS; SUBCUTANEOUS at 05:31

## 2017-08-25 RX ADMIN — Medication 1.25 MILLIGRAM(S): at 05:31

## 2017-08-25 RX ADMIN — Medication 100 MILLIEQUIVALENT(S): at 13:34

## 2017-08-25 RX ADMIN — HEPARIN SODIUM 5000 UNIT(S): 5000 INJECTION INTRAVENOUS; SUBCUTANEOUS at 13:35

## 2017-08-25 NOTE — PROGRESS NOTE ADULT - ASSESSMENT
62F with SBO, now with return of GI function. Pt is hemodynamically stable with improved exam.     - Diet: NPO, continue NGT  - F/u NGT output  - F/u GI function  - OOB  - DVT ppx   - IS  - dispo: stable

## 2017-08-26 ENCOUNTER — TRANSCRIPTION ENCOUNTER (OUTPATIENT)
Age: 62
End: 2017-08-26

## 2017-08-26 LAB
BUN SERPL-MCNC: 4 MG/DL — LOW (ref 7–23)
CALCIUM SERPL-MCNC: 9.1 MG/DL — SIGNIFICANT CHANGE UP (ref 8.4–10.5)
CHLORIDE SERPL-SCNC: 105 MMOL/L — SIGNIFICANT CHANGE UP (ref 98–107)
CO2 SERPL-SCNC: 24 MMOL/L — SIGNIFICANT CHANGE UP (ref 22–31)
CREAT SERPL-MCNC: 1.22 MG/DL — SIGNIFICANT CHANGE UP (ref 0.5–1.3)
GLUCOSE SERPL-MCNC: 92 MG/DL — SIGNIFICANT CHANGE UP (ref 70–99)
HCT VFR BLD CALC: 34.4 % — LOW (ref 34.5–45)
HGB BLD-MCNC: 11 G/DL — LOW (ref 11.5–15.5)
MAGNESIUM SERPL-MCNC: 1.8 MG/DL — SIGNIFICANT CHANGE UP (ref 1.6–2.6)
MCHC RBC-ENTMCNC: 29.6 PG — SIGNIFICANT CHANGE UP (ref 27–34)
MCHC RBC-ENTMCNC: 32 % — SIGNIFICANT CHANGE UP (ref 32–36)
MCV RBC AUTO: 92.5 FL — SIGNIFICANT CHANGE UP (ref 80–100)
NRBC # FLD: 0 — SIGNIFICANT CHANGE UP
PHOSPHATE SERPL-MCNC: 3.5 MG/DL — SIGNIFICANT CHANGE UP (ref 2.5–4.5)
PLATELET # BLD AUTO: 281 K/UL — SIGNIFICANT CHANGE UP (ref 150–400)
PMV BLD: 11 FL — SIGNIFICANT CHANGE UP (ref 7–13)
POTASSIUM SERPL-MCNC: 3.9 MMOL/L — SIGNIFICANT CHANGE UP (ref 3.5–5.3)
POTASSIUM SERPL-SCNC: 3.9 MMOL/L — SIGNIFICANT CHANGE UP (ref 3.5–5.3)
RBC # BLD: 3.72 M/UL — LOW (ref 3.8–5.2)
RBC # FLD: 14.6 % — HIGH (ref 10.3–14.5)
SODIUM SERPL-SCNC: 142 MMOL/L — SIGNIFICANT CHANGE UP (ref 135–145)
WBC # BLD: 9.38 K/UL — SIGNIFICANT CHANGE UP (ref 3.8–10.5)
WBC # FLD AUTO: 9.38 K/UL — SIGNIFICANT CHANGE UP (ref 3.8–10.5)

## 2017-08-26 RX ORDER — LEVOTHYROXINE SODIUM 125 MCG
200 TABLET ORAL DAILY
Qty: 0 | Refills: 0 | Status: DISCONTINUED | OUTPATIENT
Start: 2017-08-26 | End: 2017-08-27

## 2017-08-26 RX ORDER — VERAPAMIL HCL 240 MG
240 CAPSULE, EXTENDED RELEASE PELLETS 24 HR ORAL DAILY
Qty: 0 | Refills: 0 | Status: DISCONTINUED | OUTPATIENT
Start: 2017-08-27 | End: 2017-08-27

## 2017-08-26 RX ORDER — VALSARTAN 80 MG/1
320 TABLET ORAL DAILY
Qty: 0 | Refills: 0 | Status: DISCONTINUED | OUTPATIENT
Start: 2017-08-26 | End: 2017-08-27

## 2017-08-26 RX ORDER — HYDROCHLOROTHIAZIDE 25 MG
25 TABLET ORAL DAILY
Qty: 0 | Refills: 0 | Status: DISCONTINUED | OUTPATIENT
Start: 2017-08-26 | End: 2017-08-27

## 2017-08-26 RX ORDER — SODIUM CHLORIDE 9 MG/ML
3 INJECTION INTRAMUSCULAR; INTRAVENOUS; SUBCUTANEOUS EVERY 8 HOURS
Qty: 0 | Refills: 0 | Status: DISCONTINUED | OUTPATIENT
Start: 2017-08-26 | End: 2017-08-27

## 2017-08-26 RX ADMIN — Medication 1.25 MILLIGRAM(S): at 05:21

## 2017-08-26 RX ADMIN — HEPARIN SODIUM 5000 UNIT(S): 5000 INJECTION INTRAVENOUS; SUBCUTANEOUS at 05:21

## 2017-08-26 RX ADMIN — HEPARIN SODIUM 5000 UNIT(S): 5000 INJECTION INTRAVENOUS; SUBCUTANEOUS at 14:10

## 2017-08-26 RX ADMIN — Medication 1.25 MILLIGRAM(S): at 12:07

## 2017-08-26 RX ADMIN — Medication 100 MICROGRAM(S): at 05:18

## 2017-08-26 RX ADMIN — SODIUM CHLORIDE 3 MILLILITER(S): 9 INJECTION INTRAMUSCULAR; INTRAVENOUS; SUBCUTANEOUS at 21:51

## 2017-08-26 RX ADMIN — Medication 1.25 MILLIGRAM(S): at 00:14

## 2017-08-26 RX ADMIN — DEXTROSE MONOHYDRATE, SODIUM CHLORIDE, AND POTASSIUM CHLORIDE 75 MILLILITER(S): 50; .745; 4.5 INJECTION, SOLUTION INTRAVENOUS at 09:15

## 2017-08-26 RX ADMIN — VALSARTAN 320 MILLIGRAM(S): 80 TABLET ORAL at 14:45

## 2017-08-26 NOTE — DISCHARGE NOTE ADULT - PLAN OF CARE
Return to activities of daily living Activity: Resume activities of daily living.  Diet: Resume your regular diet  Follow up: Please make an appointment with Dr. Mcdonough in 1-2 weeks. Please find contact information on this page.

## 2017-08-26 NOTE — DISCHARGE NOTE ADULT - ADDITIONAL INSTRUCTIONS
Activity: Resume activities of daily living.  Diet: Resume your regular diet  Follow up: Please make an appointment with Dr. Mcdonough in 1-2 weeks. Please find contact information on this page.

## 2017-08-26 NOTE — DISCHARGE NOTE ADULT - CARE PLAN
Principal Discharge DX:	Small bowel obstruction  Goal:	Return to activities of daily living  Instructions for follow-up, activity and diet:	Activity: Resume activities of daily living.  Diet: Resume your regular diet  Follow up: Please make an appointment with Dr. Mcdonough in 1-2 weeks. Please find contact information on this page.

## 2017-08-26 NOTE — DISCHARGE NOTE ADULT - MEDICATION SUMMARY - MEDICATIONS TO TAKE
I will START or STAY ON the medications listed below when I get home from the hospital:    verapamil 240 mg/24 hours oral capsule, extended release  -- 1 cap(s) by mouth once a day  -- Indication: For hypertension    hydrochlorothiazide-valsartan 25 mg-320 mg oral tablet  -- 1 tab(s) by mouth once a day  -- Indication: For hypertension    levothyroxine 200 mcg (0.2 mg) oral tablet  -- 1 tab(s) by mouth once a day  -- Indication: For hypothyroidism

## 2017-08-26 NOTE — PROGRESS NOTE ADULT - ASSESSMENT
A: 62F with SBO, now with return of GI function. Pt is hemodynamically stable with improved exam.  Advised pt that diarrhea after resolution of SBO is normal, but will consider testing for c diff in the future if diarrhea continues.    P:  - Diet: CLD, consider advancing to LRD  - transition meds to PO  - F/u GI function  - OOB  - DVT ppx   - IS  - dispo: stable

## 2017-08-26 NOTE — DISCHARGE NOTE ADULT - PATIENT PORTAL LINK FT
“You can access the [employer] Patient Portal, offered by [facility], by registering with the following website: [email or web address]”

## 2017-08-26 NOTE — DISCHARGE NOTE ADULT - HOSPITAL COURSE
Patient was admitted on 08-22-17. Patient was medically optimized and improved clinically throughout hospital. Patient seen and examined on day of discharge.    Vital Signs Last 24 Hrs  T(C): 36.6 (26 Aug 2017 14:07), Max: 37.1 (26 Aug 2017 01:19)  T(F): 97.9 (26 Aug 2017 14:07), Max: 98.7 (26 Aug 2017 01:19)  HR: 64 (26 Aug 2017 14:07) (51 - 64)  BP: 153/88 (26 Aug 2017 14:07) (126/70 - 153/88)  BP(mean): --  RR: 18 (26 Aug 2017 14:07) (16 - 18)  SpO2: 100% (26 Aug 2017 14:07) (100% - 100%)    Physical Exam:  General: Well developed, well nourished, No Acute Distress  HEENT: Normocephallic Atraumatic, EOMI bl  Neurology: AA&Ox3  Abdominal: Soft, Non-Tender, Non-Distended  Extremities: No Clubbing, cyanosis or edema  Skin: warm, dry, normal color, no rash or abnormal lesions    Patient is medically stable and cleared for discharge to home with outpatient follow up. Patient was admitted on 08-22-17 for a small bowel obstruction. Patient was medically optimized and improved clinically throughout hospital. Patient seen and examined on day of discharge.    Vital Signs Last 24 Hrs  T(C): 36.8 (27 Aug 2017 05:45), Max: 36.8 (26 Aug 2017 17:45)  T(F): 98.2 (27 Aug 2017 05:45), Max: 98.3 (26 Aug 2017 17:45)  HR: 55 (27 Aug 2017 05:45) (55 - 64)  BP: 132/74 (27 Aug 2017 05:45) (132/74 - 153/88)  BP(mean): --  RR: 16 (27 Aug 2017 05:45) (16 - 18)  SpO2: 100% (27 Aug 2017 05:45) (100% - 100%)    Physical Exam:  General: Well developed, well nourished, No Acute Distress  HEENT: Normocephallic Atraumatic, EOMI bl  Neurology: AA&Ox3  Abdominal: Soft, Non-Tender, Non-Distended  Extremities: No Clubbing, cyanosis or edema  Skin: warm, dry, normal color, no rash or abnormal lesions    Patient is medically stable and cleared for discharge to home with outpatient follow up.

## 2017-08-26 NOTE — PROGRESS NOTE ADULT - ATTENDING COMMENTS
Above noted. Still has colicky pain but less frequent. Passed flatus recently. No bowel movement.  Plan: Continue NPO  Clamp NGT tube. Re-connect only if pain increases or she develops nausea.
Above noted. Feels well. Having bowel movements.  Plan: Advance diet. If tolerates- discharge today.

## 2017-08-26 NOTE — DISCHARGE NOTE ADULT - CARE PROVIDER_API CALL
Abel Mcdonough), Surgery  [address] [facility]  [address] [place] [id number]  Phone: [phone]  Fax: [phone]

## 2017-08-27 VITALS
SYSTOLIC BLOOD PRESSURE: 153 MMHG | TEMPERATURE: 98 F | RESPIRATION RATE: 18 BRPM | DIASTOLIC BLOOD PRESSURE: 87 MMHG | HEART RATE: 65 BPM | OXYGEN SATURATION: 100 %

## 2017-08-27 RX ADMIN — VALSARTAN 320 MILLIGRAM(S): 80 TABLET ORAL at 05:46

## 2017-08-27 RX ADMIN — Medication 200 MICROGRAM(S): at 05:47

## 2017-08-27 RX ADMIN — SODIUM CHLORIDE 3 MILLILITER(S): 9 INJECTION INTRAMUSCULAR; INTRAVENOUS; SUBCUTANEOUS at 05:26

## 2017-08-27 RX ADMIN — Medication 240 MILLIGRAM(S): at 05:46

## 2017-08-27 RX ADMIN — HEPARIN SODIUM 5000 UNIT(S): 5000 INJECTION INTRAVENOUS; SUBCUTANEOUS at 05:48

## 2017-08-27 NOTE — PROGRESS NOTE ADULT - SUBJECTIVE AND OBJECTIVE BOX
Patient seen and examined after being called by nurse for patient feeling "weak and faint." Patient alert and oriented x 4, AVSS, abdomen soft, NT, ND, and NGT to suction. Patient not tachycardic. 1L bolus ordered as patient was without IV  from early in the morning. Blood glucose 88 on fingerstick.     -1L bolus of LR  -follow up STAT labs  -replete electrolytes prn  -continue to monitor  -f/u CXR for NGT placement
GENERAL SURGERY DAILY PROGRESS NOTE:     Subjective:  YEFRI. NGT clamped overnight and was tolerated without N/V. Pain controlled. Pt reports passing gas and having BM.         Objective:    PE:  Gen: NAD  Resp: airway patent, respirations unlabored, no increased WoB  CVS: RRR  Abd: soft, ND, NT, no rebound or guarding  Ext: no edema, WWP  Neuro: AAOx3, no focal deficits    Vital Signs Last 24 Hrs  T(C): 36.9 (25 Aug 2017 10:24), Max: 37.1 (25 Aug 2017 05:27)  T(F): 98.4 (25 Aug 2017 10:24), Max: 98.7 (25 Aug 2017 05:27)  HR: 60 (25 Aug 2017 10:24) (52 - 62)  BP: 136/90 (25 Aug 2017 10:24) (115/56 - 152/94)  BP(mean): --  RR: 18 (25 Aug 2017 10:24) (16 - 18)  SpO2: 100% (25 Aug 2017 10:24) (98% - 100%)    I&O's Detail    24 Aug 2017 07:01  -  25 Aug 2017 07:00  --------------------------------------------------------  IN:    dextrose 5% + sodium chloride 0.45% with potassium chloride 20 mEq/L: 1250 mL  Total IN: 1250 mL    OUT:    Nasoenteral Tube: 300 mL    Voided: 1000 mL  Total OUT: 1300 mL    Total NET: -50 mL          Daily     Daily Weight in k (25 Aug 2017 01:39)    MEDICATIONS  (STANDING):  heparin  Injectable 5000 Unit(s) SubCutaneous every 8 hours  levothyroxine Injectable 100 MICROGram(s) IV Push daily  enalaprilat Injectable 1.25 milliGRAM(s) IV Push every 6 hours  dextrose 5% + sodium chloride 0.45% with potassium chloride 20 mEq/L 1000 milliLiter(s) (125 mL/Hr) IV Continuous <Continuous>  ondansetron Injectable 4 milliGRAM(s) IV Push once    MEDICATIONS  (PRN):      LABS:                        11.2   10.29 )-----------( 286      ( 25 Aug 2017 08:40 )             34.4         142  |  106  |  3<L>  ----------------------------<  90  3.8   |  23  |  1.10    Ca    9.2      25 Aug 2017 08:40  Phos  2.9       Mg     2.0                 RADIOLOGY & ADDITIONAL STUDIES:
S: 61yo Woman seen and examined during morning rounds. No acute events overnight; afebrile, VS stable. Feels better today but had pain and nausea overnight. Denies N/V. Denies passing flatus or having bowel movements since yesterday.     O:  Vital Signs Last 24 Hrs  T(C): 37.2 (22 Aug 2017 21:41), Max: 37.2 (22 Aug 2017 21:41)  T(F): 98.9 (22 Aug 2017 21:41), Max: 98.9 (22 Aug 2017 21:41)  HR: 66 (22 Aug 2017 22:46) (54 - 79)  BP: 149/83 (22 Aug 2017 22:46) (131/75 - 185/91)  BP(mean): --  RR: 19 (22 Aug 2017 21:41) (13 - 25)  SpO2: 100% (22 Aug 2017 21:41) (95% - 100%)    I&O's Detail    21 Aug 2017 07:01  -  22 Aug 2017 07:00  --------------------------------------------------------  IN:  Total IN: 0 mL    OUT:    Nasoenteral Tube: 200 mL  Total OUT: 200 mL    Total NET: -200 mL      22 Aug 2017 07:01  -  22 Aug 2017 22:59  --------------------------------------------------------  IN:    dextrose 5% + sodium chloride 0.45% with potassium chloride 20 mEq/L: 875 mL    IV PiggyBack: 300 mL    Lactated Ringers IV Bolus: 1000 mL    lactated ringers.: 225 mL  Total IN: 2400 mL    OUT:    Nasoenteral Tube: 600 mL    Voided: 750 mL  Total OUT: 1350 mL    Total NET: 1050 mL      Physical Exam:  Gen: Resting in bed, NAD, alert and oriented.   Resp: airway patent, respirations unlabored, no increased WOB   Abd: soft, NT/ND, NGT to suction was advanced    MEDICATIONS  (STANDING):  heparin  Injectable 5000 Unit(s) SubCutaneous every 8 hours  levothyroxine Injectable 100 MICROGram(s) IV Push daily  enalaprilat Injectable 1.25 milliGRAM(s) IV Push every 6 hours  dextrose 5% + sodium chloride 0.45% with potassium chloride 20 mEq/L 1000 milliLiter(s) (125 mL/Hr) IV Continuous <Continuous>  ondansetron Injectable 4 milliGRAM(s) IV Push once    MEDICATIONS  (PRN):      LABS:                        11.4   14.38 )-----------( 291      ( 22 Aug 2017 12:18 )             35.7       08-22    144  |  103  |  12  ----------------------------<  98  3.5   |  28  |  1.23    Ca    9.0      22 Aug 2017 12:18  Phos  3.6     08-22  Mg     2.1     08-22    TPro  8.6<H>  /  Alb  4.0  /  TBili  0.4  /  DBili  x   /  AST  27  /  ALT  13  /  AlkPhos  59  08-21
S: 61yo Woman seen and examined during morning rounds. No acute events overnight; afebrile, VS stable. Pain well controlled with current regimen. Tolerating diet; denies N/V. Endorses passing flatus and having bowel movements which she described as diarrhea.     O:  Vital Signs Last 24 Hrs  T(C): 36.6 (26 Aug 2017 09:13), Max: 37.1 (26 Aug 2017 01:19)  T(F): 97.8 (26 Aug 2017 09:13), Max: 98.7 (26 Aug 2017 01:19)  HR: 58 (26 Aug 2017 12:03) (51 - 63)  BP: 153/80 (26 Aug 2017 12:03) (126/70 - 153/80)  BP(mean): --  RR: 18 (26 Aug 2017 09:13) (16 - 18)  SpO2: 100% (26 Aug 2017 09:13) (100% - 100%)    I&O's Detail    25 Aug 2017 07:01  -  26 Aug 2017 07:00  --------------------------------------------------------  IN:    dextrose 5% + sodium chloride 0.45% with potassium chloride 20 mEq/L: 950 mL  Total IN: 950 mL    OUT:    Voided: 800 mL  Total OUT: 800 mL    Total NET: 150 mL      Physical Exam:  Gen: Resting in bed, NAD, alert and oriented.   Resp: airway patent, respirations unlabored, no increased WOB   Abd: soft, NT/ND    MEDICATIONS  (STANDING):  heparin  Injectable 5000 Unit(s) SubCutaneous every 8 hours  levothyroxine Injectable 100 MICROGram(s) IV Push daily  enalaprilat Injectable 1.25 milliGRAM(s) IV Push every 6 hours  dextrose 5% + sodium chloride 0.45% with potassium chloride 20 mEq/L 1000 milliLiter(s) (75 mL/Hr) IV Continuous <Continuous>  ondansetron Injectable 4 milliGRAM(s) IV Push once    MEDICATIONS  (PRN):      LABS:                        11.0   9.38  )-----------( 281      ( 26 Aug 2017 05:30 )             34.4       08-26    142  |  105  |  4<L>  ----------------------------<  92  3.9   |  24  |  1.22    Ca    9.1      26 Aug 2017 05:30  Phos  3.5     08-26  Mg     1.8     08-26
S: 63yo Woman seen and examined during morning rounds. No acute events overnight. Pain intermittently controlled with current regimen.    O:    Physical Exam:  Gen: Resting in bed, NAD, alert and oriented.   Resp: airway patent, respirations unlabored, no increased WOB   Abd: soft, nondistended, epigastric tenderness    LABS:                        11.3   9.25  )-----------( 280      ( 23 Aug 2017 07:37 )             35.1       08-23    142  |  103  |  8   ----------------------------<  122<H>  3.8   |  30  |  1.19    Ca    9.1      23 Aug 2017 07:37  Phos  2.4     08-23  Mg     1.9     08-23
S: 63yo Woman seen and examined during morning rounds. No acute events overnight; afebrile, VS stable. Pain well controlled with current regimen; c/o some "gas pain". Tolerating diet; denies N/V. Endorses passing flatus and having bowel movements.     O:  Vital Signs Last 24 Hrs  T(C): 36.8 (27 Aug 2017 05:45), Max: 36.8 (26 Aug 2017 17:45)  T(F): 98.2 (27 Aug 2017 05:45), Max: 98.3 (26 Aug 2017 17:45)  HR: 55 (27 Aug 2017 05:45) (55 - 64)  BP: 132/74 (27 Aug 2017 05:45) (132/74 - 153/88)  BP(mean): --  RR: 16 (27 Aug 2017 05:45) (16 - 18)  SpO2: 100% (27 Aug 2017 05:45) (100% - 100%)    I&O's Detail    26 Aug 2017 07:01  -  27 Aug 2017 07:00  --------------------------------------------------------  IN:    dextrose 5% + sodium chloride 0.45% with potassium chloride 20 mEq/L: 750 mL  Total IN: 750 mL    OUT:    Voided: 400 mL  Total OUT: 400 mL    Total NET: 350 mL    Physical Exam:  Gen: Resting in bed, NAD, alert and oriented.   Resp: airway patent, respirations unlabored, no increased WOB   Abd: soft, NT/ND    MEDICATIONS  (STANDING):  heparin  Injectable 5000 Unit(s) SubCutaneous every 8 hours  ondansetron Injectable 4 milliGRAM(s) IV Push once  valsartan 320 milliGRAM(s) Oral daily  hydrochlorothiazide 25 milliGRAM(s) Oral daily  verapamil  milliGRAM(s) Oral daily  levothyroxine 200 MICROGram(s) Oral daily  sodium chloride 0.9% lock flush 3 milliLiter(s) IV Push every 8 hours    MEDICATIONS  (PRN):      LABS:                        11.0   9.38  )-----------( 281      ( 26 Aug 2017 05:30 )             34.4       08-26    142  |  105  |  4<L>  ----------------------------<  92  3.9   |  24  |  1.22    Ca    9.1      26 Aug 2017 05:30  Phos  3.5     08-26  Mg     1.8     08-26
SUBJECTIVE      Pt doing well. Pt denies passing flatus or having BM. Pt has been OOB. Pt denies N/V.     10-point review of systems completed and negative except as noted above.    heparin  Injectable 5000 Unit(s) SubCutaneous every 8 hours  levothyroxine Injectable 100 MICROGram(s) IV Push daily  enalaprilat Injectable 1.25 milliGRAM(s) IV Push every 6 hours  dextrose 5% + sodium chloride 0.45% with potassium chloride 20 mEq/L 1000 milliLiter(s) IV Continuous <Continuous>  ondansetron Injectable 4 milliGRAM(s) IV Push once  potassium phosphate IVPB 15 milliMole(s) IV Intermittent once  potassium chloride  10 mEq/100 mL IVPB 10 milliEquivalent(s) IV Intermittent every 1 hour            OBJECTIVE    T(C): 36.3 (08-24-17 @ 10:37), Max: 37.4 (08-23-17 @ 17:07)  HR: 57 (08-24-17 @ 10:37) (57 - 64)  BP: 162/93 (08-24-17 @ 10:37) (117/71 - 162/93)  RR: 18 (08-24-17 @ 10:37) (16 - 20)  SpO2: 100% (08-24-17 @ 10:37) (96% - 100%)    08-23-17 @ 07:01  -  08-24-17 @ 07:00  --------------------------------------------------------  IN: 3350 mL / OUT: 1125 mL / NET: 2225 mL        EXAM  General: awake, alert, NAD, NGT in place  Pulm: CTAB, respirations unlabored, no increased WOB  CV: Regular rate and rhythm  Abdomen: soft, NT, decreased distention  Extremities: WWPx4, Grossly symmetric    LABS                        10.9   8.89  )-----------( 276      ( 24 Aug 2017 06:30 )             33.0     08-24    141  |  102  |  5<L>  ----------------------------<  114<H>  3.5   |  27  |  1.16    Ca    8.8      24 Aug 2017 06:30  Phos  2.5     08-24  Mg     1.7     08-24            RADIOLOGY & ADDITIONAL STUDIES          ASSESSMENT AND PLAN  62F with SBO, pt awaiting return of GI function. Pt is hemodynamically stable with improving exam.     - Diet: NPO, continue NGT  - F/u NGT output  - F/u GI function  - OOB  - DVT ppx   - IS  - dispo: stable

## 2017-08-27 NOTE — PROGRESS NOTE ADULT - ASSESSMENT
A: 62F with SBO, now with return of GI function. Pt is hemodynamically stable with improved exam.    P:  - Diet: LRD  - OOB  - DVT ppx   - IS  - dispo: stable and ready for discharge

## 2017-09-01 ENCOUNTER — RX RENEWAL (OUTPATIENT)
Age: 62
End: 2017-09-01

## 2017-09-08 ENCOUNTER — APPOINTMENT (OUTPATIENT)
Dept: ORTHOPEDIC SURGERY | Facility: CLINIC | Age: 62
End: 2017-09-08
Payer: COMMERCIAL

## 2017-09-08 VITALS
HEIGHT: 67 IN | BODY MASS INDEX: 41.44 KG/M2 | SYSTOLIC BLOOD PRESSURE: 151 MMHG | HEART RATE: 80 BPM | WEIGHT: 264 LBS | DIASTOLIC BLOOD PRESSURE: 88 MMHG

## 2017-09-08 DIAGNOSIS — M47.816 SPONDYLOSIS W/OUT MYELOPATHY OR RADICULOPATHY, LUMBAR REGION: ICD-10-CM

## 2017-09-08 PROCEDURE — 73562 X-RAY EXAM OF KNEE 3: CPT | Mod: 50

## 2017-09-08 PROCEDURE — 72100 X-RAY EXAM L-S SPINE 2/3 VWS: CPT

## 2017-09-08 PROCEDURE — 99214 OFFICE O/P EST MOD 30 MIN: CPT

## 2017-09-14 ENCOUNTER — APPOINTMENT (OUTPATIENT)
Dept: ORTHOPEDIC SURGERY | Facility: CLINIC | Age: 62
End: 2017-09-14
Payer: COMMERCIAL

## 2017-09-14 VITALS
WEIGHT: 259 LBS | SYSTOLIC BLOOD PRESSURE: 167 MMHG | HEIGHT: 67 IN | BODY MASS INDEX: 40.65 KG/M2 | HEART RATE: 55 BPM | DIASTOLIC BLOOD PRESSURE: 94 MMHG

## 2017-09-14 DIAGNOSIS — Z85.528 PERSONAL HISTORY OF OTHER MALIGNANT NEOPLASM OF KIDNEY: ICD-10-CM

## 2017-09-14 DIAGNOSIS — Z78.9 OTHER SPECIFIED HEALTH STATUS: ICD-10-CM

## 2017-09-14 DIAGNOSIS — Z87.09 PERSONAL HISTORY OF OTHER DISEASES OF THE RESPIRATORY SYSTEM: ICD-10-CM

## 2017-09-14 PROCEDURE — 99215 OFFICE O/P EST HI 40 MIN: CPT

## 2017-09-14 PROCEDURE — 72100 X-RAY EXAM L-S SPINE 2/3 VWS: CPT

## 2017-10-31 ENCOUNTER — RESULT CHARGE (OUTPATIENT)
Age: 62
End: 2017-10-31

## 2017-11-01 ENCOUNTER — APPOINTMENT (OUTPATIENT)
Dept: INTERNAL MEDICINE | Facility: CLINIC | Age: 62
End: 2017-11-01

## 2017-11-16 ENCOUNTER — INPATIENT (INPATIENT)
Facility: HOSPITAL | Age: 62
LOS: 2 days | Discharge: HOME | End: 2017-11-19
Attending: SURGERY | Admitting: SURGERY
Payer: COMMERCIAL

## 2017-11-16 VITALS
DIASTOLIC BLOOD PRESSURE: 92 MMHG | TEMPERATURE: 98 F | RESPIRATION RATE: 16 BRPM | HEART RATE: 71 BPM | SYSTOLIC BLOOD PRESSURE: 162 MMHG | OXYGEN SATURATION: 100 %

## 2017-11-16 DIAGNOSIS — K56.609 UNSPECIFIED INTESTINAL OBSTRUCTION, UNSPECIFIED AS TO PARTIAL VERSUS COMPLETE OBSTRUCTION: ICD-10-CM

## 2017-11-16 LAB
ALBUMIN SERPL ELPH-MCNC: 4.1 G/DL — SIGNIFICANT CHANGE UP (ref 3.3–5)
ALP SERPL-CCNC: 60 U/L — SIGNIFICANT CHANGE UP (ref 40–120)
ALT FLD-CCNC: 28 U/L — SIGNIFICANT CHANGE UP (ref 4–33)
APTT BLD: 30.3 SEC — SIGNIFICANT CHANGE UP (ref 27.5–37.4)
AST SERPL-CCNC: 22 U/L — SIGNIFICANT CHANGE UP (ref 4–32)
BASE EXCESS BLDV CALC-SCNC: 4.4 MMOL/L — SIGNIFICANT CHANGE UP
BASOPHILS # BLD AUTO: 0.02 K/UL — SIGNIFICANT CHANGE UP (ref 0–0.2)
BASOPHILS NFR BLD AUTO: 0.2 % — SIGNIFICANT CHANGE UP (ref 0–2)
BILIRUB SERPL-MCNC: 0.3 MG/DL — SIGNIFICANT CHANGE UP (ref 0.2–1.2)
BLOOD GAS VENOUS - CREATININE: 1.23 MG/DL — SIGNIFICANT CHANGE UP (ref 0.5–1.3)
BUN SERPL-MCNC: 13 MG/DL — SIGNIFICANT CHANGE UP (ref 7–23)
CALCIUM SERPL-MCNC: 9.5 MG/DL — SIGNIFICANT CHANGE UP (ref 8.4–10.5)
CHLORIDE BLDV-SCNC: 105 MMOL/L — SIGNIFICANT CHANGE UP (ref 96–108)
CHLORIDE SERPL-SCNC: 101 MMOL/L — SIGNIFICANT CHANGE UP (ref 98–107)
CO2 SERPL-SCNC: 24 MMOL/L — SIGNIFICANT CHANGE UP (ref 22–31)
CREAT SERPL-MCNC: 1.27 MG/DL — SIGNIFICANT CHANGE UP (ref 0.5–1.3)
EOSINOPHIL # BLD AUTO: 0.02 K/UL — SIGNIFICANT CHANGE UP (ref 0–0.5)
EOSINOPHIL NFR BLD AUTO: 0.2 % — SIGNIFICANT CHANGE UP (ref 0–6)
GAS PNL BLDV: 136 MMOL/L — SIGNIFICANT CHANGE UP (ref 136–146)
GLUCOSE BLDV-MCNC: 100 — HIGH (ref 70–99)
GLUCOSE SERPL-MCNC: 101 MG/DL — HIGH (ref 70–99)
HCO3 BLDV-SCNC: 27 MMOL/L — SIGNIFICANT CHANGE UP (ref 20–27)
HCT VFR BLD CALC: 41.5 % — SIGNIFICANT CHANGE UP (ref 34.5–45)
HCT VFR BLDV CALC: 42.2 % — SIGNIFICANT CHANGE UP (ref 34.5–45)
HGB BLD-MCNC: 13.1 G/DL — SIGNIFICANT CHANGE UP (ref 11.5–15.5)
HGB BLDV-MCNC: 13.8 G/DL — SIGNIFICANT CHANGE UP (ref 11.5–15.5)
IMM GRANULOCYTES # BLD AUTO: 0.03 # — SIGNIFICANT CHANGE UP
IMM GRANULOCYTES NFR BLD AUTO: 0.3 % — SIGNIFICANT CHANGE UP (ref 0–1.5)
INR BLD: 0.98 — SIGNIFICANT CHANGE UP (ref 0.88–1.17)
LACTATE BLDV-MCNC: 1.5 MMOL/L — SIGNIFICANT CHANGE UP (ref 0.5–2)
LIDOCAIN IGE QN: 13.1 U/L — SIGNIFICANT CHANGE UP (ref 7–60)
LYMPHOCYTES # BLD AUTO: 1.06 K/UL — SIGNIFICANT CHANGE UP (ref 1–3.3)
LYMPHOCYTES # BLD AUTO: 10 % — LOW (ref 13–44)
MCHC RBC-ENTMCNC: 29.5 PG — SIGNIFICANT CHANGE UP (ref 27–34)
MCHC RBC-ENTMCNC: 31.6 % — LOW (ref 32–36)
MCV RBC AUTO: 93.5 FL — SIGNIFICANT CHANGE UP (ref 80–100)
MONOCYTES # BLD AUTO: 0.51 K/UL — SIGNIFICANT CHANGE UP (ref 0–0.9)
MONOCYTES NFR BLD AUTO: 4.8 % — SIGNIFICANT CHANGE UP (ref 2–14)
NEUTROPHILS # BLD AUTO: 8.92 K/UL — HIGH (ref 1.8–7.4)
NEUTROPHILS NFR BLD AUTO: 84.5 % — HIGH (ref 43–77)
NRBC # FLD: 0 — SIGNIFICANT CHANGE UP
PCO2 BLDV: 45 MMHG — SIGNIFICANT CHANGE UP (ref 41–51)
PH BLDV: 7.42 PH — SIGNIFICANT CHANGE UP (ref 7.32–7.43)
PLATELET # BLD AUTO: 292 K/UL — SIGNIFICANT CHANGE UP (ref 150–400)
PMV BLD: 10.5 FL — SIGNIFICANT CHANGE UP (ref 7–13)
PO2 BLDV: 32 MMHG — LOW (ref 35–40)
POTASSIUM BLDV-SCNC: 3.3 MMOL/L — LOW (ref 3.4–4.5)
POTASSIUM SERPL-MCNC: 3.7 MMOL/L — SIGNIFICANT CHANGE UP (ref 3.5–5.3)
POTASSIUM SERPL-SCNC: 3.7 MMOL/L — SIGNIFICANT CHANGE UP (ref 3.5–5.3)
PROT SERPL-MCNC: 8.5 G/DL — HIGH (ref 6–8.3)
PROTHROM AB SERPL-ACNC: 11 SEC — SIGNIFICANT CHANGE UP (ref 9.8–13.1)
RBC # BLD: 4.44 M/UL — SIGNIFICANT CHANGE UP (ref 3.8–5.2)
RBC # FLD: 14.6 % — HIGH (ref 10.3–14.5)
SAO2 % BLDV: 54.6 % — LOW (ref 60–85)
SODIUM SERPL-SCNC: 139 MMOL/L — SIGNIFICANT CHANGE UP (ref 135–145)
WBC # BLD: 10.56 K/UL — HIGH (ref 3.8–10.5)
WBC # FLD AUTO: 10.56 K/UL — HIGH (ref 3.8–10.5)

## 2017-11-16 PROCEDURE — 99222 1ST HOSP IP/OBS MODERATE 55: CPT | Mod: GC

## 2017-11-16 PROCEDURE — 74177 CT ABD & PELVIS W/CONTRAST: CPT | Mod: 26

## 2017-11-16 PROCEDURE — 71010: CPT | Mod: 26

## 2017-11-16 RX ORDER — ONDANSETRON 8 MG/1
4 TABLET, FILM COATED ORAL ONCE
Qty: 0 | Refills: 0 | Status: COMPLETED | OUTPATIENT
Start: 2017-11-16 | End: 2017-11-16

## 2017-11-16 RX ORDER — MORPHINE SULFATE 50 MG/1
4 CAPSULE, EXTENDED RELEASE ORAL ONCE
Qty: 0 | Refills: 0 | Status: DISCONTINUED | OUTPATIENT
Start: 2017-11-16 | End: 2017-11-16

## 2017-11-16 RX ORDER — SODIUM CHLORIDE 9 MG/ML
2000 INJECTION INTRAMUSCULAR; INTRAVENOUS; SUBCUTANEOUS ONCE
Qty: 0 | Refills: 0 | Status: COMPLETED | OUTPATIENT
Start: 2017-11-16 | End: 2017-11-16

## 2017-11-16 RX ORDER — SODIUM CHLORIDE 9 MG/ML
1000 INJECTION INTRAMUSCULAR; INTRAVENOUS; SUBCUTANEOUS ONCE
Qty: 0 | Refills: 0 | Status: COMPLETED | OUTPATIENT
Start: 2017-11-16 | End: 2017-11-16

## 2017-11-16 RX ADMIN — SODIUM CHLORIDE 666.67 MILLILITER(S): 9 INJECTION INTRAMUSCULAR; INTRAVENOUS; SUBCUTANEOUS at 20:41

## 2017-11-16 RX ADMIN — MORPHINE SULFATE 4 MILLIGRAM(S): 50 CAPSULE, EXTENDED RELEASE ORAL at 19:29

## 2017-11-16 RX ADMIN — MORPHINE SULFATE 4 MILLIGRAM(S): 50 CAPSULE, EXTENDED RELEASE ORAL at 23:04

## 2017-11-16 RX ADMIN — SODIUM CHLORIDE 1000 MILLILITER(S): 9 INJECTION INTRAMUSCULAR; INTRAVENOUS; SUBCUTANEOUS at 19:30

## 2017-11-16 RX ADMIN — ONDANSETRON 4 MILLIGRAM(S): 8 TABLET, FILM COATED ORAL at 19:29

## 2017-11-16 RX ADMIN — MORPHINE SULFATE 4 MILLIGRAM(S): 50 CAPSULE, EXTENDED RELEASE ORAL at 22:49

## 2017-11-16 RX ADMIN — ONDANSETRON 4 MILLIGRAM(S): 8 TABLET, FILM COATED ORAL at 22:49

## 2017-11-16 RX ADMIN — MORPHINE SULFATE 4 MILLIGRAM(S): 50 CAPSULE, EXTENDED RELEASE ORAL at 20:06

## 2017-11-16 NOTE — H&P ADULT - HISTORY OF PRESENT ILLNESS
63yo F with history of multiple previous SBOs managed conservatively presents with crampy abdominal pain since 10a. Pt reports passing gas and having BMs at 10a today and then having severe sharp gas-like pain. Pt complains of nausea and vomiting, no fevers, chills or diarrhea. Pt reports this feels similar to her previous SBOs the most recent one was in August. Usually they resolve in 2-3 days with NGT decompression. 61yo F with history of multiple previous SBOs managed conservatively presents with crampy abdominal pain since 10a. Pt reports passing gas and having BMs at 10a today and then having severe sharp gas-like pain. No nausea, vomiting, fevers, chills or diarrhea. Pt reports this feels similar to her previous SBOs the most recent one was in August. Usually they resolve in 2-3 days with NGT decompression.

## 2017-11-16 NOTE — H&P ADULT - ASSESSMENT
61yo F with recurrent SBO    - NPO, NGT decompression  - IV hydration  - serial abdominal exams  - await return of bowel function  - admit to surgery  - home meds    discussed with Dr. Waddell who is covering for Dr. Ceasar BAIRD Team surgery   [id number]

## 2017-11-16 NOTE — H&P ADULT - NSHPPHYSICALEXAM_GEN_ALL_CORE
Vital Signs Last 24 Hrs  T(C): 36.8 (16 Nov 2017 23:30), Max: 37 (16 Nov 2017 22:45)  T(F): 98.2 (16 Nov 2017 23:30), Max: 98.6 (16 Nov 2017 22:45)  HR: 70 (16 Nov 2017 23:30) (65 - 73)  BP: 146/80 (16 Nov 2017 23:30) (142/75 - 171/84)  BP(mean): --  RR: 17 (16 Nov 2017 23:30) (15 - 18)  SpO2: 99% (16 Nov 2017 23:30) (97% - 100%)      General Appearance: awake, alert, NAD  Neck: supple  Chest: equal chest rise  CV: RRR  Abdomen: soft, NT, ND, no rebound, no guarding   Extremities: KELLOGG

## 2017-11-16 NOTE — ED ADULT TRIAGE NOTE - CHIEF COMPLAINT QUOTE
c/o pain around umbilicus since yesterday with worsening today, Denies any N/V/D, Last BM this A.M., Uncomfortable in traige. PMH: SBO x 9 episodes in the past, OA, partial nephrectomy, Hypothyroidism, HTN,

## 2017-11-16 NOTE — ED PROVIDER NOTE - OBJECTIVE STATEMENT
61yo F with recurrent SBOs , now presents with abdominal pain, nausea x 1 day, last BM today at 10:30. Pain is diffuse, feels like previous SBO, gradual onset. No vomiting. No fevers or chills.

## 2017-11-16 NOTE — H&P ADULT - NSHPLABSRESULTS_GEN_ALL_CORE
LABS:                        13.1   10.56 )-----------( 292      ( 16 Nov 2017 19:21 )             41.5     11-16    139  |  101  |  13  ----------------------------<  101<H>  3.7   |  24  |  1.27    Ca    9.5      16 Nov 2017 19:21    TPro  8.5<H>  /  Alb  4.1  /  TBili  0.3  /  DBili  x   /  AST  22  /  ALT  28  /  AlkPhos  60  11-16    PT/INR - ( 16 Nov 2017 19:21 )   PT: 11.0 SEC;   INR: 0.98          PTT - ( 16 Nov 2017 19:21 )  PTT:30.3 SEC      RADIOLOGY & ADDITIONAL STUDIES:  < from: CT Abdomen and Pelvis w/ Oral Cont and w/ IV Cont (11.16.17 @ 21:22) >    FINDINGS:    LOWER CHEST: Small hiatal hernia.    LIVER: An approximately 1.5 cm vague hypodensity in segment 7 (2:21) has   indeterminate CT density but is grossly stable dating back to 06/11/2008,   compatible with benign etiology; this most likely represents a   hemangioma.  Stable cyst in segment 6, measuring approximately 2.9 x 1.6   cm.  BILE DUCTS: Normal caliber.  GALLBLADDER: Within normal limits.  SPLEEN: Within normal limits.  PANCREAS: Within normal limits.  ADRENALS: A 1.5 cm right adrenal nodule has indeterminate CT density but   is grossly stable since 06/11/2008, compatible with benign etiology; this   most likely represents an adenoma.  KIDNEYS/URETERS: Status post partial left nephrectomy.  Kidneys enhance  symmetrically without hydronephrosis or renal stones.  Bilateral   subcentimeter hypodense lesions are too small to characterize.    BLADDER: Within normal limits.  REPRODUCTIVE ORGANS: Status post hysterectomy.    BOWEL: There are dilated fluid-filled and fecalized loops of mid ileum   with a single transition point in the right hemipelvis (2:72), that is   virtually identical in configuration to the prior bowel obstruction seen   on 08/22/2017.  Normal appendix.  No colonic diverticular disease.  PERITONEUM: Trace free pelvic fluid.  VESSELS:  Within normal limits.  RETROPERITONEUM: No lymphadenopathy.    ABDOMINAL WALL: Within normal limits.  BONES: Rating of the lumbar lordosis and multilevel degenerative changes   in the spine with moderate spinal canal stenosis at L3-L4 mild to   moderate spinal canal stenosis at L4-L5.  Moderate arthritic changes in   the right hip joint.    IMPRESSION: Recurrent small bowel obstruction with single transition   point in the right hemipelvis, thatis virtually identical configuration   to the prior bowel obstruction seen on 08/22/2017.  No CT evidence of   gastrointestinal perforation, abscess/drainable fluid collection or   secondary CT signs of bowel ischemia.  The patient would likely benefit   from nasogastric tube placement.    < end of copied text >

## 2017-11-17 LAB
BUN SERPL-MCNC: 10 MG/DL — SIGNIFICANT CHANGE UP (ref 7–23)
CALCIUM SERPL-MCNC: 8.3 MG/DL — LOW (ref 8.4–10.5)
CHLORIDE SERPL-SCNC: 105 MMOL/L — SIGNIFICANT CHANGE UP (ref 98–107)
CO2 SERPL-SCNC: 24 MMOL/L — SIGNIFICANT CHANGE UP (ref 22–31)
CREAT SERPL-MCNC: 1.09 MG/DL — SIGNIFICANT CHANGE UP (ref 0.5–1.3)
GLUCOSE SERPL-MCNC: 86 MG/DL — SIGNIFICANT CHANGE UP (ref 70–99)
HCT VFR BLD CALC: 34.5 % — SIGNIFICANT CHANGE UP (ref 34.5–45)
HGB BLD-MCNC: 11.1 G/DL — LOW (ref 11.5–15.5)
MAGNESIUM SERPL-MCNC: 2 MG/DL — SIGNIFICANT CHANGE UP (ref 1.6–2.6)
MCHC RBC-ENTMCNC: 30.2 PG — SIGNIFICANT CHANGE UP (ref 27–34)
MCHC RBC-ENTMCNC: 32.2 % — SIGNIFICANT CHANGE UP (ref 32–36)
MCV RBC AUTO: 94 FL — SIGNIFICANT CHANGE UP (ref 80–100)
NRBC # FLD: 0 — SIGNIFICANT CHANGE UP
PLATELET # BLD AUTO: 252 K/UL — SIGNIFICANT CHANGE UP (ref 150–400)
PMV BLD: 10.9 FL — SIGNIFICANT CHANGE UP (ref 7–13)
POTASSIUM SERPL-MCNC: 3.4 MMOL/L — LOW (ref 3.5–5.3)
POTASSIUM SERPL-SCNC: 3.4 MMOL/L — LOW (ref 3.5–5.3)
RBC # BLD: 3.67 M/UL — LOW (ref 3.8–5.2)
RBC # FLD: 14.6 % — HIGH (ref 10.3–14.5)
SODIUM SERPL-SCNC: 140 MMOL/L — SIGNIFICANT CHANGE UP (ref 135–145)
WBC # BLD: 8.68 K/UL — SIGNIFICANT CHANGE UP (ref 3.8–10.5)
WBC # FLD AUTO: 8.68 K/UL — SIGNIFICANT CHANGE UP (ref 3.8–10.5)

## 2017-11-17 RX ORDER — ACETAMINOPHEN 500 MG
1000 TABLET ORAL ONCE
Qty: 0 | Refills: 0 | Status: COMPLETED | OUTPATIENT
Start: 2017-11-17 | End: 2017-11-17

## 2017-11-17 RX ORDER — VERAPAMIL HCL 240 MG
240 CAPSULE, EXTENDED RELEASE PELLETS 24 HR ORAL DAILY
Qty: 0 | Refills: 0 | Status: DISCONTINUED | OUTPATIENT
Start: 2017-11-17 | End: 2017-11-19

## 2017-11-17 RX ORDER — ENOXAPARIN SODIUM 100 MG/ML
40 INJECTION SUBCUTANEOUS EVERY 24 HOURS
Qty: 0 | Refills: 0 | Status: DISCONTINUED | OUTPATIENT
Start: 2017-11-17 | End: 2017-11-19

## 2017-11-17 RX ORDER — VERAPAMIL HCL 240 MG
240 CAPSULE, EXTENDED RELEASE PELLETS 24 HR ORAL DAILY
Qty: 0 | Refills: 0 | Status: DISCONTINUED | OUTPATIENT
Start: 2017-11-17 | End: 2017-11-17

## 2017-11-17 RX ORDER — LEVOTHYROXINE SODIUM 125 MCG
100 TABLET ORAL DAILY
Qty: 0 | Refills: 0 | Status: DISCONTINUED | OUTPATIENT
Start: 2017-11-17 | End: 2017-11-18

## 2017-11-17 RX ORDER — POTASSIUM CHLORIDE 20 MEQ
10 PACKET (EA) ORAL
Qty: 0 | Refills: 0 | Status: COMPLETED | OUTPATIENT
Start: 2017-11-17 | End: 2017-11-17

## 2017-11-17 RX ORDER — DEXTROSE MONOHYDRATE, SODIUM CHLORIDE, AND POTASSIUM CHLORIDE 50; .745; 4.5 G/1000ML; G/1000ML; G/1000ML
1000 INJECTION, SOLUTION INTRAVENOUS
Qty: 0 | Refills: 0 | Status: DISCONTINUED | OUTPATIENT
Start: 2017-11-17 | End: 2017-11-18

## 2017-11-17 RX ORDER — BENZOCAINE AND MENTHOL 5; 1 G/100ML; G/100ML
1 LIQUID ORAL
Qty: 0 | Refills: 0 | Status: DISCONTINUED | OUTPATIENT
Start: 2017-11-17 | End: 2017-11-19

## 2017-11-17 RX ORDER — INFLUENZA VIRUS VACCINE 15; 15; 15; 15 UG/.5ML; UG/.5ML; UG/.5ML; UG/.5ML
0.5 SUSPENSION INTRAMUSCULAR ONCE
Qty: 0 | Refills: 0 | Status: COMPLETED | OUTPATIENT
Start: 2017-11-17 | End: 2017-11-17

## 2017-11-17 RX ORDER — SODIUM CHLORIDE 9 MG/ML
1000 INJECTION, SOLUTION INTRAVENOUS
Qty: 0 | Refills: 0 | Status: DISCONTINUED | OUTPATIENT
Start: 2017-11-17 | End: 2017-11-17

## 2017-11-17 RX ADMIN — SODIUM CHLORIDE 125 MILLILITER(S): 9 INJECTION, SOLUTION INTRAVENOUS at 10:49

## 2017-11-17 RX ADMIN — Medication 100 MILLIEQUIVALENT(S): at 16:05

## 2017-11-17 RX ADMIN — Medication 400 MILLIGRAM(S): at 14:01

## 2017-11-17 RX ADMIN — Medication 100 MICROGRAM(S): at 06:40

## 2017-11-17 RX ADMIN — SODIUM CHLORIDE 125 MILLILITER(S): 9 INJECTION, SOLUTION INTRAVENOUS at 00:48

## 2017-11-17 RX ADMIN — Medication 100 MILLIEQUIVALENT(S): at 11:48

## 2017-11-17 RX ADMIN — Medication 100 MILLIEQUIVALENT(S): at 14:02

## 2017-11-17 RX ADMIN — Medication 400 MILLIGRAM(S): at 22:25

## 2017-11-17 RX ADMIN — BENZOCAINE AND MENTHOL 1 LOZENGE: 5; 1 LIQUID ORAL at 12:58

## 2017-11-17 RX ADMIN — Medication 240 MILLIGRAM(S): at 12:48

## 2017-11-17 RX ADMIN — ENOXAPARIN SODIUM 40 MILLIGRAM(S): 100 INJECTION SUBCUTANEOUS at 06:40

## 2017-11-17 RX ADMIN — Medication 1000 MILLIGRAM(S): at 01:28

## 2017-11-17 RX ADMIN — Medication 1000 MILLIGRAM(S): at 22:55

## 2017-11-17 RX ADMIN — Medication 1000 MILLIGRAM(S): at 14:31

## 2017-11-17 RX ADMIN — Medication 400 MILLIGRAM(S): at 00:58

## 2017-11-17 NOTE — PROGRESS NOTE ADULT - SUBJECTIVE AND OBJECTIVE BOX
A Team Surgery Progress Note - pager [phone]    Patient is a 62y old  Female who presents with a chief complaint of SBO (16 Nov 2017 23:22)      SUBJECTIVE: Patient seen and examined on A team morning rounds. No acute events overnight. Negative for flatus/BMs overnight. No output from NG tube since coming to floor. Vitals stable.      OBJECTIVE:     ** Physical Exam **  Gen: Alert, NAD  Abd: Soft, mildly distended, suprapubic tenderness.     ** Vital signs / I&O's **    Vital Signs Last 24 Hrs  T(C): 36.6 (17 Nov 2017 06:09), Max: 37 (16 Nov 2017 22:45)  T(F): 97.9 (17 Nov 2017 06:09), Max: 98.6 (16 Nov 2017 22:45)  HR: 59 (17 Nov 2017 06:09) (59 - 73)  BP: 156/86 (17 Nov 2017 06:09) (142/75 - 171/84)  BP(mean): --  RR: 18 (17 Nov 2017 06:09) (15 - 18)  SpO2: 100% (17 Nov 2017 06:09) (97% - 100%)      16 Nov 2017 07:01  -  17 Nov 2017 07:00  --------------------------------------------------------  IN:    lactated ringers.: 125 mL  Total IN: 125 mL    OUT:  Total OUT: 0 mL    Total NET: 125 mL            ** Labs **                          13.1   10.56 )-----------( 292      ( 16 Nov 2017 19:21 )             41.5     16 Nov 2017 19:21    139    |  101    |  13     ----------------------------<  101    3.7     |  24     |  1.27     Ca    9.5        16 Nov 2017 19:21    TPro  8.5    /  Alb  4.1    /  TBili  0.3    /  DBili  x      /  AST  22     /  ALT  28     /  AlkPhos  60     16 Nov 2017 19:21    PT/INR - ( 16 Nov 2017 19:21 )   PT: 11.0 SEC;   INR: 0.98          PTT - ( 16 Nov 2017 19:21 )  PTT:30.3 SEC  CAPILLARY BLOOD GLUCOSE            LIVER FUNCTIONS - ( 16 Nov 2017 19:21 )  Alb: 4.1 g/dL / Pro: 8.5 g/dL / ALK PHOS: 60 u/L / ALT: 28 u/L / AST: 22 u/L / GGT: x               MEDICATIONS  (STANDING):  enoxaparin Injectable 40 milliGRAM(s) SubCutaneous every 24 hours  lactated ringers. 1000 milliLiter(s) (125 mL/Hr) IV Continuous <Continuous>  levothyroxine Injectable 100 MICROGram(s) IV Push daily  verapamil  milliGRAM(s) Oral daily    MEDICATIONS  (PRN):

## 2017-11-17 NOTE — PROGRESS NOTE ADULT - ASSESSMENT
61yo F with recurrent SBO    Plan:  - NPO, NGT decompression  - IV hydration  - serial abdominal exams  - await return of bowel function  - home meds  - DVT ppx

## 2017-11-18 LAB
BLD GP AB SCN SERPL QL: NEGATIVE — SIGNIFICANT CHANGE UP
BUN SERPL-MCNC: 5 MG/DL — LOW (ref 7–23)
CALCIUM SERPL-MCNC: 8.5 MG/DL — SIGNIFICANT CHANGE UP (ref 8.4–10.5)
CHLORIDE SERPL-SCNC: 106 MMOL/L — SIGNIFICANT CHANGE UP (ref 98–107)
CO2 SERPL-SCNC: 25 MMOL/L — SIGNIFICANT CHANGE UP (ref 22–31)
CREAT SERPL-MCNC: 1.1 MG/DL — SIGNIFICANT CHANGE UP (ref 0.5–1.3)
GLUCOSE SERPL-MCNC: 87 MG/DL — SIGNIFICANT CHANGE UP (ref 70–99)
HCT VFR BLD CALC: 35.3 % — SIGNIFICANT CHANGE UP (ref 34.5–45)
HGB BLD-MCNC: 11.2 G/DL — LOW (ref 11.5–15.5)
MAGNESIUM SERPL-MCNC: 2 MG/DL — SIGNIFICANT CHANGE UP (ref 1.6–2.6)
MCHC RBC-ENTMCNC: 29.9 PG — SIGNIFICANT CHANGE UP (ref 27–34)
MCHC RBC-ENTMCNC: 31.7 % — LOW (ref 32–36)
MCV RBC AUTO: 94.4 FL — SIGNIFICANT CHANGE UP (ref 80–100)
NRBC # FLD: 0 — SIGNIFICANT CHANGE UP
PHOSPHATE SERPL-MCNC: 3.5 MG/DL — SIGNIFICANT CHANGE UP (ref 2.5–4.5)
PLATELET # BLD AUTO: 265 K/UL — SIGNIFICANT CHANGE UP (ref 150–400)
PMV BLD: 11.2 FL — SIGNIFICANT CHANGE UP (ref 7–13)
POTASSIUM SERPL-MCNC: 3.7 MMOL/L — SIGNIFICANT CHANGE UP (ref 3.5–5.3)
POTASSIUM SERPL-SCNC: 3.7 MMOL/L — SIGNIFICANT CHANGE UP (ref 3.5–5.3)
RBC # BLD: 3.74 M/UL — LOW (ref 3.8–5.2)
RBC # FLD: 14.5 % — SIGNIFICANT CHANGE UP (ref 10.3–14.5)
RH IG SCN BLD-IMP: POSITIVE — SIGNIFICANT CHANGE UP
SODIUM SERPL-SCNC: 141 MMOL/L — SIGNIFICANT CHANGE UP (ref 135–145)
WBC # BLD: 6.62 K/UL — SIGNIFICANT CHANGE UP (ref 3.8–10.5)
WBC # FLD AUTO: 6.62 K/UL — SIGNIFICANT CHANGE UP (ref 3.8–10.5)

## 2017-11-18 PROCEDURE — 99232 SBSQ HOSP IP/OBS MODERATE 35: CPT | Mod: GC

## 2017-11-18 RX ORDER — POTASSIUM CHLORIDE 20 MEQ
20 PACKET (EA) ORAL
Qty: 0 | Refills: 0 | Status: COMPLETED | OUTPATIENT
Start: 2017-11-18 | End: 2017-11-18

## 2017-11-18 RX ORDER — VALSARTAN 80 MG/1
320 TABLET ORAL DAILY
Qty: 0 | Refills: 0 | Status: DISCONTINUED | OUTPATIENT
Start: 2017-11-18 | End: 2017-11-19

## 2017-11-18 RX ORDER — LEVOTHYROXINE SODIUM 125 MCG
200 TABLET ORAL DAILY
Qty: 0 | Refills: 0 | Status: DISCONTINUED | OUTPATIENT
Start: 2017-11-19 | End: 2017-11-19

## 2017-11-18 RX ORDER — SODIUM CHLORIDE 9 MG/ML
3 INJECTION INTRAMUSCULAR; INTRAVENOUS; SUBCUTANEOUS EVERY 8 HOURS
Qty: 0 | Refills: 0 | Status: DISCONTINUED | OUTPATIENT
Start: 2017-11-18 | End: 2017-11-19

## 2017-11-18 RX ORDER — HYDROCHLOROTHIAZIDE 25 MG
25 TABLET ORAL DAILY
Qty: 0 | Refills: 0 | Status: DISCONTINUED | OUTPATIENT
Start: 2017-11-18 | End: 2017-11-19

## 2017-11-18 RX ADMIN — DEXTROSE MONOHYDRATE, SODIUM CHLORIDE, AND POTASSIUM CHLORIDE 75 MILLILITER(S): 50; .745; 4.5 INJECTION, SOLUTION INTRAVENOUS at 08:37

## 2017-11-18 RX ADMIN — Medication 20 MILLIEQUIVALENT(S): at 13:54

## 2017-11-18 RX ADMIN — Medication 240 MILLIGRAM(S): at 12:12

## 2017-11-18 RX ADMIN — ENOXAPARIN SODIUM 40 MILLIGRAM(S): 100 INJECTION SUBCUTANEOUS at 06:01

## 2017-11-18 RX ADMIN — DEXTROSE MONOHYDRATE, SODIUM CHLORIDE, AND POTASSIUM CHLORIDE 125 MILLILITER(S): 50; .745; 4.5 INJECTION, SOLUTION INTRAVENOUS at 05:00

## 2017-11-18 RX ADMIN — Medication 100 MICROGRAM(S): at 06:01

## 2017-11-18 RX ADMIN — Medication 20 MILLIEQUIVALENT(S): at 20:11

## 2017-11-18 RX ADMIN — SODIUM CHLORIDE 3 MILLILITER(S): 9 INJECTION INTRAMUSCULAR; INTRAVENOUS; SUBCUTANEOUS at 22:59

## 2017-11-18 NOTE — PROGRESS NOTE ADULT - SUBJECTIVE AND OBJECTIVE BOX
A Team Surgery Progress Note - pager [phone]    Patient is a 62y old  Female who presents with a chief complaint of SBO (16 Nov 2017 23:22)      SUBJECTIVE: Patient seen and examined on A team morning rounds. No acute events overnight. NG tube has been clamped since last night. No nausea or vomiting. Has been passing gas frequently, no bowel movements yet. No pain. Has been ambulating and voiding. Will remove NG tube today and advance diet to clear liquid.      OBJECTIVE:     ** Physical Exam **  Gen: Alert, NAD  Abd: Soft, nontender, nondistended.    ** Vital signs / I&O's **    Vital Signs Last 24 Hrs  T(C): 36.7 (18 Nov 2017 06:00), Max: 36.7 (17 Nov 2017 14:04)  T(F): 98 (18 Nov 2017 06:00), Max: 98.1 (17 Nov 2017 14:04)  HR: 57 (18 Nov 2017 06:00) (55 - 61)  BP: 131/76 (18 Nov 2017 06:00) (131/76 - 152/90)  BP(mean): --  RR: 17 (18 Nov 2017 06:00) (16 - 18)  SpO2: 97% (18 Nov 2017 06:00) (97% - 100%)      17 Nov 2017 07:01  -  18 Nov 2017 07:00  --------------------------------------------------------  IN:    dextrose 5% + sodium chloride 0.45% with potassium chloride 20 mEq/L: 2000 mL    IV PiggyBack: 400 mL    lactated ringers.: 1000 mL  Total IN: 3400 mL    OUT:    Nasoenteral Tube: 125 mL    Voided: 1775 mL  Total OUT: 1900 mL    Total NET: 1500 mL    ** Labs **                          11.2   6.62  )-----------( 265      ( 18 Nov 2017 06:20 )             35.3     17 Nov 2017 07:15    140    |  105    |  10     ----------------------------<  86     3.4     |  24     |  1.09     Ca    8.3        17 Nov 2017 07:15  Mg     2.0       17 Nov 2017 07:15    TPro  8.5    /  Alb  4.1    /  TBili  0.3    /  DBili  x      /  AST  22     /  ALT  28     /  AlkPhos  60     16 Nov 2017 19:21    PT/INR - ( 16 Nov 2017 19:21 )   PT: 11.0 SEC;   INR: 0.98        PTT - ( 16 Nov 2017 19:21 )  PTT:30.3 SEC  CAPILLARY BLOOD GLUCOSE    LIVER FUNCTIONS - ( 16 Nov 2017 19:21 )  Alb: 4.1 g/dL / Pro: 8.5 g/dL / ALK PHOS: 60 u/L / ALT: 28 u/L / AST: 22 u/L / GGT: x           MEDICATIONS  (STANDING):  dextrose 5% + sodium chloride 0.45% with potassium chloride 20 mEq/L 1000 milliLiter(s) (75 mL/Hr) IV Continuous <Continuous>  enoxaparin Injectable 40 milliGRAM(s) SubCutaneous every 24 hours  levothyroxine Injectable 100 MICROGram(s) IV Push daily  verapamil  milliGRAM(s) Oral daily    MEDICATIONS  (PRN):  benzocaine 15 mG/menthol 3.6 mG Lozenge 1 Lozenge Oral every 3 hours PRN Sore Throat

## 2017-11-18 NOTE — PROGRESS NOTE ADULT - ASSESSMENT
63yo F with recurrent SBO    Plan:  - Remove NG tube  - Clear liquid diet  - Decrease IV fluids  - Monitor GI function  - Ambulate  - Home meds  - DVT ppx

## 2017-11-19 ENCOUNTER — TRANSCRIPTION ENCOUNTER (OUTPATIENT)
Age: 62
End: 2017-11-19

## 2017-11-19 VITALS
OXYGEN SATURATION: 100 % | TEMPERATURE: 98 F | SYSTOLIC BLOOD PRESSURE: 155 MMHG | RESPIRATION RATE: 18 BRPM | HEART RATE: 68 BPM | DIASTOLIC BLOOD PRESSURE: 95 MMHG

## 2017-11-19 LAB
HCT VFR BLD CALC: 33.7 % — LOW (ref 34.5–45)
HGB BLD-MCNC: 10.9 G/DL — LOW (ref 11.5–15.5)
MCHC RBC-ENTMCNC: 29.9 PG — SIGNIFICANT CHANGE UP (ref 27–34)
MCHC RBC-ENTMCNC: 32.3 % — SIGNIFICANT CHANGE UP (ref 32–36)
MCV RBC AUTO: 92.6 FL — SIGNIFICANT CHANGE UP (ref 80–100)
NRBC # FLD: 0 — SIGNIFICANT CHANGE UP
PLATELET # BLD AUTO: 247 K/UL — SIGNIFICANT CHANGE UP (ref 150–400)
PMV BLD: 10.8 FL — SIGNIFICANT CHANGE UP (ref 7–13)
RBC # BLD: 3.64 M/UL — LOW (ref 3.8–5.2)
RBC # FLD: 14.6 % — HIGH (ref 10.3–14.5)
WBC # BLD: 7.65 K/UL — SIGNIFICANT CHANGE UP (ref 3.8–10.5)
WBC # FLD AUTO: 7.65 K/UL — SIGNIFICANT CHANGE UP (ref 3.8–10.5)

## 2017-11-19 PROCEDURE — 99238 HOSP IP/OBS DSCHRG MGMT 30/<: CPT

## 2017-11-19 RX ADMIN — SODIUM CHLORIDE 3 MILLILITER(S): 9 INJECTION INTRAMUSCULAR; INTRAVENOUS; SUBCUTANEOUS at 05:56

## 2017-11-19 RX ADMIN — Medication 200 MICROGRAM(S): at 06:28

## 2017-11-19 RX ADMIN — ENOXAPARIN SODIUM 40 MILLIGRAM(S): 100 INJECTION SUBCUTANEOUS at 05:59

## 2017-11-19 RX ADMIN — Medication 25 MILLIGRAM(S): at 06:00

## 2017-11-19 NOTE — DISCHARGE NOTE ADULT - ADDITIONAL INSTRUCTIONS
Please schedule a follow up appointment with Dr. Waddell in 1-2 weeks. You can call his office at [phone].

## 2017-11-19 NOTE — PROGRESS NOTE ADULT - SUBJECTIVE AND OBJECTIVE BOX
A Team Surgery Progress Note - pager [phone]    Patient is a 62y old  Female who presents with a chief complaint of SBO (16 Nov 2017 23:22)      SUBJECTIVE: Patient seen and examined on A team morning rounds. No acute events overnight. Had a large bowel movement yesterday. Passing gas. Tolerating regular diet. Ambulating and voiding appropriately. No pain or nausea.      OBJECTIVE:     ** Physical Exam **  Gen: Alert, NAD  Abd: Soft, nontender, nondistended.    ** Vital signs / I&O's **    Vital Signs Last 24 Hrs  T(C): 36.6 (19 Nov 2017 05:54), Max: 36.9 (18 Nov 2017 22:07)  T(F): 97.9 (19 Nov 2017 05:54), Max: 98.5 (18 Nov 2017 22:07)  HR: 53 (19 Nov 2017 05:54) (53 - 62)  BP: 148/79 (19 Nov 2017 05:54) (137/69 - 154/79)  BP(mean): --  RR: 18 (19 Nov 2017 05:54) (16 - 18)  SpO2: 100% (19 Nov 2017 05:54) (97% - 100%)      18 Nov 2017 07:01  -  19 Nov 2017 07:00  --------------------------------------------------------  IN:    dextrose 5% + sodium chloride 0.45% with potassium chloride 20 mEq/L: 725 mL    Oral Fluid: 460 mL  Total IN: 1185 mL    OUT:    Voided: 1200 mL  Total OUT: 1200 mL    Total NET: -15 mL      ** Labs **                          10.9   7.65  )-----------( 247      ( 19 Nov 2017 05:48 )             33.7     18 Nov 2017 06:20    141    |  106    |  5      ----------------------------<  87     3.7     |  25     |  1.10     Ca    8.5        18 Nov 2017 06:20  Phos  3.5       18 Nov 2017 06:20  Mg     2.0       18 Nov 2017 06:20      MEDICATIONS  (STANDING):  enoxaparin Injectable 40 milliGRAM(s) SubCutaneous every 24 hours  hydrochlorothiazide 25 milliGRAM(s) Oral daily  levothyroxine 200 MICROGram(s) Oral daily  sodium chloride 0.9% lock flush 3 milliLiter(s) IV Push every 8 hours  valsartan 320 milliGRAM(s) Oral daily  verapamil  milliGRAM(s) Oral daily    MEDICATIONS  (PRN):  benzocaine 15 mG/menthol 3.6 mG Lozenge 1 Lozenge Oral every 3 hours PRN Sore Throat

## 2017-11-19 NOTE — DISCHARGE NOTE ADULT - MEDICATION SUMMARY - MEDICATIONS TO TAKE
I will START or STAY ON the medications listed below when I get home from the hospital:    verapamil 240 mg/24 hours oral capsule, extended release  -- 1 cap(s) by mouth every other day  -- Indication: For Hypertension    verapamil 360 mg/24 hours oral capsule, extended release  -- 1 cap(s) by mouth every other day  -- Indication: For Hypertension    hydrochlorothiazide-valsartan 25 mg-320 mg oral tablet  -- 1 tab(s) by mouth once a day  -- Indication: For Hypertension    levothyroxine 200 mcg (0.2 mg) oral tablet  -- 1 tab(s) by mouth once a day  -- Indication: For Hypothyroidism

## 2017-11-19 NOTE — DISCHARGE NOTE ADULT - HOSPITAL COURSE
62 year old female with history of multiple previous SBOs managed conservatively presented to the ED on 11/16/19 with one day of crampy abdominal pain. Pt reported passing gas and having BMs at 10am that morning and then having severe sharp gas-like pain. No nausea, vomiting, fevers, chills or diarrhea. Pt reported this felt similar to her previous SBOs. The most recent one was in August. She stated that usually they resolve in 2-3 days with NGT decompression. She was admitted and NG tube was placed. NG tube output decreased on 11/17/17 and was clamped overnight. He began passing gas and the NG tube was removed. She had a bowel movement on 11/18/17 and was advanced to a regular diet. Pain is now gone. Pt is tolerating a diet, voiding and ambulating. Pt is ready for discharge in stable condition. Pt will follow up with Dr. Waddell as an outpatient in one to two weeks.

## 2017-11-19 NOTE — DISCHARGE NOTE ADULT - CARE PROVIDER_API CALL
Que Waddell), ColonRectal Surgery; Surgery  [address] [place] [address]  Phone: [phone]  Fax: [phone]

## 2017-11-19 NOTE — PROGRESS NOTE ADULT - ASSESSMENT
63yo F with recurrent SBO, now resolved    Plan:  - Low residue diet  - Monitor GI function  - Ambulate  - Home meds  - DVT ppx

## 2017-11-19 NOTE — DISCHARGE NOTE ADULT - PATIENT PORTAL LINK FT
“You can access the [facility] Patient Portal, offered by [facility], by registering with the following website: [email or web address]”

## 2017-11-19 NOTE — DISCHARGE NOTE ADULT - INSTRUCTIONS
Increasing pain not relieved with pain medication followed by nausea and vomiting, increasing stomach bloating, fever call MD.

## 2017-11-19 NOTE — DISCHARGE NOTE ADULT - CARE PLAN
Principal Discharge DX:	Small bowel obstruction  Goal:	Return to level of activity prior to admission  Instructions for follow-up, activity and diet:	Please follow up with Dr. Waddell within 1-2 weeks after discharge from the hospital. You may call [phone] to schedule an appointment.

## 2017-12-15 ENCOUNTER — RX RENEWAL (OUTPATIENT)
Age: 62
End: 2017-12-15

## 2018-01-30 ENCOUNTER — RX RENEWAL (OUTPATIENT)
Age: 63
End: 2018-01-30

## 2018-02-01 ENCOUNTER — APPOINTMENT (OUTPATIENT)
Dept: INTERNAL MEDICINE | Facility: CLINIC | Age: 63
End: 2018-02-01
Payer: COMMERCIAL

## 2018-02-01 VITALS
BODY MASS INDEX: 41.12 KG/M2 | WEIGHT: 262 LBS | HEART RATE: 65 BPM | OXYGEN SATURATION: 96 % | DIASTOLIC BLOOD PRESSURE: 96 MMHG | SYSTOLIC BLOOD PRESSURE: 154 MMHG | HEIGHT: 67 IN

## 2018-02-01 PROCEDURE — 99213 OFFICE O/P EST LOW 20 MIN: CPT

## 2018-02-01 RX ORDER — GABAPENTIN 100 MG/1
100 CAPSULE ORAL 3 TIMES DAILY
Qty: 90 | Refills: 0 | Status: DISCONTINUED | COMMUNITY
Start: 2017-12-15 | End: 2018-02-01

## 2018-03-02 LAB
25(OH)D3 SERPL-MCNC: 39.2 NG/ML
ALBUMIN SERPL ELPH-MCNC: 3.9 G/DL
ALP BLD-CCNC: 66 U/L
ALT SERPL-CCNC: 9 U/L
ANION GAP SERPL CALC-SCNC: 16 MMOL/L
APPEARANCE: CLEAR
AST SERPL-CCNC: 14 U/L
BACTERIA: ABNORMAL
BASOPHILS # BLD AUTO: 0.01 K/UL
BASOPHILS NFR BLD AUTO: 0.1 %
BILIRUB SERPL-MCNC: 0.2 MG/DL
BILIRUBIN URINE: NEGATIVE
BLOOD URINE: NEGATIVE
BUN SERPL-MCNC: 21 MG/DL
CALCIUM SERPL-MCNC: 10 MG/DL
CHLORIDE SERPL-SCNC: 102 MMOL/L
CHOLEST SERPL-MCNC: 199 MG/DL
CHOLEST/HDLC SERPL: 1.9 RATIO
CO2 SERPL-SCNC: 25 MMOL/L
COLOR: YELLOW
CREAT SERPL-MCNC: 1.32 MG/DL
EOSINOPHIL # BLD AUTO: 0.22 K/UL
EOSINOPHIL NFR BLD AUTO: 3 %
GLUCOSE QUALITATIVE U: NEGATIVE MG/DL
GLUCOSE SERPL-MCNC: 81 MG/DL
HBA1C MFR BLD HPLC: 5.6 %
HCT VFR BLD CALC: 38.9 %
HDLC SERPL-MCNC: 105 MG/DL
HGB BLD-MCNC: 12 G/DL
HYALINE CASTS: 2 /LPF
IMM GRANULOCYTES NFR BLD AUTO: 0.1 %
KETONES URINE: NEGATIVE
LDLC SERPL CALC-MCNC: 78 MG/DL
LEUKOCYTE ESTERASE URINE: ABNORMAL
LYMPHOCYTES # BLD AUTO: 2.03 K/UL
LYMPHOCYTES NFR BLD AUTO: 27.8 %
MAN DIFF?: NORMAL
MCHC RBC-ENTMCNC: 29.8 PG
MCHC RBC-ENTMCNC: 30.8 GM/DL
MCV RBC AUTO: 96.5 FL
MICROSCOPIC-UA: NORMAL
MONOCYTES # BLD AUTO: 0.54 K/UL
MONOCYTES NFR BLD AUTO: 7.4 %
NEUTROPHILS # BLD AUTO: 4.5 K/UL
NEUTROPHILS NFR BLD AUTO: 61.6 %
NITRITE URINE: NEGATIVE
PH URINE: 6.5
PLATELET # BLD AUTO: 274 K/UL
POTASSIUM SERPL-SCNC: 4.1 MMOL/L
PROT SERPL-MCNC: 8.1 G/DL
PROTEIN URINE: NEGATIVE MG/DL
RBC # BLD: 4.03 M/UL
RBC # FLD: 15 %
RED BLOOD CELLS URINE: 1 /HPF
SODIUM SERPL-SCNC: 143 MMOL/L
SPECIFIC GRAVITY URINE: 1.02
SQUAMOUS EPITHELIAL CELLS: 5 /HPF
T4 FREE SERPL-MCNC: 1.5 NG/DL
TRIGL SERPL-MCNC: 80 MG/DL
TSH SERPL-ACNC: 5.51 UIU/ML
UROBILINOGEN URINE: NEGATIVE MG/DL
WBC # FLD AUTO: 7.31 K/UL
WHITE BLOOD CELLS URINE: 29 /HPF

## 2018-03-09 ENCOUNTER — INPATIENT (INPATIENT)
Facility: HOSPITAL | Age: 63
LOS: 4 days | Discharge: HOME | End: 2018-03-14
Attending: SPECIALIST | Admitting: SPECIALIST
Payer: COMMERCIAL

## 2018-03-09 VITALS
TEMPERATURE: 99 F | RESPIRATION RATE: 16 BRPM | HEART RATE: 87 BPM | DIASTOLIC BLOOD PRESSURE: 76 MMHG | SYSTOLIC BLOOD PRESSURE: 142 MMHG | OXYGEN SATURATION: 100 %

## 2018-03-09 RX ORDER — ACETAMINOPHEN 500 MG
1000 TABLET ORAL ONCE
Qty: 0 | Refills: 0 | Status: COMPLETED | OUTPATIENT
Start: 2018-03-09 | End: 2018-03-10

## 2018-03-09 RX ORDER — ONDANSETRON 8 MG/1
4 TABLET, FILM COATED ORAL ONCE
Qty: 0 | Refills: 0 | Status: COMPLETED | OUTPATIENT
Start: 2018-03-09 | End: 2018-03-09

## 2018-03-09 RX ORDER — SODIUM CHLORIDE 9 MG/ML
1000 INJECTION, SOLUTION INTRAVENOUS ONCE
Qty: 0 | Refills: 0 | Status: COMPLETED | OUTPATIENT
Start: 2018-03-09 | End: 2018-03-09

## 2018-03-09 RX ORDER — SODIUM CHLORIDE 9 MG/ML
1000 INJECTION, SOLUTION INTRAVENOUS
Qty: 0 | Refills: 0 | Status: DISCONTINUED | OUTPATIENT
Start: 2018-03-09 | End: 2018-03-10

## 2018-03-09 NOTE — ED PROVIDER NOTE - ATTENDING CONTRIBUTION TO CARE
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  On exam awake & alert, mild distress, mmm, lungs CTAB, RRR, abdomen soft mild nonfocal tenderness no rebound no guarding, 2+ pulses b/l, neuro A&Ox3, skin warm and dry no rash

## 2018-03-09 NOTE — ED PROVIDER NOTE - MEDICAL DECISION MAKING DETAILS
63yof w/ multiple SBOs likely w/ recurrent obstruction. No active vomiting. Will keep NPO, start IV hydration, analgesia and antiemetics, and CT abd/pelvis.

## 2018-03-09 NOTE — ED PROVIDER NOTE - OBJECTIVE STATEMENT
63yof w/ HTN, recurrent SBOs due to adhesions p/w abd pain and distension. Onset of distension suddenly this morning but no pain. Around 3pm had a bowel movement, followed by acute onset of diffuse abdominal pain and nausea. No vomiting, no BM or flatus since onset of pain. Feels like previous obstructions. Last admission to [facility] in Nov 2017, managed conservatively.

## 2018-03-09 NOTE — ED ADULT TRIAGE NOTE - CHIEF COMPLAINT QUOTE
Pt C/O ABD pain, nausea and ABD distension starting today. Pt state pain is james-umbilical radiating upwards. Pt states she has PMH 8 Small bowel obstructions, was admitted here 1 month ago for SBO: this pain feels similar. Last BM today: soft stool. Pt denies CP, Urinary Symptoms, SOB. Pt appears uncomfortable.

## 2018-03-10 DIAGNOSIS — K56.690 OTHER PARTIAL INTESTINAL OBSTRUCTION: ICD-10-CM

## 2018-03-10 DIAGNOSIS — K56.609 UNSPECIFIED INTESTINAL OBSTRUCTION, UNSPECIFIED AS TO PARTIAL VERSUS COMPLETE OBSTRUCTION: ICD-10-CM

## 2018-03-10 LAB
ALBUMIN SERPL ELPH-MCNC: 4.2 G/DL — SIGNIFICANT CHANGE UP (ref 3.3–5)
ALP SERPL-CCNC: 60 U/L — SIGNIFICANT CHANGE UP (ref 40–120)
ALT FLD-CCNC: 13 U/L — SIGNIFICANT CHANGE UP (ref 4–33)
APTT BLD: 31.7 SEC — SIGNIFICANT CHANGE UP (ref 27.5–37.4)
AST SERPL-CCNC: 19 U/L — SIGNIFICANT CHANGE UP (ref 4–32)
BASE EXCESS BLDV CALC-SCNC: 4.2 MMOL/L — SIGNIFICANT CHANGE UP
BASOPHILS # BLD AUTO: 0.01 K/UL — SIGNIFICANT CHANGE UP (ref 0–0.2)
BASOPHILS NFR BLD AUTO: 0.1 % — SIGNIFICANT CHANGE UP (ref 0–2)
BILIRUB SERPL-MCNC: 0.3 MG/DL — SIGNIFICANT CHANGE UP (ref 0.2–1.2)
BLD GP AB SCN SERPL QL: NEGATIVE — SIGNIFICANT CHANGE UP
BLOOD GAS VENOUS - CREATININE: 1.22 MG/DL — SIGNIFICANT CHANGE UP (ref 0.5–1.3)
BUN SERPL-MCNC: 19 MG/DL — SIGNIFICANT CHANGE UP (ref 7–23)
CALCIUM SERPL-MCNC: 9.4 MG/DL — SIGNIFICANT CHANGE UP (ref 8.4–10.5)
CHLORIDE BLDV-SCNC: 105 MMOL/L — SIGNIFICANT CHANGE UP (ref 96–108)
CHLORIDE SERPL-SCNC: 103 MMOL/L — SIGNIFICANT CHANGE UP (ref 98–107)
CO2 SERPL-SCNC: 26 MMOL/L — SIGNIFICANT CHANGE UP (ref 22–31)
CREAT SERPL-MCNC: 1.35 MG/DL — HIGH (ref 0.5–1.3)
EOSINOPHIL # BLD AUTO: 0.01 K/UL — SIGNIFICANT CHANGE UP (ref 0–0.5)
EOSINOPHIL NFR BLD AUTO: 0.1 % — SIGNIFICANT CHANGE UP (ref 0–6)
GAS PNL BLDV: 139 MMOL/L — SIGNIFICANT CHANGE UP (ref 136–146)
GLUCOSE BLDV-MCNC: 112 — HIGH (ref 70–99)
GLUCOSE SERPL-MCNC: 108 MG/DL — HIGH (ref 70–99)
HCO3 BLDV-SCNC: 27 MMOL/L — SIGNIFICANT CHANGE UP (ref 20–27)
HCT VFR BLD CALC: 38.3 % — SIGNIFICANT CHANGE UP (ref 34.5–45)
HCT VFR BLDV CALC: 35.7 % — SIGNIFICANT CHANGE UP (ref 34.5–45)
HGB BLD-MCNC: 12.5 G/DL — SIGNIFICANT CHANGE UP (ref 11.5–15.5)
HGB BLDV-MCNC: 11.6 G/DL — SIGNIFICANT CHANGE UP (ref 11.5–15.5)
IMM GRANULOCYTES # BLD AUTO: 0.03 # — SIGNIFICANT CHANGE UP
IMM GRANULOCYTES NFR BLD AUTO: 0.4 % — SIGNIFICANT CHANGE UP (ref 0–1.5)
INR BLD: 0.98 — SIGNIFICANT CHANGE UP (ref 0.88–1.17)
LACTATE BLDV-MCNC: 1.2 MMOL/L — SIGNIFICANT CHANGE UP (ref 0.5–2)
LYMPHOCYTES # BLD AUTO: 0.72 K/UL — LOW (ref 1–3.3)
LYMPHOCYTES # BLD AUTO: 8.7 % — LOW (ref 13–44)
MAGNESIUM SERPL-MCNC: 2.3 MG/DL — SIGNIFICANT CHANGE UP (ref 1.6–2.6)
MCHC RBC-ENTMCNC: 31 PG — SIGNIFICANT CHANGE UP (ref 27–34)
MCHC RBC-ENTMCNC: 32.6 % — SIGNIFICANT CHANGE UP (ref 32–36)
MCV RBC AUTO: 95 FL — SIGNIFICANT CHANGE UP (ref 80–100)
MONOCYTES # BLD AUTO: 0.41 K/UL — SIGNIFICANT CHANGE UP (ref 0–0.9)
MONOCYTES NFR BLD AUTO: 4.9 % — SIGNIFICANT CHANGE UP (ref 2–14)
NEUTROPHILS # BLD AUTO: 7.13 K/UL — SIGNIFICANT CHANGE UP (ref 1.8–7.4)
NEUTROPHILS NFR BLD AUTO: 85.8 % — HIGH (ref 43–77)
NRBC # FLD: 0 — SIGNIFICANT CHANGE UP
PCO2 BLDV: 49 MMHG — SIGNIFICANT CHANGE UP (ref 41–51)
PH BLDV: 7.39 PH — SIGNIFICANT CHANGE UP (ref 7.32–7.43)
PHOSPHATE SERPL-MCNC: 3 MG/DL — SIGNIFICANT CHANGE UP (ref 2.5–4.5)
PLATELET # BLD AUTO: 287 K/UL — SIGNIFICANT CHANGE UP (ref 150–400)
PMV BLD: 10.9 FL — SIGNIFICANT CHANGE UP (ref 7–13)
PO2 BLDV: 34 MMHG — LOW (ref 35–40)
POTASSIUM BLDV-SCNC: 3.6 MMOL/L — SIGNIFICANT CHANGE UP (ref 3.4–4.5)
POTASSIUM SERPL-MCNC: 3.5 MMOL/L — SIGNIFICANT CHANGE UP (ref 3.5–5.3)
POTASSIUM SERPL-SCNC: 3.5 MMOL/L — SIGNIFICANT CHANGE UP (ref 3.5–5.3)
PROT SERPL-MCNC: 8.8 G/DL — HIGH (ref 6–8.3)
PROTHROM AB SERPL-ACNC: 11.3 SEC — SIGNIFICANT CHANGE UP (ref 9.8–13.1)
RBC # BLD: 4.03 M/UL — SIGNIFICANT CHANGE UP (ref 3.8–5.2)
RBC # FLD: 14.3 % — SIGNIFICANT CHANGE UP (ref 10.3–14.5)
RH IG SCN BLD-IMP: POSITIVE — SIGNIFICANT CHANGE UP
SAO2 % BLDV: 60 % — SIGNIFICANT CHANGE UP (ref 60–85)
SODIUM SERPL-SCNC: 143 MMOL/L — SIGNIFICANT CHANGE UP (ref 135–145)
WBC # BLD: 8.31 K/UL — SIGNIFICANT CHANGE UP (ref 3.8–10.5)
WBC # FLD AUTO: 8.31 K/UL — SIGNIFICANT CHANGE UP (ref 3.8–10.5)

## 2018-03-10 PROCEDURE — 74177 CT ABD & PELVIS W/CONTRAST: CPT | Mod: 26

## 2018-03-10 PROCEDURE — 71045 X-RAY EXAM CHEST 1 VIEW: CPT | Mod: 26

## 2018-03-10 RX ORDER — LIDOCAINE 4 G/100G
1 CREAM TOPICAL ONCE
Qty: 0 | Refills: 0 | Status: COMPLETED | OUTPATIENT
Start: 2018-03-10 | End: 2018-03-10

## 2018-03-10 RX ORDER — LEVOTHYROXINE SODIUM 125 MCG
100 TABLET ORAL AT BEDTIME
Qty: 0 | Refills: 0 | Status: DISCONTINUED | OUTPATIENT
Start: 2018-03-10 | End: 2018-03-10

## 2018-03-10 RX ORDER — ACETAMINOPHEN 500 MG
1000 TABLET ORAL ONCE
Qty: 0 | Refills: 0 | Status: COMPLETED | OUTPATIENT
Start: 2018-03-10 | End: 2018-03-10

## 2018-03-10 RX ORDER — HEPARIN SODIUM 5000 [USP'U]/ML
5000 INJECTION INTRAVENOUS; SUBCUTANEOUS EVERY 8 HOURS
Qty: 0 | Refills: 0 | Status: DISCONTINUED | OUTPATIENT
Start: 2018-03-10 | End: 2018-03-14

## 2018-03-10 RX ORDER — MORPHINE SULFATE 50 MG/1
4 CAPSULE, EXTENDED RELEASE ORAL ONCE
Qty: 0 | Refills: 0 | Status: DISCONTINUED | OUTPATIENT
Start: 2018-03-10 | End: 2018-03-10

## 2018-03-10 RX ORDER — DEXTROSE MONOHYDRATE, SODIUM CHLORIDE, AND POTASSIUM CHLORIDE 50; .745; 4.5 G/1000ML; G/1000ML; G/1000ML
1000 INJECTION, SOLUTION INTRAVENOUS
Qty: 0 | Refills: 0 | Status: DISCONTINUED | OUTPATIENT
Start: 2018-03-10 | End: 2018-03-14

## 2018-03-10 RX ORDER — SODIUM CHLORIDE 9 MG/ML
1000 INJECTION, SOLUTION INTRAVENOUS
Qty: 0 | Refills: 0 | Status: DISCONTINUED | OUTPATIENT
Start: 2018-03-10 | End: 2018-03-10

## 2018-03-10 RX ORDER — TETRACAINE/BENZOCAINE/BUTAMBEN 2%-14%-2%
1 OINTMENT (GRAM) TOPICAL ONCE
Qty: 0 | Refills: 0 | Status: COMPLETED | OUTPATIENT
Start: 2018-03-10 | End: 2018-03-10

## 2018-03-10 RX ORDER — INFLUENZA VIRUS VACCINE 15; 15; 15; 15 UG/.5ML; UG/.5ML; UG/.5ML; UG/.5ML
0.5 SUSPENSION INTRAMUSCULAR ONCE
Qty: 0 | Refills: 0 | Status: DISCONTINUED | OUTPATIENT
Start: 2018-03-10 | End: 2018-03-14

## 2018-03-10 RX ORDER — LEVOTHYROXINE SODIUM 125 MCG
100 TABLET ORAL AT BEDTIME
Qty: 0 | Refills: 0 | Status: DISCONTINUED | OUTPATIENT
Start: 2018-03-10 | End: 2018-03-13

## 2018-03-10 RX ADMIN — Medication 1.25 MILLIGRAM(S): at 14:58

## 2018-03-10 RX ADMIN — Medication 400 MILLIGRAM(S): at 00:08

## 2018-03-10 RX ADMIN — MORPHINE SULFATE 4 MILLIGRAM(S): 50 CAPSULE, EXTENDED RELEASE ORAL at 04:00

## 2018-03-10 RX ADMIN — SODIUM CHLORIDE 125 MILLILITER(S): 9 INJECTION, SOLUTION INTRAVENOUS at 07:35

## 2018-03-10 RX ADMIN — Medication 400 MILLIGRAM(S): at 19:06

## 2018-03-10 RX ADMIN — ONDANSETRON 4 MILLIGRAM(S): 8 TABLET, FILM COATED ORAL at 00:08

## 2018-03-10 RX ADMIN — HEPARIN SODIUM 5000 UNIT(S): 5000 INJECTION INTRAVENOUS; SUBCUTANEOUS at 14:58

## 2018-03-10 RX ADMIN — SODIUM CHLORIDE 1000 MILLILITER(S): 9 INJECTION, SOLUTION INTRAVENOUS at 00:09

## 2018-03-10 RX ADMIN — LIDOCAINE 1 APPLICATION(S): 4 CREAM TOPICAL at 03:46

## 2018-03-10 RX ADMIN — Medication 100 MICROGRAM(S): at 22:22

## 2018-03-10 RX ADMIN — DEXTROSE MONOHYDRATE, SODIUM CHLORIDE, AND POTASSIUM CHLORIDE 125 MILLILITER(S): 50; .745; 4.5 INJECTION, SOLUTION INTRAVENOUS at 18:03

## 2018-03-10 RX ADMIN — Medication 1.25 MILLIGRAM(S): at 22:22

## 2018-03-10 RX ADMIN — HEPARIN SODIUM 5000 UNIT(S): 5000 INJECTION INTRAVENOUS; SUBCUTANEOUS at 22:23

## 2018-03-10 RX ADMIN — HEPARIN SODIUM 5000 UNIT(S): 5000 INJECTION INTRAVENOUS; SUBCUTANEOUS at 07:35

## 2018-03-10 RX ADMIN — Medication 1 SPRAY(S): at 03:46

## 2018-03-10 RX ADMIN — MORPHINE SULFATE 4 MILLIGRAM(S): 50 CAPSULE, EXTENDED RELEASE ORAL at 03:40

## 2018-03-10 RX ADMIN — Medication 1000 MILLIGRAM(S): at 00:33

## 2018-03-10 RX ADMIN — Medication 1000 MILLIGRAM(S): at 19:35

## 2018-03-10 RX ADMIN — SODIUM CHLORIDE 125 MILLILITER(S): 9 INJECTION, SOLUTION INTRAVENOUS at 00:33

## 2018-03-10 NOTE — ED ADULT NURSE NOTE - OBJECTIVE STATEMENT
pt received to the ed came in c/o abd rated as 9/10. pt a&ox4 and ambulatory at baseline. Pt skin intact, respirations even and unlabored. pt abd tender to palpation. pt endorses nausea, but denies vomiting and diarrhea. pt reports last bm was this afternoon around 2pm. Pt has a 20 gauge in left ac placed by resident Jimenez. Pt medicated as per ordered, vss. Will continue to monitor.

## 2018-03-10 NOTE — H&P ADULT - HISTORY OF PRESENT ILLNESS
63y female - h/o multiple prior adhesive SBOs (sx h/o laparoscopic left partial nephrectomy, KANDY, and dx laparoscopy), all managed non-operatively - presents with 24 hours of progressively worsening abdominal pain and nausea. She also reports chills at home. No emesis. She was passing flatus at home, and had 3 bowel movements on the day prior to presentation.    NGT placed by ED staff. Received a crystalloid bolus of 1000 mL NS. 63y female - h/o multiple prior adhesive SBOs (sx h/o laparoscopic left partial nephrectomy, KANDY, and dx laparoscopy), all managed non-operatively - presents with 24 hours of progressively worsening abdominal pain and nausea. She also reports chills at home. No emesis. She was passing flatus at home, and had 3 bowel movements on the day prior to presentation. Last PO intake was the morning prior.    NGT placed by ED staff. Received a crystalloid bolus of 1000 mL NS.

## 2018-03-10 NOTE — H&P ADULT - NSHPPHYSICALEXAM_GEN_ALL_CORE
Vital Signs Last 24 Hrs  T(C): 36.8 (10 Mar 2018 02:25), Max: 37.3 (09 Mar 2018 22:22)  T(F): 98.3 (10 Mar 2018 02:25), Max: 99.1 (09 Mar 2018 22:22)  HR: 78 (10 Mar 2018 04:48) (77 - 87)  BP: 144/64 (10 Mar 2018 04:48) (119/67 - 144/64)  BP(mean): --  RR: 16 (10 Mar 2018 04:48) (16 - 16)  SpO2: 98% (10 Mar 2018 04:48) (98% - 100%)    GEN: Mildl distress, alert and oriented x 3  HEENT: NGT in place  CHEST: Symmetrical chest rise, breath sounds CTAB  HEART: RRR, non-muffled heart sounds  ABD: Obese. Mildly distended. Soft. Rebound tenderness in the LUQ, diffuse tenderness to deep palpation.  EXT: No erythema/edema. Warm, sensate, motor function intact

## 2018-03-10 NOTE — H&P ADULT - NSHPLABSRESULTS_GEN_ALL_CORE
12.5   8.31  )-----------( 287      ( 09 Mar 2018 23:50 )             38.3                 PT/INR - ( 09 Mar 2018 23:50 )   PT: 11.3 SEC;   INR: 0.98          PTT - ( 09 Mar 2018 23:50 )  PTT:31.7 SEC    03-09    143  |  103  |  19  ----------------------------<  108<H>  3.5   |  26  |  1.35<H>    Ca    9.4      09 Mar 2018 23:50  Phos  3.0     03-09  Mg     2.3     03-09    TPro  8.8<H>  /  Alb  4.2  /  TBili  0.3  /  DBili  x   /  AST  19  /  ALT  13  /  AlkPhos  60  03-09      LIVER FUNCTIONS - ( 09 Mar 2018 23:50 )  Alb: 4.2 g/dL / Pro: 8.8 g/dL / ALK PHOS: 60 u/L / ALT: 13 u/L / AST: 19 u/L / GGT: x                 IMAGING  < from: CT Abdomen and Pelvis w/ IV Cont (03.10.18 @ 01:36) >    IMPRESSION: Redemonstration of small bowel obstruction with similar   transition point in the right hemipelvis and continued fecalization of   distal jeujunal/proximal ileum loop as described. No extraluminal   collection, pneumatosis or secondary sign of ischemia.    < end of copied text >

## 2018-03-10 NOTE — H&P ADULT - PROBLEM SELECTOR PLAN 1
- Admit to Surgery  - NPO/IVF  - NGT to low continuous wall suction (placement confirmed on CXR)  - Serial abdominal examinations  - No narcotics to be administered, per Attending  - Non-operative management, for now

## 2018-03-10 NOTE — ED ADULT NURSE NOTE - CHIEF COMPLAINT
[address] PHYSICAL THERAPY  [address] [place] [address] Gabriel Braun, [address] - Phone: [phone]  Fax: [phone] SUMMARY  Patient Name: Helena Guzman : 1982   Treatment/Medical Diagnosis: Lower back pain [M54.5]   Referral Source: Pat Stone NP     Date of Initial Visit: 17 Attended Visits: 7 Missed Visits: 0     SUMMARY OF TREATMENT  Patient's POC has consisted of therex, therapeutic activities, manual therapy prn, modalities prn, pt. education, and a comprehensive HEP. Treatment strategies used to address functional mobility deficits, ROM deficits, strength deficits, analyze and address soft tissue restrictions, analyze and cue movement patterns, analyze and modify body mechanics/ergonomics, assess and modify postural abnormalities and instruct in home and community integration. CURRENT STATUS  Patient has made significant improvements in pain relief. She reports no difficulty in performing work duties and is pain free with ADL's. She has resumed recreational exercises, including running across the [employer] and back in 40' without LBP. Patient is independent in performing her HEP    GOALS/MEASURE OF PROGRESS Goal Met? · Short Term Goals: To be accomplished in  2-3  Weeks:  1. Patient will be compliant with HEP in order to facilitate progression of therapeutic exercise and improve mobility  2. Patient will demonstrate lumbar AROM WNL and pain free in order to progress towards high level functional exercises  3. Patient will improve FOTO score to 60/100 to demonstrate a meaningful improvement in functional mobility and increased quality of life     · Long Term Goals: To be accomplished in  4-6  Weeks:  1. Patient will be independent with HEP in order to demonstrate ability to perform therapeutic exercises and continue progressing functional mobility upon D/C  2.  Patient will demonstrate 50# box lift from elevated surface 2 x 5 trials to Maria A Casey \"BJ\" Alessandro Fonseac, DPT, Cert. MDT, Cert. DN, [facility] Physical Therapy: [phone]  The patient is a 63y Female complaining of abdominal pain.

## 2018-03-11 LAB
BUN SERPL-MCNC: 10 MG/DL — SIGNIFICANT CHANGE UP (ref 7–23)
CALCIUM SERPL-MCNC: 8.5 MG/DL — SIGNIFICANT CHANGE UP (ref 8.4–10.5)
CHLORIDE SERPL-SCNC: 102 MMOL/L — SIGNIFICANT CHANGE UP (ref 98–107)
CO2 SERPL-SCNC: 25 MMOL/L — SIGNIFICANT CHANGE UP (ref 22–31)
CREAT SERPL-MCNC: 1.09 MG/DL — SIGNIFICANT CHANGE UP (ref 0.5–1.3)
GLUCOSE SERPL-MCNC: 101 MG/DL — HIGH (ref 70–99)
HCT VFR BLD CALC: 38.1 % — SIGNIFICANT CHANGE UP (ref 34.5–45)
HGB BLD-MCNC: 11.8 G/DL — SIGNIFICANT CHANGE UP (ref 11.5–15.5)
MAGNESIUM SERPL-MCNC: 2 MG/DL — SIGNIFICANT CHANGE UP (ref 1.6–2.6)
MCHC RBC-ENTMCNC: 29.6 PG — SIGNIFICANT CHANGE UP (ref 27–34)
MCHC RBC-ENTMCNC: 31 % — LOW (ref 32–36)
MCV RBC AUTO: 95.5 FL — SIGNIFICANT CHANGE UP (ref 80–100)
NRBC # FLD: 0 — SIGNIFICANT CHANGE UP
PHOSPHATE SERPL-MCNC: 2.4 MG/DL — LOW (ref 2.5–4.5)
PLATELET # BLD AUTO: 276 K/UL — SIGNIFICANT CHANGE UP (ref 150–400)
PMV BLD: 11.1 FL — SIGNIFICANT CHANGE UP (ref 7–13)
POTASSIUM SERPL-MCNC: 3.5 MMOL/L — SIGNIFICANT CHANGE UP (ref 3.5–5.3)
POTASSIUM SERPL-SCNC: 3.5 MMOL/L — SIGNIFICANT CHANGE UP (ref 3.5–5.3)
RBC # BLD: 3.99 M/UL — SIGNIFICANT CHANGE UP (ref 3.8–5.2)
RBC # FLD: 14.1 % — SIGNIFICANT CHANGE UP (ref 10.3–14.5)
SODIUM SERPL-SCNC: 142 MMOL/L — SIGNIFICANT CHANGE UP (ref 135–145)
WBC # BLD: 8.15 K/UL — SIGNIFICANT CHANGE UP (ref 3.8–10.5)
WBC # FLD AUTO: 8.15 K/UL — SIGNIFICANT CHANGE UP (ref 3.8–10.5)

## 2018-03-11 RX ORDER — POTASSIUM PHOSPHATE, MONOBASIC POTASSIUM PHOSPHATE, DIBASIC 236; 224 MG/ML; MG/ML
15 INJECTION, SOLUTION INTRAVENOUS ONCE
Qty: 0 | Refills: 0 | Status: COMPLETED | OUTPATIENT
Start: 2018-03-11 | End: 2018-03-11

## 2018-03-11 RX ORDER — ACETAMINOPHEN 500 MG
1000 TABLET ORAL ONCE
Qty: 0 | Refills: 0 | Status: COMPLETED | OUTPATIENT
Start: 2018-03-11 | End: 2018-03-11

## 2018-03-11 RX ADMIN — Medication 100 MICROGRAM(S): at 22:27

## 2018-03-11 RX ADMIN — Medication 1.25 MILLIGRAM(S): at 10:01

## 2018-03-11 RX ADMIN — HEPARIN SODIUM 5000 UNIT(S): 5000 INJECTION INTRAVENOUS; SUBCUTANEOUS at 05:44

## 2018-03-11 RX ADMIN — Medication 400 MILLIGRAM(S): at 23:09

## 2018-03-11 RX ADMIN — Medication 1000 MILLIGRAM(S): at 11:39

## 2018-03-11 RX ADMIN — Medication 1.25 MILLIGRAM(S): at 03:18

## 2018-03-11 RX ADMIN — Medication 400 MILLIGRAM(S): at 11:23

## 2018-03-11 RX ADMIN — Medication 1.25 MILLIGRAM(S): at 15:54

## 2018-03-11 RX ADMIN — HEPARIN SODIUM 5000 UNIT(S): 5000 INJECTION INTRAVENOUS; SUBCUTANEOUS at 21:16

## 2018-03-11 RX ADMIN — POTASSIUM PHOSPHATE, MONOBASIC POTASSIUM PHOSPHATE, DIBASIC 62.5 MILLIMOLE(S): 236; 224 INJECTION, SOLUTION INTRAVENOUS at 11:48

## 2018-03-11 RX ADMIN — HEPARIN SODIUM 5000 UNIT(S): 5000 INJECTION INTRAVENOUS; SUBCUTANEOUS at 14:05

## 2018-03-11 RX ADMIN — DEXTROSE MONOHYDRATE, SODIUM CHLORIDE, AND POTASSIUM CHLORIDE 125 MILLILITER(S): 50; .745; 4.5 INJECTION, SOLUTION INTRAVENOUS at 21:16

## 2018-03-11 RX ADMIN — DEXTROSE MONOHYDRATE, SODIUM CHLORIDE, AND POTASSIUM CHLORIDE 125 MILLILITER(S): 50; .745; 4.5 INJECTION, SOLUTION INTRAVENOUS at 00:59

## 2018-03-11 RX ADMIN — Medication 1.25 MILLIGRAM(S): at 21:17

## 2018-03-11 NOTE — PROGRESS NOTE ADULT - SUBJECTIVE AND OBJECTIVE BOX
Team B SURGERY PROGRESS NOTE      SUBJECTIVE: Pt seen at examined at bedside. AVSS. No acute events overnight. Still c/o of sever gas pain. Pain well controlled. -Flatus/ -BM. OOB and ambulating. Denies fever, CP, SOB, and NV.         Vital Signs Last 24 Hrs  T(C): 36.8 (11 Mar 2018 03:02), Max: 37.5 (10 Mar 2018 17:51)  T(F): 98.2 (11 Mar 2018 03:02), Max: 99.5 (10 Mar 2018 17:51)  HR: 70 (11 Mar 2018 03:02) (70 - 79)  BP: 147/78 (11 Mar 2018 03:02) (131/69 - 147/86)  BP(mean): --  RR: 20 (11 Mar 2018 03:02) (16 - 20)  SpO2: 98% (11 Mar 2018 03:02) (96% - 100%)    Physical Exam  General: awake, alert, NAD    Pulm: respirations unlabored, no increased WOB  Abdomen: soft, mildly distended, periumbilical TTP  Extremities: Grossly symmetric    I&O's Summary    10 Mar 2018 06:01  -  11 Mar 2018 05:03  --------------------------------------------------------  IN: 2850 mL / OUT: 1025 mL / NET: 1825 mL      I&O's Detail    10 Mar 2018 06:01  -  11 Mar 2018 05:03  --------------------------------------------------------  IN:    dextrose 5% + sodium chloride 0.45% with potassium chloride 20 mEq/L: 1500 mL    IV PiggyBack: 100 mL    lactated ringers.: 1250 mL  Total IN: 2850 mL    OUT:    Nasoenteral Tube: 425 mL    Voided: 600 mL  Total OUT: 1025 mL    Total NET: 1825 mL          MEDICATIONS  (STANDING):  dextrose 5% + sodium chloride 0.45% with potassium chloride 20 mEq/L 1000 milliLiter(s) (125 mL/Hr) IV Continuous <Continuous>  enalaprilat Injectable 1.25 milliGRAM(s) IV Push every 6 hours  heparin  Injectable 5000 Unit(s) SubCutaneous every 8 hours  influenza   Vaccine 0.5 milliLiter(s) IntraMuscular once  levothyroxine Injectable 100 MICROGram(s) IV Push at bedtime    MEDICATIONS  (PRN):      LABS:                        12.5   8.31  )-----------( 287      ( 09 Mar 2018 23:50 )             38.3     03-09    143  |  103  |  19  ----------------------------<  108<H>  3.5   |  26  |  1.35<H>    Ca    9.4      09 Mar 2018 23:50  Phos  3.0     03-09  Mg     2.3     03-09    TPro  8.8<H>  /  Alb  4.2  /  TBili  0.3  /  DBili  x   /  AST  19  /  ALT  13  /  AlkPhos  60  03-09    PT/INR - ( 09 Mar 2018 23:50 )   PT: 11.3 SEC;   INR: 0.98          PTT - ( 09 Mar 2018 23:50 )  PTT:31.7 SEC      RADIOLOGY & ADDITIONAL STUDIES:    3/10 CXR    IMPRESSION:   Enteric tube terminates in the stomach.   No acute pulmonary disease. Team B SURGERY PROGRESS NOTE      SUBJECTIVE: Pt seen at examined at bedside. AVSS. No acute events overnight. Still c/o of sever gas pain. Pain well controlled. +Flatus/ -BM. OOB and ambulating. Denies fever, CP, SOB, and NV.         Vital Signs Last 24 Hrs  T(C): 36.8 (11 Mar 2018 03:02), Max: 37.5 (10 Mar 2018 17:51)  T(F): 98.2 (11 Mar 2018 03:02), Max: 99.5 (10 Mar 2018 17:51)  HR: 70 (11 Mar 2018 03:02) (70 - 79)  BP: 147/78 (11 Mar 2018 03:02) (131/69 - 147/86)  BP(mean): --  RR: 20 (11 Mar 2018 03:02) (16 - 20)  SpO2: 98% (11 Mar 2018 03:02) (96% - 100%)    Physical Exam  General: awake, alert, NAD    Pulm: respirations unlabored, no increased WOB  Abdomen: soft, mildly distended, periumbilical TTP  Extremities: Grossly symmetric    I&O's Summary    10 Mar 2018 06:01  -  11 Mar 2018 05:03  --------------------------------------------------------  IN: 2850 mL / OUT: 1025 mL / NET: 1825 mL      I&O's Detail    10 Mar 2018 06:01  -  11 Mar 2018 05:03  --------------------------------------------------------  IN:    dextrose 5% + sodium chloride 0.45% with potassium chloride 20 mEq/L: 1500 mL    IV PiggyBack: 100 mL    lactated ringers.: 1250 mL  Total IN: 2850 mL    OUT:    Nasoenteral Tube: 425 mL    Voided: 600 mL  Total OUT: 1025 mL    Total NET: 1825 mL          MEDICATIONS  (STANDING):  dextrose 5% + sodium chloride 0.45% with potassium chloride 20 mEq/L 1000 milliLiter(s) (125 mL/Hr) IV Continuous <Continuous>  enalaprilat Injectable 1.25 milliGRAM(s) IV Push every 6 hours  heparin  Injectable 5000 Unit(s) SubCutaneous every 8 hours  influenza   Vaccine 0.5 milliLiter(s) IntraMuscular once  levothyroxine Injectable 100 MICROGram(s) IV Push at bedtime    MEDICATIONS  (PRN):      LABS:                        12.5   8.31  )-----------( 287      ( 09 Mar 2018 23:50 )             38.3     03-09    143  |  103  |  19  ----------------------------<  108<H>  3.5   |  26  |  1.35<H>    Ca    9.4      09 Mar 2018 23:50  Phos  3.0     03-09  Mg     2.3     03-09    TPro  8.8<H>  /  Alb  4.2  /  TBili  0.3  /  DBili  x   /  AST  19  /  ALT  13  /  AlkPhos  60  03-09    PT/INR - ( 09 Mar 2018 23:50 )   PT: 11.3 SEC;   INR: 0.98          PTT - ( 09 Mar 2018 23:50 )  PTT:31.7 SEC      RADIOLOGY & ADDITIONAL STUDIES:    3/10 CXR    IMPRESSION:   Enteric tube terminates in the stomach.   No acute pulmonary disease. Team B SURGERY PROGRESS NOTE    SUBJECTIVE: Pt seen at examined at bedside. AVSS. No acute events overnight. Still c/o of sever gas pain. Pain well controlled. +Flatus/ -BM. OOB and ambulating. Denies fever, CP, SOB, and NV.     Vital Signs Last 24 Hrs  T(C): 36.8 (11 Mar 2018 03:02), Max: 37.5 (10 Mar 2018 17:51)  T(F): 98.2 (11 Mar 2018 03:02), Max: 99.5 (10 Mar 2018 17:51)  HR: 70 (11 Mar 2018 03:02) (70 - 79)  BP: 147/78 (11 Mar 2018 03:02) (131/69 - 147/86)  BP(mean): --  RR: 20 (11 Mar 2018 03:02) (16 - 20)  SpO2: 98% (11 Mar 2018 03:02) (96% - 100%)    Physical Exam  General: awake, alert, NAD    Pulm: respirations unlabored, no increased WOB  Abdomen: soft, mildly distended, periumbilical TTP  Extremities: Grossly symmetric    I&O's Summary    10 Mar 2018 06:01  -  11 Mar 2018 05:03  --------------------------------------------------------  IN: 2850 mL / OUT: 1025 mL / NET: 1825 mL      I&O's Detail    10 Mar 2018 06:01  -  11 Mar 2018 05:03  --------------------------------------------------------  IN:    dextrose 5% + sodium chloride 0.45% with potassium chloride 20 mEq/L: 1500 mL    IV PiggyBack: 100 mL    lactated ringers.: 1250 mL  Total IN: 2850 mL    OUT:    Nasoenteral Tube: 425 mL    Voided: 600 mL  Total OUT: 1025 mL    Total NET: 1825 mL          MEDICATIONS  (STANDING):  dextrose 5% + sodium chloride 0.45% with potassium chloride 20 mEq/L 1000 milliLiter(s) (125 mL/Hr) IV Continuous <Continuous>  enalaprilat Injectable 1.25 milliGRAM(s) IV Push every 6 hours  heparin  Injectable 5000 Unit(s) SubCutaneous every 8 hours  influenza   Vaccine 0.5 milliLiter(s) IntraMuscular once  levothyroxine Injectable 100 MICROGram(s) IV Push at bedtime    MEDICATIONS  (PRN):      LABS:                        12.5   8.31  )-----------( 287      ( 09 Mar 2018 23:50 )             38.3     03-09    143  |  103  |  19  ----------------------------<  108<H>  3.5   |  26  |  1.35<H>    Ca    9.4      09 Mar 2018 23:50  Phos  3.0     03-09  Mg     2.3     03-09    TPro  8.8<H>  /  Alb  4.2  /  TBili  0.3  /  DBili  x   /  AST  19  /  ALT  13  /  AlkPhos  60  03-09    PT/INR - ( 09 Mar 2018 23:50 )   PT: 11.3 SEC;   INR: 0.98          PTT - ( 09 Mar 2018 23:50 )  PTT:31.7 SEC      RADIOLOGY & ADDITIONAL STUDIES:    3/10 CXR    IMPRESSION:   Enteric tube terminates in the stomach.   No acute pulmonary disease.

## 2018-03-11 NOTE — PROGRESS NOTE ADULT - ASSESSMENT
63F with recurrent adhesive SBO    - NPO/NGT/IVF  - monitor GI function  - VTE ppx      [id number] 63F with recurrent adhesive SBO, passed gas this morning    - NPO/NGT/IVF  - monitor GI function  - VTE ppx      [id number]

## 2018-03-12 LAB
APPEARANCE UR: CLEAR — SIGNIFICANT CHANGE UP
BACTERIA # UR AUTO: SIGNIFICANT CHANGE UP
BILIRUB UR-MCNC: NEGATIVE — SIGNIFICANT CHANGE UP
BLOOD UR QL VISUAL: NEGATIVE — SIGNIFICANT CHANGE UP
BUN SERPL-MCNC: 7 MG/DL — SIGNIFICANT CHANGE UP (ref 7–23)
CALCIUM SERPL-MCNC: 8.6 MG/DL — SIGNIFICANT CHANGE UP (ref 8.4–10.5)
CHLORIDE SERPL-SCNC: 103 MMOL/L — SIGNIFICANT CHANGE UP (ref 98–107)
CO2 SERPL-SCNC: 26 MMOL/L — SIGNIFICANT CHANGE UP (ref 22–31)
COLOR SPEC: COLORLESS — SIGNIFICANT CHANGE UP
CREAT SERPL-MCNC: 1.11 MG/DL — SIGNIFICANT CHANGE UP (ref 0.5–1.3)
GLUCOSE SERPL-MCNC: 101 MG/DL — HIGH (ref 70–99)
GLUCOSE UR-MCNC: NEGATIVE — SIGNIFICANT CHANGE UP
HCT VFR BLD CALC: 38.2 % — SIGNIFICANT CHANGE UP (ref 34.5–45)
HGB BLD-MCNC: 12 G/DL — SIGNIFICANT CHANGE UP (ref 11.5–15.5)
HYALINE CASTS # UR AUTO: SIGNIFICANT CHANGE UP (ref 0–?)
KETONES UR-MCNC: NEGATIVE — SIGNIFICANT CHANGE UP
LEUKOCYTE ESTERASE UR-ACNC: HIGH
MAGNESIUM SERPL-MCNC: 2 MG/DL — SIGNIFICANT CHANGE UP (ref 1.6–2.6)
MCHC RBC-ENTMCNC: 29.8 PG — SIGNIFICANT CHANGE UP (ref 27–34)
MCHC RBC-ENTMCNC: 31.4 % — LOW (ref 32–36)
MCV RBC AUTO: 94.8 FL — SIGNIFICANT CHANGE UP (ref 80–100)
MUCOUS THREADS # UR AUTO: SIGNIFICANT CHANGE UP
NITRITE UR-MCNC: POSITIVE — HIGH
NON-SQ EPI CELLS # UR AUTO: <1 — SIGNIFICANT CHANGE UP
NRBC # FLD: 0 — SIGNIFICANT CHANGE UP
PH UR: 7 — SIGNIFICANT CHANGE UP (ref 4.6–8)
PHOSPHATE SERPL-MCNC: 2.6 MG/DL — SIGNIFICANT CHANGE UP (ref 2.5–4.5)
PLATELET # BLD AUTO: 295 K/UL — SIGNIFICANT CHANGE UP (ref 150–400)
PMV BLD: 11.1 FL — SIGNIFICANT CHANGE UP (ref 7–13)
POTASSIUM SERPL-MCNC: 3.7 MMOL/L — SIGNIFICANT CHANGE UP (ref 3.5–5.3)
POTASSIUM SERPL-SCNC: 3.7 MMOL/L — SIGNIFICANT CHANGE UP (ref 3.5–5.3)
PROT UR-MCNC: NEGATIVE MG/DL — SIGNIFICANT CHANGE UP
RBC # BLD: 4.03 M/UL — SIGNIFICANT CHANGE UP (ref 3.8–5.2)
RBC # FLD: 13.9 % — SIGNIFICANT CHANGE UP (ref 10.3–14.5)
RBC CASTS # UR COMP ASSIST: SIGNIFICANT CHANGE UP (ref 0–?)
SODIUM SERPL-SCNC: 142 MMOL/L — SIGNIFICANT CHANGE UP (ref 135–145)
SP GR SPEC: 1.01 — SIGNIFICANT CHANGE UP (ref 1–1.04)
SQUAMOUS # UR AUTO: SIGNIFICANT CHANGE UP
UROBILINOGEN FLD QL: NORMAL MG/DL — SIGNIFICANT CHANGE UP
WBC # BLD: 8.22 K/UL — SIGNIFICANT CHANGE UP (ref 3.8–10.5)
WBC # FLD AUTO: 8.22 K/UL — SIGNIFICANT CHANGE UP (ref 3.8–10.5)
WBC UR QL: SIGNIFICANT CHANGE UP (ref 0–?)

## 2018-03-12 RX ORDER — ONDANSETRON 8 MG/1
4 TABLET, FILM COATED ORAL ONCE
Qty: 0 | Refills: 0 | Status: COMPLETED | OUTPATIENT
Start: 2018-03-12 | End: 2018-03-12

## 2018-03-12 RX ORDER — PHENAZOPYRIDINE HCL 100 MG
100 TABLET ORAL EVERY 8 HOURS
Qty: 0 | Refills: 0 | Status: COMPLETED | OUTPATIENT
Start: 2018-03-12 | End: 2018-03-14

## 2018-03-12 RX ORDER — ACETAMINOPHEN 500 MG
1000 TABLET ORAL ONCE
Qty: 0 | Refills: 0 | Status: COMPLETED | OUTPATIENT
Start: 2018-03-12 | End: 2018-03-12

## 2018-03-12 RX ORDER — MORPHINE SULFATE 50 MG/1
1 CAPSULE, EXTENDED RELEASE ORAL ONCE
Qty: 0 | Refills: 0 | Status: DISCONTINUED | OUTPATIENT
Start: 2018-03-12 | End: 2018-03-12

## 2018-03-12 RX ORDER — CIPROFLOXACIN LACTATE 400MG/40ML
500 VIAL (ML) INTRAVENOUS EVERY 12 HOURS
Qty: 0 | Refills: 0 | Status: DISCONTINUED | OUTPATIENT
Start: 2018-03-12 | End: 2018-03-14

## 2018-03-12 RX ADMIN — Medication 1.25 MILLIGRAM(S): at 06:15

## 2018-03-12 RX ADMIN — Medication 100 MILLIGRAM(S): at 14:38

## 2018-03-12 RX ADMIN — Medication 1.25 MILLIGRAM(S): at 22:55

## 2018-03-12 RX ADMIN — HEPARIN SODIUM 5000 UNIT(S): 5000 INJECTION INTRAVENOUS; SUBCUTANEOUS at 14:38

## 2018-03-12 RX ADMIN — Medication 400 MILLIGRAM(S): at 11:31

## 2018-03-12 RX ADMIN — Medication 100 MILLIGRAM(S): at 22:54

## 2018-03-12 RX ADMIN — MORPHINE SULFATE 1 MILLIGRAM(S): 50 CAPSULE, EXTENDED RELEASE ORAL at 15:21

## 2018-03-12 RX ADMIN — ONDANSETRON 4 MILLIGRAM(S): 8 TABLET, FILM COATED ORAL at 15:22

## 2018-03-12 RX ADMIN — HEPARIN SODIUM 5000 UNIT(S): 5000 INJECTION INTRAVENOUS; SUBCUTANEOUS at 22:54

## 2018-03-12 RX ADMIN — Medication 1.25 MILLIGRAM(S): at 11:32

## 2018-03-12 RX ADMIN — Medication 500 MILLIGRAM(S): at 19:01

## 2018-03-12 RX ADMIN — Medication 1.25 MILLIGRAM(S): at 19:01

## 2018-03-12 RX ADMIN — DEXTROSE MONOHYDRATE, SODIUM CHLORIDE, AND POTASSIUM CHLORIDE 125 MILLILITER(S): 50; .745; 4.5 INJECTION, SOLUTION INTRAVENOUS at 08:52

## 2018-03-12 RX ADMIN — Medication 1000 MILLIGRAM(S): at 20:15

## 2018-03-12 RX ADMIN — MORPHINE SULFATE 1 MILLIGRAM(S): 50 CAPSULE, EXTENDED RELEASE ORAL at 16:00

## 2018-03-12 RX ADMIN — HEPARIN SODIUM 5000 UNIT(S): 5000 INJECTION INTRAVENOUS; SUBCUTANEOUS at 06:16

## 2018-03-12 RX ADMIN — Medication 1000 MILLIGRAM(S): at 00:10

## 2018-03-12 RX ADMIN — Medication 1000 MILLIGRAM(S): at 12:00

## 2018-03-12 RX ADMIN — Medication 100 MICROGRAM(S): at 22:54

## 2018-03-12 RX ADMIN — Medication 400 MILLIGRAM(S): at 19:45

## 2018-03-12 NOTE — PROGRESS NOTE ADULT - ASSESSMENT
63F with recurrent adhesive SBO, + bowel function, complains of dysuria, ua + nitrites     - advance to clears  - monitor GI function  - start cipro for ua follow up culture  - VTE ppx  - D/w B team

## 2018-03-12 NOTE — PROGRESS NOTE ADULT - ATTENDING COMMENTS
Above noted. Developed severe abdominal pain this afternoon. Denies nausea. Had two bowel movements earlier today, passed flatus this morning -nothing thereafter. Received analgesics twice today.  Plan: NPO. Observation.
Above noted. Still has colicky abdominal pain but less often, passing flatus, no bowel movement.  Physical Exam: Afebrile.  Abdomen: Soft, non-tender.  Plan: NPO. Clamp NG tube. Re-connect if nausea develops, or pain intensifies.

## 2018-03-12 NOTE — PROGRESS NOTE ADULT - SUBJECTIVE AND OBJECTIVE BOX
Morning Surgical Progress Note  Patient is a 63y old  Female who presents with a chief complaint of abdominal pain (10 Mar 2018 06:52)    SUBJECTIVE: Patient seen and examined at bedside with surgical team, patient states I think I have a UTI.     Vital Signs Last 24 Hrs  T(C): 36.7 (12 Mar 2018 06:07), Max: 37.9 (11 Mar 2018 20:46)  T(F): 98 (12 Mar 2018 06:07), Max: 100.3 (11 Mar 2018 20:46)  HR: 61 (12 Mar 2018 06:07) (61 - 75)  BP: 142/62 (12 Mar 2018 06:07) (140/66 - 150/72)  RR: 18 (12 Mar 2018 06:07) (17 - 20)  SpO2: 99% (12 Mar 2018 06:07) (95% - 100%)  I&O's Detail    11 Mar 2018 07:01  -  12 Mar 2018 07:00  --------------------------------------------------------  IN:    dextrose 5% + sodium chloride 0.45% with potassium chloride 20 mEq/L: 1500 mL    IV PiggyBack: 350 mL  Total IN: 1850 mL    OUT:    Nasoenteral Tube: 225 mL    Voided: 2850 mL  Total OUT: 3075 mL    Total NET: -1225 mL      12 Mar 2018 07:01  -  12 Mar 2018 10:06  --------------------------------------------------------  IN:    dextrose 5% + sodium chloride 0.45% with potassium chloride 20 mEq/L: 1500 mL    IV PiggyBack: 100 mL  Total IN: 1600 mL    OUT:  Total OUT: 0 mL    Total NET: 1600 mL        MEDICATIONS  (STANDING):  ciprofloxacin     Tablet 500 milliGRAM(s) Oral every 12 hours  dextrose 5% + sodium chloride 0.45% with potassium chloride 20 mEq/L 1000 milliLiter(s) (125 mL/Hr) IV Continuous <Continuous>  enalaprilat Injectable 1.25 milliGRAM(s) IV Push every 6 hours  heparin  Injectable 5000 Unit(s) SubCutaneous every 8 hours  influenza   Vaccine 0.5 milliLiter(s) IntraMuscular once  levothyroxine Injectable 100 MICROGram(s) IV Push at bedtime    MEDICATIONS  (PRN):      Physical Exam  General: A&Ox3, NAD  Abdominal:     LABS:                        12.0   8.22  )-----------( 295      ( 12 Mar 2018 06:00 )             38.2     03-12    142  |  103  |  7   ----------------------------<  101<H>  3.7   |  26  |  1.11    Ca    8.6      12 Mar 2018 06:00  Phos  2.6     03-12  Mg     2.0     03-12          Urinalysis Basic - ( 12 Mar 2018 04:30 )    Color: COLORLESS / Appearance: CLEAR / S.007 / pH: 7.0  Gluc: NEGATIVE / Ketone: NEGATIVE  / Bili: NEGATIVE / Urobili: NORMAL mg/dL   Blood: NEGATIVE / Protein: NEGATIVE mg/dL / Nitrite: POSITIVE   Leuk Esterase: MODERATE / RBC: 0-2 / WBC 10-25   Sq Epi: OCC / Non Sq Epi: x / Bacteria: MOD Morning Surgical Progress Note  Patient is a 63y old  Female who presents with a chief complaint of abdominal pain (10 Mar 2018 06:52)    SUBJECTIVE: Patient seen and examined at bedside with surgical team, patient states I think I have a UTI. + dysuria, passing gas. denies nausea and vomiting    Vital Signs Last 24 Hrs  T(C): 36.7 (12 Mar 2018 06:07), Max: 37.9 (11 Mar 2018 20:46)  T(F): 98 (12 Mar 2018 06:07), Max: 100.3 (11 Mar 2018 20:46)  HR: 61 (12 Mar 2018 06:07) (61 - 75)  BP: 142/62 (12 Mar 2018 06:07) (140/66 - 150/72)  RR: 18 (12 Mar 2018 06:07) (17 - 20)  SpO2: 99% (12 Mar 2018 06:07) (95% - 100%)  I&O's Detail    11 Mar 2018 07:01  -  12 Mar 2018 07:00  --------------------------------------------------------  IN:    dextrose 5% + sodium chloride 0.45% with potassium chloride 20 mEq/L: 1500 mL    IV PiggyBack: 350 mL  Total IN: 1850 mL    OUT:    Nasoenteral Tube: 225 mL    Voided: 2850 mL  Total OUT: 3075 mL    Total NET: -1225 mL      12 Mar 2018 07:01  -  12 Mar 2018 10:06  --------------------------------------------------------  IN:    dextrose 5% + sodium chloride 0.45% with potassium chloride 20 mEq/L: 1500 mL    IV PiggyBack: 100 mL  Total IN: 1600 mL    OUT:  Total OUT: 0 mL    Total NET: 1600 mL        MEDICATIONS  (STANDING):  ciprofloxacin     Tablet 500 milliGRAM(s) Oral every 12 hours  dextrose 5% + sodium chloride 0.45% with potassium chloride 20 mEq/L 1000 milliLiter(s) (125 mL/Hr) IV Continuous <Continuous>  enalaprilat Injectable 1.25 milliGRAM(s) IV Push every 6 hours  heparin  Injectable 5000 Unit(s) SubCutaneous every 8 hours  influenza   Vaccine 0.5 milliLiter(s) IntraMuscular once  levothyroxine Injectable 100 MICROGram(s) IV Push at bedtime    MEDICATIONS  (PRN):      Physical Exam  General: A&Ox3, NAD  Abdominal: soft nontender    LABS:                        12.0   8.22  )-----------( 295      ( 12 Mar 2018 06:00 )             38.2     03-12    142  |  103  |  7   ----------------------------<  101<H>  3.7   |  26  |  1.11    Ca    8.6      12 Mar 2018 06:00  Phos  2.6     03-12  Mg     2.0     03-12          Urinalysis Basic - ( 12 Mar 2018 04:30 )    Color: COLORLESS / Appearance: CLEAR / S.007 / pH: 7.0  Gluc: NEGATIVE / Ketone: NEGATIVE  / Bili: NEGATIVE / Urobili: NORMAL mg/dL   Blood: NEGATIVE / Protein: NEGATIVE mg/dL / Nitrite: POSITIVE   Leuk Esterase: MODERATE / RBC: 0-2 / WBC 10-25   Sq Epi: OCC / Non Sq Epi: x / Bacteria: MOD

## 2018-03-13 ENCOUNTER — TRANSCRIPTION ENCOUNTER (OUTPATIENT)
Age: 63
End: 2018-03-13

## 2018-03-13 LAB
BUN SERPL-MCNC: 6 MG/DL — LOW (ref 7–23)
CALCIUM SERPL-MCNC: 8.4 MG/DL — SIGNIFICANT CHANGE UP (ref 8.4–10.5)
CHLORIDE SERPL-SCNC: 102 MMOL/L — SIGNIFICANT CHANGE UP (ref 98–107)
CO2 SERPL-SCNC: 25 MMOL/L — SIGNIFICANT CHANGE UP (ref 22–31)
CREAT SERPL-MCNC: 1.05 MG/DL — SIGNIFICANT CHANGE UP (ref 0.5–1.3)
GLUCOSE SERPL-MCNC: 117 MG/DL — HIGH (ref 70–99)
HCT VFR BLD CALC: 35.2 % — SIGNIFICANT CHANGE UP (ref 34.5–45)
HGB BLD-MCNC: 11.2 G/DL — LOW (ref 11.5–15.5)
MAGNESIUM SERPL-MCNC: 1.9 MG/DL — SIGNIFICANT CHANGE UP (ref 1.6–2.6)
MCHC RBC-ENTMCNC: 29.7 PG — SIGNIFICANT CHANGE UP (ref 27–34)
MCHC RBC-ENTMCNC: 31.8 % — LOW (ref 32–36)
MCV RBC AUTO: 93.4 FL — SIGNIFICANT CHANGE UP (ref 80–100)
NRBC # FLD: 0 — SIGNIFICANT CHANGE UP
PHOSPHATE SERPL-MCNC: 2.6 MG/DL — SIGNIFICANT CHANGE UP (ref 2.5–4.5)
PLATELET # BLD AUTO: 279 K/UL — SIGNIFICANT CHANGE UP (ref 150–400)
PMV BLD: 10.6 FL — SIGNIFICANT CHANGE UP (ref 7–13)
POTASSIUM SERPL-MCNC: 3.6 MMOL/L — SIGNIFICANT CHANGE UP (ref 3.5–5.3)
POTASSIUM SERPL-SCNC: 3.6 MMOL/L — SIGNIFICANT CHANGE UP (ref 3.5–5.3)
RBC # BLD: 3.77 M/UL — LOW (ref 3.8–5.2)
RBC # FLD: 13.8 % — SIGNIFICANT CHANGE UP (ref 10.3–14.5)
SODIUM SERPL-SCNC: 139 MMOL/L — SIGNIFICANT CHANGE UP (ref 135–145)
SPECIMEN SOURCE: SIGNIFICANT CHANGE UP
WBC # BLD: 7.37 K/UL — SIGNIFICANT CHANGE UP (ref 3.8–10.5)
WBC # FLD AUTO: 7.37 K/UL — SIGNIFICANT CHANGE UP (ref 3.8–10.5)

## 2018-03-13 RX ORDER — POTASSIUM CHLORIDE 20 MEQ
20 PACKET (EA) ORAL ONCE
Qty: 0 | Refills: 0 | Status: COMPLETED | OUTPATIENT
Start: 2018-03-13 | End: 2018-03-13

## 2018-03-13 RX ORDER — LEVOTHYROXINE SODIUM 125 MCG
200 TABLET ORAL DAILY
Qty: 0 | Refills: 0 | Status: DISCONTINUED | OUTPATIENT
Start: 2018-03-13 | End: 2018-03-14

## 2018-03-13 RX ADMIN — Medication 100 MILLIGRAM(S): at 21:09

## 2018-03-13 RX ADMIN — Medication 500 MILLIGRAM(S): at 05:46

## 2018-03-13 RX ADMIN — Medication 1.25 MILLIGRAM(S): at 05:46

## 2018-03-13 RX ADMIN — HEPARIN SODIUM 5000 UNIT(S): 5000 INJECTION INTRAVENOUS; SUBCUTANEOUS at 05:46

## 2018-03-13 RX ADMIN — Medication 100 MILLIGRAM(S): at 05:46

## 2018-03-13 RX ADMIN — HEPARIN SODIUM 5000 UNIT(S): 5000 INJECTION INTRAVENOUS; SUBCUTANEOUS at 21:09

## 2018-03-13 RX ADMIN — DEXTROSE MONOHYDRATE, SODIUM CHLORIDE, AND POTASSIUM CHLORIDE 125 MILLILITER(S): 50; .745; 4.5 INJECTION, SOLUTION INTRAVENOUS at 10:06

## 2018-03-13 RX ADMIN — Medication 20 MILLIEQUIVALENT(S): at 10:05

## 2018-03-13 RX ADMIN — Medication 1.25 MILLIGRAM(S): at 12:26

## 2018-03-13 RX ADMIN — Medication 100 MILLIGRAM(S): at 15:00

## 2018-03-13 RX ADMIN — Medication 2.5 MILLIGRAM(S): at 20:39

## 2018-03-13 RX ADMIN — Medication 500 MILLIGRAM(S): at 19:09

## 2018-03-13 RX ADMIN — HEPARIN SODIUM 5000 UNIT(S): 5000 INJECTION INTRAVENOUS; SUBCUTANEOUS at 15:00

## 2018-03-13 NOTE — DISCHARGE NOTE ADULT - CARE PROVIDER_API CALL
Abel Mcdonough), Surgery  [address] [facility]  [address] [place] [id number]  Phone: [phone]  Fax: [phone]

## 2018-03-13 NOTE — DISCHARGE NOTE ADULT - PATIENT PORTAL LINK FT
You can access the [facility] Patient Portal, offered by [facility], by registering with the following website: [email or web address]

## 2018-03-13 NOTE — DISCHARGE NOTE ADULT - MEDICATION SUMMARY - MEDICATIONS TO TAKE
I will START or STAY ON the medications listed below when I get home from the hospital:    verapamil 240 mg/24 hours oral capsule, extended release  -- 1 cap(s) by mouth every other day  -- Indication: For Antiarrhythmic    verapamil 360 mg/24 hours oral capsule, extended release  -- 1 cap(s) by mouth every other day  -- Indication: For Antiarrhythmic    hydrochlorothiazide-valsartan 25 mg-320 mg oral tablet  -- 1 tab(s) by mouth once a day  -- Indication: For HTN    ciprofloxacin 500 mg oral tablet  -- 1 tab(s) by mouth every 12 hours MDD:2  -- Avoid prolonged or excessive exposure to direct and/or artificial sunlight while taking this medication.  Check with your doctor before becoming pregnant.  Do not take dairy products, antacids, or iron preparations within one hour of this medication.  Finish all this medication unless otherwise directed by prescriber.  Medication should be taken with plenty of water.    -- Indication: For ANtibiotic    levothyroxine 200 mcg (0.2 mg) oral tablet  -- 1 tab(s) by mouth once a day  -- Indication: For Hypothyroid

## 2018-03-13 NOTE — DISCHARGE NOTE ADULT - PLAN OF CARE
Resolution of bowel obstruction DIET: Return to your usual diet.  NOTIFY YOUR SURGEON IF: You have persistent nausea/vomiting, or if your pain is not controlled on your discharge pain medications, unable to urinate.  Please follow up with your primary care physician in one week regarding your hospitalization, bring copies of your discharge paperwork.  Please follow up with your surgeon, Dr. Mcdonough as needed

## 2018-03-13 NOTE — PROGRESS NOTE ADULT - SUBJECTIVE AND OBJECTIVE BOX
Morning Surgical Progress Note  Patient is a 63y old  Female who presents with a chief complaint of abdominal pain (10 Mar 2018 06:52)    SUBJECTIVE: Patient seen and examined at bedside with surgical team, patient without complaints. States her pain resolved, she is passing gas and had 2 bm's.  No nausea    Vital Signs Last 24 Hrs  T(C): 37 (13 Mar 2018 10:15), Max: 37.1 (13 Mar 2018 05:45)  T(F): 98.6 (13 Mar 2018 10:15), Max: 98.7 (13 Mar 2018 05:45)  HR: 596 (13 Mar 2018 10:15) (56 - 596)  BP: 148/78 (13 Mar 2018 10:15) (133/60 - 151/79)  RR: 18 (13 Mar 2018 10:15) (16 - 18)  SpO2: 98% (13 Mar 2018 10:15) (95% - 100%)      Physical Exam  General: A&Ox3, NAD  Abdominal: obese, soft nontender    LABS:                        11.2   7.37  )-----------( 279      ( 13 Mar 2018 05:40 )             35.2     03-13    139  |  102  |  6<L>  ----------------------------<  117<H>  3.6   |  25  |  1.05    Ca    8.4      13 Mar 2018 05:40  Phos  2.6     03-13  Mg     1.9     03-13          Urinalysis Basic - ( 12 Mar 2018 04:30 )    Color: COLORLESS / Appearance: CLEAR / S.007 / pH: 7.0  Gluc: NEGATIVE / Ketone: NEGATIVE  / Bili: NEGATIVE / Urobili: NORMAL mg/dL   Blood: NEGATIVE / Protein: NEGATIVE mg/dL / Nitrite: POSITIVE   Leuk Esterase: MODERATE / RBC: 0-2 / WBC 10-25   Sq Epi: OCC / Non Sq Epi: x / Bacteria: MOD

## 2018-03-13 NOTE — DISCHARGE NOTE ADULT - HOSPITAL COURSE
63F with h/o multiple prior adhesive SBOs (sx h/o laparoscopic left partial nephrectomy, KANDY, and dx laparoscopy), all managed non-operatively - presents to [facility] on 3/10 with 24 hours of progressively worsening abdominal pain and nausea. She also reports chills at home. No emesis.   CT Scan: Redemonstration of small bowel obstruction with similar transition point in the right hemipelvis and continued fecalization of distal jeujunal/proximal ileum loop  She was admitted to Dr JOSEPH Mcdonough, she was made NPO and had an NGT inserted. During hospital course patients diet was slowly advanced as tolerated. She was diagnosed with a UTI and started on cipro on 3/12, her UA grew Gram - Rods.   At this time, pt is tolerating a regular diet, ambulating and voiding without issues.  Pt has been deemed stable for discharge at this time.

## 2018-03-13 NOTE — PROGRESS NOTE ADULT - ASSESSMENT
63F with recurrent adhesive SBO, + bowel function, complains of dysuria, ua + nitrites, had two bowel movements this am, patient believes she is ready for hot tea.    - Keep NPO except Hot tea  - monitor GI function  - Continue cipro for ua, follow up culture  - VTE ppx  - OOB  - D/w B team

## 2018-03-13 NOTE — DISCHARGE NOTE ADULT - CARE PLAN
Principal Discharge DX:	Small bowel obstruction  Goal:	Resolution of bowel obstruction  Assessment and plan of treatment:	DIET: Return to your usual diet.  NOTIFY YOUR SURGEON IF: You have persistent nausea/vomiting, or if your pain is not controlled on your discharge pain medications, unable to urinate.  Please follow up with your primary care physician in one week regarding your hospitalization, bring copies of your discharge paperwork.  Please follow up with your surgeon, Dr. Mcdonough as needed

## 2018-03-14 VITALS
TEMPERATURE: 99 F | RESPIRATION RATE: 20 BRPM | DIASTOLIC BLOOD PRESSURE: 73 MMHG | OXYGEN SATURATION: 100 % | HEART RATE: 87 BPM | SYSTOLIC BLOOD PRESSURE: 137 MMHG

## 2018-03-14 LAB
-  AMIKACIN: SIGNIFICANT CHANGE UP
-  AMPICILLIN/SULBACTAM: SIGNIFICANT CHANGE UP
-  AMPICILLIN: SIGNIFICANT CHANGE UP
-  AZTREONAM: SIGNIFICANT CHANGE UP
-  CEFAZOLIN: SIGNIFICANT CHANGE UP
-  CEFEPIME: SIGNIFICANT CHANGE UP
-  CEFOXITIN: SIGNIFICANT CHANGE UP
-  CEFTAZIDIME: SIGNIFICANT CHANGE UP
-  CEFTRIAXONE: SIGNIFICANT CHANGE UP
-  CIPROFLOXACIN: SIGNIFICANT CHANGE UP
-  ERTAPENEM: SIGNIFICANT CHANGE UP
-  GENTAMICIN: SIGNIFICANT CHANGE UP
-  IMIPENEM: SIGNIFICANT CHANGE UP
-  LEVOFLOXACIN: SIGNIFICANT CHANGE UP
-  MEROPENEM: SIGNIFICANT CHANGE UP
-  NITROFURANTOIN: SIGNIFICANT CHANGE UP
-  PIPERACILLIN/TAZOBACTAM: SIGNIFICANT CHANGE UP
-  TIGECYCLINE: SIGNIFICANT CHANGE UP
-  TOBRAMYCIN: SIGNIFICANT CHANGE UP
-  TRIMETHOPRIM/SULFAMETHOXAZOLE: SIGNIFICANT CHANGE UP
BACTERIA UR CULT: SIGNIFICANT CHANGE UP
BUN SERPL-MCNC: 5 MG/DL — LOW (ref 7–23)
CALCIUM SERPL-MCNC: 8.8 MG/DL — SIGNIFICANT CHANGE UP (ref 8.4–10.5)
CHLORIDE SERPL-SCNC: 104 MMOL/L — SIGNIFICANT CHANGE UP (ref 98–107)
CO2 SERPL-SCNC: 23 MMOL/L — SIGNIFICANT CHANGE UP (ref 22–31)
CREAT SERPL-MCNC: 1.13 MG/DL — SIGNIFICANT CHANGE UP (ref 0.5–1.3)
GLUCOSE SERPL-MCNC: 76 MG/DL — SIGNIFICANT CHANGE UP (ref 70–99)
HCT VFR BLD CALC: 35.9 % — SIGNIFICANT CHANGE UP (ref 34.5–45)
HGB BLD-MCNC: 11 G/DL — LOW (ref 11.5–15.5)
MAGNESIUM SERPL-MCNC: 1.7 MG/DL — SIGNIFICANT CHANGE UP (ref 1.6–2.6)
MCHC RBC-ENTMCNC: 29.5 PG — SIGNIFICANT CHANGE UP (ref 27–34)
MCHC RBC-ENTMCNC: 30.6 % — LOW (ref 32–36)
MCV RBC AUTO: 96.2 FL — SIGNIFICANT CHANGE UP (ref 80–100)
METHOD TYPE: SIGNIFICANT CHANGE UP
NRBC # FLD: 0 — SIGNIFICANT CHANGE UP
ORGANISM # SPEC MICROSCOPIC CNT: SIGNIFICANT CHANGE UP
ORGANISM # SPEC MICROSCOPIC CNT: SIGNIFICANT CHANGE UP
PHOSPHATE SERPL-MCNC: 2.6 MG/DL — SIGNIFICANT CHANGE UP (ref 2.5–4.5)
PLATELET # BLD AUTO: 239 K/UL — SIGNIFICANT CHANGE UP (ref 150–400)
PMV BLD: 12.3 FL — SIGNIFICANT CHANGE UP (ref 7–13)
POTASSIUM SERPL-MCNC: 3.8 MMOL/L — SIGNIFICANT CHANGE UP (ref 3.5–5.3)
POTASSIUM SERPL-SCNC: 3.8 MMOL/L — SIGNIFICANT CHANGE UP (ref 3.5–5.3)
RBC # BLD: 3.73 M/UL — LOW (ref 3.8–5.2)
RBC # FLD: 13.8 % — SIGNIFICANT CHANGE UP (ref 10.3–14.5)
SODIUM SERPL-SCNC: 140 MMOL/L — SIGNIFICANT CHANGE UP (ref 135–145)
WBC # BLD: 9.67 K/UL — SIGNIFICANT CHANGE UP (ref 3.8–10.5)
WBC # FLD AUTO: 9.67 K/UL — SIGNIFICANT CHANGE UP (ref 3.8–10.5)

## 2018-03-14 RX ORDER — MAGNESIUM SULFATE 500 MG/ML
2 VIAL (ML) INJECTION ONCE
Qty: 0 | Refills: 0 | Status: COMPLETED | OUTPATIENT
Start: 2018-03-14 | End: 2018-03-14

## 2018-03-14 RX ORDER — CIPROFLOXACIN LACTATE 400MG/40ML
1 VIAL (ML) INTRAVENOUS
Qty: 3 | Refills: 0 | OUTPATIENT
Start: 2018-03-14 | End: 2018-03-14

## 2018-03-14 RX ORDER — CIPROFLOXACIN LACTATE 400MG/40ML
1 VIAL (ML) INTRAVENOUS
Qty: 3 | Refills: 0
Start: 2018-03-14 | End: 2018-03-14

## 2018-03-14 RX ADMIN — Medication 2.5 MILLIGRAM(S): at 05:02

## 2018-03-14 RX ADMIN — Medication 200 MICROGRAM(S): at 05:02

## 2018-03-14 RX ADMIN — Medication 100 MILLIGRAM(S): at 05:02

## 2018-03-14 RX ADMIN — Medication 500 MILLIGRAM(S): at 05:02

## 2018-03-14 RX ADMIN — Medication 50 GRAM(S): at 11:46

## 2018-03-14 RX ADMIN — HEPARIN SODIUM 5000 UNIT(S): 5000 INJECTION INTRAVENOUS; SUBCUTANEOUS at 05:04

## 2018-03-14 NOTE — PROVIDER CONTACT NOTE (OTHER) - ASSESSMENT
pt refusing IVF overnight saying IV site burns. New IV was placed by clinical impact earlier this shift. Positive blood return and flushing without resistance. MD made aware. No action at this time.
AOX4, previous PIV placed with ultrasound

## 2018-03-14 NOTE — PROGRESS NOTE ADULT - ASSESSMENT
63F with recurrent adhesive SBO, + bowel function, pain resolved. Tolerating diet.       - Advance to regular diet   - monitor GI function  - Continue cipro for ua  - VTE ppx  - OOB  - D/c to home today if tolerating reg diet

## 2018-03-14 NOTE — PROGRESS NOTE ADULT - SUBJECTIVE AND OBJECTIVE BOX
Morning Surgical Progress Note  Patient is a 63y old  Female who presents with a chief complaint of abdominal pain (10 Mar 2018 06:52)    SUBJECTIVE: Patient seen and examined at bedside with surgical team, patient without complaints. States her pain resolved.  No nausea    Vital Signs Last 24 Hrs  T(C): 36.4 (14 Mar 2018 05:00), Max: 37.6 (13 Mar 2018 17:34)  T(F): 97.5 (14 Mar 2018 05:00), Max: 99.7 (13 Mar 2018 17:34)  HR: 60 (14 Mar 2018 05:00) (57 - 75)  BP: 141/79 (14 Mar 2018 05:00) (122/66 - 156/92)  BP(mean): --  RR: 17 (14 Mar 2018 05:00) (17 - 20)  SpO2: 100% (14 Mar 2018 05:00) (96% - 100%)      Physical Exam  General: A&Ox3, NAD  Abdominal: obese, soft nontender    LABS:                               11.0   9.67  )-----------( 239      ( 14 Mar 2018 05:47 )             35.9     03-14    140  |  104  |  5<L>  ----------------------------<  76  3.8   |  23  |  1.13    Ca    8.8      14 Mar 2018 05:47  Phos  2.6     03-14  Mg     1.7     03-14

## 2018-03-23 ENCOUNTER — APPOINTMENT (OUTPATIENT)
Dept: INTERNAL MEDICINE | Facility: CLINIC | Age: 63
End: 2018-03-23
Payer: COMMERCIAL

## 2018-03-23 VITALS
OXYGEN SATURATION: 97 % | BODY MASS INDEX: 40.34 KG/M2 | HEIGHT: 67 IN | HEART RATE: 72 BPM | SYSTOLIC BLOOD PRESSURE: 140 MMHG | DIASTOLIC BLOOD PRESSURE: 82 MMHG | WEIGHT: 257 LBS

## 2018-03-23 PROCEDURE — 99213 OFFICE O/P EST LOW 20 MIN: CPT

## 2018-03-23 RX ORDER — MELOXICAM 15 MG/1
15 TABLET ORAL
Qty: 30 | Refills: 1 | Status: DISCONTINUED | COMMUNITY
Start: 2017-09-14 | End: 2018-03-23

## 2018-03-26 ENCOUNTER — APPOINTMENT (OUTPATIENT)
Dept: INTERNAL MEDICINE | Facility: CLINIC | Age: 63
End: 2018-03-26

## 2018-05-17 ENCOUNTER — APPOINTMENT (OUTPATIENT)
Dept: INTERNAL MEDICINE | Facility: CLINIC | Age: 63
End: 2018-05-17
Payer: COMMERCIAL

## 2018-05-17 VITALS
DIASTOLIC BLOOD PRESSURE: 90 MMHG | BODY MASS INDEX: 40.49 KG/M2 | SYSTOLIC BLOOD PRESSURE: 158 MMHG | HEART RATE: 67 BPM | WEIGHT: 258 LBS | OXYGEN SATURATION: 96 % | HEIGHT: 67 IN

## 2018-05-17 DIAGNOSIS — J32.9 CHRONIC SINUSITIS, UNSPECIFIED: ICD-10-CM

## 2018-05-17 PROCEDURE — 99214 OFFICE O/P EST MOD 30 MIN: CPT

## 2018-05-17 RX ORDER — LEVOFLOXACIN 500 MG/1
500 TABLET, FILM COATED ORAL DAILY
Qty: 21 | Refills: 0 | Status: COMPLETED | COMMUNITY
Start: 2018-05-17 | End: 2018-06-07

## 2018-05-17 NOTE — REVIEW OF SYSTEMS
[Fatigue] : fatigue [Vision Problems] : vision problems [Cough] : cough [Negative] : Cardiovascular [FreeTextEntry3] : eye is closed in the morning [FreeTextEntry4] : see HPI

## 2018-05-17 NOTE — ASSESSMENT
[FreeTextEntry1] : \par 1.  Complains of cough x2 months treatment by a combination of postnasal drip and probable chronic sinusitis. Patient has had multiple NG tube placed into nose and a history of polyps with probable obstruction. She complains of frontal sinus pain. We'll opt to treat with a prolonged course of antibiotics and a Medrol Dosepak to decrease the inflammation. 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  Discussed with patient the risk for C. difficile on prolonged fluoroquinolin along with risk of tendinopathy.\par 2. Early records provided from 1997 regarding diagnosis of Graves' disease.\par 3. Return to office pending above.

## 2018-05-17 NOTE — HISTORY OF PRESENT ILLNESS
[FreeTextEntry8] : 2 months of cough, sinus congestion and post nasal drip.  This happens every time she has an NGT placed. It is intermittent throughout the day.  Yesterday she was wheezing.  Nasal sprays work temporarily.  The cough is moderate to severe. Also, needs documentation of diagnosis of Graves because now she is having ptosis on the left and the oculoplastics physician needs the original documentation.

## 2018-05-17 NOTE — PHYSICAL EXAM
[No Acute Distress] : no acute distress [Well Nourished] : well nourished [Well Developed] : well developed [Well-Appearing] : well-appearing [Normal Sclera/Conjunctiva] : normal sclera/conjunctiva [PERRL] : pupils equal round and reactive to light [Supple] : supple [No Respiratory Distress] : no respiratory distress  [Clear to Auscultation] : lungs were clear to auscultation bilaterally [Normal Rate] : normal rate  [Regular Rhythm] : with a regular rhythm [Normal S1, S2] : normal S1 and S2 [No Rash] : no rash [de-identified] : wet cough noted

## 2018-06-08 ENCOUNTER — APPOINTMENT (OUTPATIENT)
Dept: INTERNAL MEDICINE | Facility: CLINIC | Age: 63
End: 2018-06-08

## 2018-06-16 NOTE — ED ADULT NURSE NOTE - NSSISCREENINGQ1_ED_A_ED
Presents to ER with left low back pain since last pm.  Denies falls or injury.  Patient's name and date of birth checked and is correct.  LOC: The patient is awake, alert and aware of environment with an appropriate affect, the patient is oriented x 3 and speaking appropriately.  APPEARANCE: Patient resting comfortably and in no acute distress, patient is clean and well groomed, patient's clothing is properly fastened.  CARDIOVASCULAR:  Heart rate regular and even with no peripheral edema noted.  SKIN: The skin is warm and dry, patient has normal skin turgor and moist mucus membranes, skin intact, no breakdown or brusing noted.   MUSKULOSKELETAL: Patient moving all extremities well, no obvious swelling or deformities noted.  RESPIRATORY: Airway is open and patent, respirations are spontaneous, patient has a normal effort and rate.   
No

## 2018-07-10 ENCOUNTER — APPOINTMENT (OUTPATIENT)
Dept: INTERNAL MEDICINE | Facility: CLINIC | Age: 63
End: 2018-07-10
Payer: COMMERCIAL

## 2018-07-10 ENCOUNTER — NON-APPOINTMENT (OUTPATIENT)
Age: 63
End: 2018-07-10

## 2018-07-10 VITALS
SYSTOLIC BLOOD PRESSURE: 148 MMHG | DIASTOLIC BLOOD PRESSURE: 78 MMHG | HEIGHT: 65 IN | BODY MASS INDEX: 43.99 KG/M2 | WEIGHT: 264 LBS

## 2018-07-10 DIAGNOSIS — M17.10 UNILATERAL PRIMARY OSTEOARTHRITIS, UNSPECIFIED KNEE: ICD-10-CM

## 2018-07-10 DIAGNOSIS — M54.16 RADICULOPATHY, LUMBAR REGION: ICD-10-CM

## 2018-07-10 DIAGNOSIS — M54.30 SCIATICA, UNSPECIFIED SIDE: ICD-10-CM

## 2018-07-10 DIAGNOSIS — Z87.440 PERSONAL HISTORY OF URINARY (TRACT) INFECTIONS: ICD-10-CM

## 2018-07-10 PROCEDURE — 93000 ELECTROCARDIOGRAM COMPLETE: CPT

## 2018-07-10 PROCEDURE — 99396 PREV VISIT EST AGE 40-64: CPT | Mod: 25

## 2018-07-10 RX ORDER — METHYLPREDNISOLONE 4 MG/1
4 TABLET ORAL
Qty: 1 | Refills: 0 | Status: DISCONTINUED | COMMUNITY
Start: 2018-05-17 | End: 2018-07-10

## 2018-07-10 NOTE — COUNSELING
[Weight management counseling provided] : Weight management [Healthy eating counseling provided] : healthy eating [Activity counseling provided] : activity [Low Salt Diet] : Low salt diet [Walking] : Walking [Good understanding] : Patient has a good understanding of lifestyle changes and the steps needed to achieve self management goals

## 2018-07-10 NOTE — PHYSICAL EXAM

## 2018-07-11 LAB
APPEARANCE: CLEAR
BACTERIA: NEGATIVE
BILIRUBIN URINE: NEGATIVE
BLOOD URINE: NEGATIVE
COLOR: YELLOW
CORE LAB FLUID CYTOLOGY: NORMAL
GLUCOSE QUALITATIVE U: NEGATIVE MG/DL
HYALINE CASTS: 2 /LPF
KETONES URINE: NEGATIVE
LEUKOCYTE ESTERASE URINE: ABNORMAL
MICROSCOPIC-UA: NORMAL
NITRITE URINE: NEGATIVE
PH URINE: 5.5
PROTEIN URINE: NEGATIVE MG/DL
RED BLOOD CELLS URINE: 1 /HPF
SPECIFIC GRAVITY URINE: 1.01
SQUAMOUS EPITHELIAL CELLS: 5 /HPF
T4 FREE SERPL-MCNC: 1.5 NG/DL
TSH SERPL-ACNC: 2.74 UIU/ML
UROBILINOGEN URINE: NEGATIVE MG/DL
WHITE BLOOD CELLS URINE: 11 /HPF

## 2018-07-21 PROBLEM — Z87.440 HISTORY OF URINARY TRACT INFECTION: Status: RESOLVED | Noted: 2017-06-23 | Resolved: 2018-07-21

## 2018-07-21 PROBLEM — M54.16 LUMBAR RADICULOPATHY: Status: ACTIVE | Noted: 2017-09-14

## 2018-07-21 NOTE — ASSESSMENT
[FreeTextEntry1] : \par 1.   REBECCA - is UTD but should be going for a colonoscopy again as she had some polyps 5 years ago.  She declines at this time.  Next year will need prevnar.  Consider Shingrix when available.\par 2.  HTN - BP is mildly elevated but this is good for Pam and when she is home is likely lower.\par 3.  Hypothyroidism - clinically is euthyroid.  Last TSH was mildly elevated and with her c/o weight gain need to check TFT's today\par 4.  Back pain/sciatica - OK to take nsaid's or asa for a few days.  Discussed pool exercises.\par 5.  Increased weight - to work on weight loss\par 6.  Cough - has now had one for 3 months.  Will check a CXR PA/lat\par 7.  H/O RCC - check urine cytology\par 8.  F/u 6 mos or prn

## 2018-07-21 NOTE — HISTORY OF PRESENT ILLNESS
[de-identified] : Pam is a 63-year-old black female with a history of Graves' disease status post ablation now hypothyroid, morbid obesity, hypertension, renal cell carcinoma with mild renal insufficiency here for a comprehensive evaluation.\par \par Yesterday had some sciatica on the right radiating around the hip and all the way down to her ankle while at  work yesterday.  Took 2 gabapentin which did not do anything.  Today it is a little better.  Denies any trauma.  She did not want to take any nsaid's.\par \par In terms of her RCC she is seeing her urologist once a year at this point.  No c/o hematuria.  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

## 2018-07-21 NOTE — HEALTH RISK ASSESSMENT
[Patient reported mammogram was normal] : Patient reported mammogram was normal [Patient reported colonoscopy was abnormal] : Patient reported colonoscopy was abnormal [None] : None [Alone] : lives alone [Employed] : employed [Feels Safe at Home] : Feels safe at home [Fully functional (bathing, dressing, toileting, transferring, walking, feeding)] : Fully functional (bathing, dressing, toileting, transferring, walking, feeding) [Fully functional (using the telephone, shopping, preparing meals, housekeeping, doing laundry, using] : Fully functional and needs no help or supervision to perform IADLs (using the telephone, shopping, preparing meals, housekeeping, doing laundry, using transportation, managing medications and managing finances) [Smoke Detector] : smoke detector [Carbon Monoxide Detector] : carbon monoxide detector [Seat Belt] :  uses seat belt [Sunscreen] : uses sunscreen [Good] : ~his/her~  mood as  good [No falls in past year] : Patient reported no falls in the past year [] : No [Change in mental status noted] : No change in mental status noted [Language] : denies difficulty with language [Behavior] : denies difficulty with behavior [Learning/Retaining New Information] : denies difficulty learning/retaining new information [Handling Complex Tasks] : denies difficulty handling complex tasks [Reasoning] : denies difficulty with reasoning [Spatial Ability and Orientation] : denies difficulty with spatial ability and orientation [Reports changes in hearing] : Reports no changes in hearing [Reports changes in vision] : Reports no changes in vision [Reports changes in dental health] : Reports no changes in dental health [MammogramDate] : 03/18 [BoneDensityDate] : 03/18 [ColonoscopyDate] : 01/13 [ColonoscopyComments] : Had polyps but refuses to go for any more

## 2018-07-24 ENCOUNTER — RX RENEWAL (OUTPATIENT)
Age: 63
End: 2018-07-24

## 2018-08-15 ENCOUNTER — APPOINTMENT (OUTPATIENT)
Dept: INTERNAL MEDICINE | Facility: CLINIC | Age: 63
End: 2018-08-15
Payer: COMMERCIAL

## 2018-08-15 VITALS
BODY MASS INDEX: 43.43 KG/M2 | DIASTOLIC BLOOD PRESSURE: 90 MMHG | OXYGEN SATURATION: 96 % | SYSTOLIC BLOOD PRESSURE: 140 MMHG | WEIGHT: 261 LBS | HEART RATE: 75 BPM

## 2018-08-15 DIAGNOSIS — H02.539 EYELID RETRACTION UNSPECIFIED EYE, UNSPECIFIED LID: ICD-10-CM

## 2018-08-15 PROCEDURE — 99215 OFFICE O/P EST HI 40 MIN: CPT

## 2018-08-15 RX ORDER — CANDESARTAN CILEXETIL AND HYDROCHLOROTHIAZIDE 32; 25 MG/1; MG/1
32-25 TABLET ORAL DAILY
Qty: 90 | Refills: 3 | Status: DISCONTINUED | COMMUNITY
Start: 2018-07-24 | End: 2018-08-15

## 2018-08-16 ENCOUNTER — FORM ENCOUNTER (OUTPATIENT)
Age: 63
End: 2018-08-16

## 2018-08-17 ENCOUNTER — APPOINTMENT (OUTPATIENT)
Dept: ULTRASOUND IMAGING | Facility: HOSPITAL | Age: 63
End: 2018-08-17

## 2018-08-17 ENCOUNTER — OUTPATIENT (OUTPATIENT)
Dept: OUTPATIENT SERVICES | Facility: HOSPITAL | Age: 63
LOS: 1 days | End: 2018-08-17
Payer: COMMERCIAL

## 2018-08-17 DIAGNOSIS — I73.9 PERIPHERAL VASCULAR DISEASE, UNSPECIFIED: ICD-10-CM

## 2018-08-17 DIAGNOSIS — H02.539 EYELID RETRACTION UNSPECIFIED EYE, UNSPECIFIED LID: ICD-10-CM

## 2018-08-17 LAB
ALBUMIN SERPL ELPH-MCNC: 3.9 G/DL
ALP BLD-CCNC: 58 U/L
ALT SERPL-CCNC: 15 U/L
ANION GAP SERPL CALC-SCNC: 14 MMOL/L
APTT BLD: 31.8 SEC
AST SERPL-CCNC: 21 U/L
BASOPHILS # BLD AUTO: 0.01 K/UL
BASOPHILS NFR BLD AUTO: 0.1 %
BILIRUB SERPL-MCNC: 0.2 MG/DL
BUN SERPL-MCNC: 21 MG/DL
CALCIUM SERPL-MCNC: 10.1 MG/DL
CHLORIDE SERPL-SCNC: 101 MMOL/L
CO2 SERPL-SCNC: 24 MMOL/L
CREAT SERPL-MCNC: 1.36 MG/DL
EOSINOPHIL # BLD AUTO: 0.17 K/UL
EOSINOPHIL NFR BLD AUTO: 2.1 %
GLUCOSE SERPL-MCNC: 85 MG/DL
HCT VFR BLD CALC: 38.7 %
HGB BLD-MCNC: 12.4 G/DL
IMM GRANULOCYTES NFR BLD AUTO: 0.1 %
INR PPP: 0.96 RATIO
LYMPHOCYTES # BLD AUTO: 2.03 K/UL
LYMPHOCYTES NFR BLD AUTO: 25 %
MAN DIFF?: NORMAL
MCHC RBC-ENTMCNC: 29.9 PG
MCHC RBC-ENTMCNC: 32 GM/DL
MCV RBC AUTO: 93.3 FL
MONOCYTES # BLD AUTO: 0.5 K/UL
MONOCYTES NFR BLD AUTO: 6.2 %
NEUTROPHILS # BLD AUTO: 5.4 K/UL
NEUTROPHILS NFR BLD AUTO: 66.5 %
PLATELET # BLD AUTO: 343 K/UL
POTASSIUM SERPL-SCNC: 3.7 MMOL/L
PROT SERPL-MCNC: 8.7 G/DL
PT BLD: 10.8 SEC
RBC # BLD: 4.15 M/UL
RBC # FLD: 14.9 %
SODIUM SERPL-SCNC: 140 MMOL/L
WBC # FLD AUTO: 8.12 K/UL

## 2018-08-17 PROCEDURE — 93923 UPR/LXTR ART STDY 3+ LVLS: CPT | Mod: 26

## 2018-08-17 PROCEDURE — 93923 UPR/LXTR ART STDY 3+ LVLS: CPT

## 2018-08-17 NOTE — REVIEW OF SYSTEMS
[Leg Claudication] : leg claudication [Fever] : no fever [Pain] : no pain [Redness] : no redness [Sore Throat] : no sore throat [Chest Pain] : no chest pain [Palpitations] : no palpitations [Lower Ext Edema] : no lower extremity edema [Orthopnea] : no orthopnea [Paroysmal Nocturnal Dyspnea] : no paroysmal nocturnal dyspnea [Dyspnea on Exertion] : no dyspnea on exertion [Abdominal Pain] : no abdominal pain [Diarrhea] : diarrhea [Dysuria] : no dysuria [Joint Swelling] : no joint swelling [Skin Rash] : no skin rash [Dizziness] : no dizziness [Fainting] : no fainting [Easy Bleeding] : no easy bleeding [Easy Bruising] : no easy bruising

## 2018-08-17 NOTE — PHYSICAL EXAM
[No Acute Distress] : no acute distress [Well-Appearing] : well-appearing [Normal Sclera/Conjunctiva] : normal sclera/conjunctiva [Normal Oropharynx] : the oropharynx was normal [Supple] : supple [No Respiratory Distress] : no respiratory distress  [Clear to Auscultation] : lungs were clear to auscultation bilaterally [No Accessory Muscle Use] : no accessory muscle use [Normal Rate] : normal rate  [Regular Rhythm] : with a regular rhythm [Normal S1, S2] : normal S1 and S2 [No Edema] : there was no peripheral edema [Soft] : abdomen soft [Non Tender] : non-tender [Normal Bowel Sounds] : normal bowel sounds [Normal Supraclavicular Nodes] : no supraclavicular lymphadenopathy [Normal Anterior Cervical Nodes] : no anterior cervical lymphadenopathy [No Focal Deficits] : no focal deficits [Speech Grossly Normal] : speech grossly normal [Normal Affect] : the affect was normal [Alert and Oriented x3] : oriented to person, place, and time [Normal Mood] : the mood was normal [Normal Insight/Judgement] : insight and judgment were intact [Acne] : no acne [de-identified] : No calf tenderness bilaterally. Radial pulses +2 bilaterally , DP pulses +2 bilaterally

## 2018-08-17 NOTE — ADDENDUM
[FreeTextEntry1] : DAVID/PVR study on 8/17/18 showed no evidence of significant arterial disease. Patient is medically optimized and she is a low risk patient going for an intermediate risk procedure.

## 2018-08-17 NOTE — HISTORY OF PRESENT ILLNESS
[Asthma] : asthma [Chronic Kidney Disease] : chronic kidney disease [Moderate (4-6 METs)] : Moderate (4-6 METs) [Herbal Supplements: _____] : Herbal Supplements: [unfilled] [Aortic Stenosis] : no aortic stenosis [Atrial Fibrillation] : no atrial fibrillation [Coronary Artery Disease] : no coronary artery disease [Recent Myocardial Infarction] : no recent myocardial infarction [Implantable Device/Pacemaker] : no implantable device/pacemaker [COPD] : no COPD [Sleep Apnea] : no sleep apnea [Smoker] : not a smoker [Family Member] : no family member with adverse anesthesia reaction/sudden death [Self] : no previous adverse anesthesia reaction [Chronic Anticoagulation] : no chronic anticoagulation [Diabetes] : no diabetes [FreeTextEntry1] : bilateral orbital decompression, bilateral lower blepharoplasty [FreeTextEntry2] : 8/23/18 [FreeTextEntry3] : Dr Hinton [FreeTextEntry4] : \par Pt reports "droopy" eyelids and she cannot open them in the morning. It is affecting vision. It is presumed to be from thyroid disease and has been present for a "while" and is now getting worse.\par \par Pt reported no symptoms but on ROS she admits to intermittent claudication symptoms. She sees a Cardiologist and reports she has a mitral valve issue but denied any diagnosis of CAD. [FreeTextEntry6] : pt reports symptoms of pain in both calfs but worse on the right leg when she walks . She denied other cardiac symptoms. [FreeTextEntry7] : EKG - RBBB and poss Left ant fasc block [FreeTextEntry8] : pt reports she can walk 4 blocks or do 2 flights of stairs without exertional symptoms

## 2018-08-17 NOTE — RESULTS/DATA
[ECG Reviewed] : reviewed [No Acute Ischemia] : No acute ischemia [NSR] : normal sinus rhythm [RBBB] : right bundle branch block [Left Anterior Hemiblock] : left anterior hemiblock [No Interval Change] : no interval change [] : results reviewed [de-identified] : CBC is unremarkable [de-identified] : PT/INR/PTT are WNL [de-identified] : SCr is stable at 1.3, GFR stable at 48. rest of CMP unremarkable aside from mild elevation of Total protein to be repeated at next office visit [FreeTextEntry4] : similar to prior 2/22/16

## 2018-08-17 NOTE — ASSESSMENT
[High Risk Surgery - Intraperitoneal, Intrathoracic or Supringuinal Vascular Procedures] : High Risk Surgery - Intraperitoneal, Intrathoracic or Supringuinal Vascular Procedures - No (0) [Ischemic Heart Disease] : Ischemic Heart Disease - No (0) [Congestive Heart Failure] : Congestive Heart Failure - No (0) [Prior Cerebrovascular Accident or TIA] : Prior Cerebrovascular Accident or TIA - No (0) [Creatinine >= 2mg/dL (1 Point)] : Creatinine >= 2mg/dL - No (0) [Insulin-dependent Diabetic (1 Point)] : Insulin-dependent Diabetic - No (0) [0] : 0 , RCRI Class: I, Risk of Post-Op Cardiac Complications: 0.4%, Procedure Risk: Low-Risk [Other: _____] : [unfilled] [Modify medications prior to procedure] : Modify medications prior to procedure [As per surgery] : as per surgery [Patient Optimized for Surgery] : Patient optimized for surgery [FreeTextEntry7] : HOLD diuretic day of surgery, NO NSAIDS, no multivitamins, no herbals prior to surgery. Tylenol only prn any pain

## 2018-10-26 NOTE — DISCHARGE NOTE ADULT - NS AS DC AMI YN
[FreeTextEntry6] : 22 month old F presenting with ED follow up. Recent diagnosis of coxsackie diagnosed in clinic on Oct 15th.  Has had rash for about 2.5 wks.  Now mouth rash resolved, foot rash improving, but still has rash on hands.    Mom concerned about leaving scars, and peeling on fingers. Still has cough, mild congestion and chills.   No fevers, last febrile 1.5 weeks ago.  Eating and drinking well. Sleeping normally. No meds currently. Using humidifier.  \par \par First noticed chills last Friday, called nurse who recommended going to the ED since mom originally described as twitching, but more like chills/shivering so mom decided not to go to the ED. Happened a few times on Friday-Sunday, but since decreasing in frequency. Had one episode today. No eye deviation, convulsions, incontinence. No family history of seizures. \par \par Mom also with behavioral concerns. He bangs his head on floor and crib and Moms face.  Mom says it happens more when he's upset. Doesnt happen when he's alone. no

## 2018-11-26 NOTE — PATIENT PROFILE ADULT. - FUNCTIONAL SCREEN CURRENT LEVEL: TRANSFERRING, MLM
Pt questioned, confirmed taking correct dose f coumadin; reports Missed dose of coumadin on 11/25; pt used correct test strips w/ lot number [id number]  Pt also reports eating more greens than usual  NO other changes       (2) assistive person

## 2019-02-15 ENCOUNTER — APPOINTMENT (OUTPATIENT)
Dept: ORTHOPEDIC SURGERY | Facility: CLINIC | Age: 64
End: 2019-02-15
Payer: COMMERCIAL

## 2019-02-15 VITALS
DIASTOLIC BLOOD PRESSURE: 89 MMHG | HEART RATE: 73 BPM | BODY MASS INDEX: 44.82 KG/M2 | HEIGHT: 65 IN | SYSTOLIC BLOOD PRESSURE: 142 MMHG | WEIGHT: 269 LBS

## 2019-02-15 PROCEDURE — 99213 OFFICE O/P EST LOW 20 MIN: CPT

## 2019-02-15 PROCEDURE — 72100 X-RAY EXAM L-S SPINE 2/3 VWS: CPT

## 2019-02-15 PROCEDURE — 73562 X-RAY EXAM OF KNEE 3: CPT | Mod: 50

## 2019-02-15 PROCEDURE — 72170 X-RAY EXAM OF PELVIS: CPT

## 2019-02-15 NOTE — HISTORY OF PRESENT ILLNESS
[6] : the patient reports pain that is 6/10 in severity [Clean/Dry/Intact] : clean, dry and intact [Healed] : healed [Neuro Intact] : an unremarkable neurological exam [Vascular Intact] : ~T peripheral vascular exam normal [Negative Se's] : maneuvers demonstrated a negative Se's sign [Doing Well] : is doing well [No Sign of Infection] : is showing no signs of infection [Adequate Pain Control] : has adequate pain control [Chills] : no chills [Constipation] : no constipation [Diarrhea] : no diarrhea [Dysuria] : no dysuria [Fever] : no fever [Nausea] : no nausea [Vomiting] : no vomiting [Erythema] : not erythematous [Discharge] : absent of discharge [Swelling] : not swollen [Dehiscence] : not dehisced [de-identified] :  L TKR 4/25/16 which was complicated by tibial plateau fracture, worsening right knee pain lower back pain.  [de-identified] : Walks with a Left stiff knee gait. There is a well-healed scar surgery with no significant swelling, redness or tenderness. There is a valgus alignment of 5°, no effusion, active straight leg raise of 60° and knee range of motion of 0-115° with good stability alignment and quadriceps grade 4 power. [de-identified] : The following radiographs were ordered and read by me during this patients visit. I reviewed each radiograph in detail with the patient and discussed the findings as highlighted below. \par Radiographs of both knees confirm advanced degenerative joint disease with medial joint space narrowing and osteophyte formation of the right knee, the left knee reveals a stable left total knee replacement. \par Radiographs of the lumbar spine reveal DJD. \par  [de-identified] : The patient was informed of the findings and recommended conservative management in the form of a home exercise program, activity modifications, stationary bicycling, and ambulation with a cane. I have provided her with a referral to Dr. Krishna for evaluation of her lower back. \par She has extensive arthritis of the right knee. At this time she will hold off and remain conservative. \par She will follow up in six months.  [de-identified] : Ms. CODI TAVERAS is a 64 year old female presents s/p left total knee replacement two years post op, with continued left knee pain, along the lateral aspect of the distal thigh. She states she has no pain along the knee joint itself, and denies pain when walking or with activity. She denies swelling of the knee, but admits to inflammation of newer varicose veins along the lateral aspect of the distal left thigh. She states she has an appointment with a vascular physician upcoming. She also admits to continued right knee pain, along the medial aspect of the right knee, described as sharp, with activity. She also has continued lower back pain that is chronic in nature. She denies current treatment for her lower back.  [Stable] : stable [Intermit.] : ~He/She~ states the symptoms seem to be intermittent

## 2019-02-15 NOTE — CONSULT LETTER
[Dear  ___] : Dear  [unfilled], [Consult Letter:] : I had the pleasure of evaluating your patient, [unfilled]. [Please see my note below.] : Please see my note below. [Consult Closing:] : Thank you very much for allowing me to participate in the care of this patient.  If you have any questions, please do not hesitate to contact me. [Sincerely,] : Sincerely, [FreeTextEntry2] : MELVINA GREENBERG\par  [FreeTextEntry3] : Seun Estrella MD\par \par ______________________________________________\par [facility]: Hip/Knee Arthroplasty\par [address], [address], [place] [address]\par (t) [phone]\par (f) [phone]\par

## 2019-02-15 NOTE — PHYSICAL EXAM
[de-identified] : On general examination the patient is adequately groomed and nourished. The patient is morbidly obese. The vital parameters are as recorded. \par There is diffuse swelling of the lower extremities, with varicose veins. There is a region of varicose veins along the distal lateral thigh left sided, with no skin warmth/erythema/scars/swelling, no ulcers and no palpable lymph nodes or masses in both lower extremities. Bilateral pedal pulses are well palpable.\par Upper Extremity:\par Both right and left upper extremities are unremarkable in terms of skin rash, lesions, pigmentation, redness, tenderness, swelling, joint instability, abnormal deformity or crepitus. The overall range of motion, sensation, motor tone and strength testing are normal.\par \par Knee Exam:\par The gait is right stiff knee antalgic.\par Knee alignment:            Right 8 degrees varus with mild flexion deformity. \par Left neutral with no flexion deformity.\par The left knee demonstrates a well healed scar from total knee replacement, the right knee demonstrates no scars and the skin has no warmth, erythema, swelling or tenderness. \par Both knees have a range of motion of\par Extension:                    Right -5 degrees                        Left 0 degrees\par Flexion:                                   Right 100 degrees          Left 115 degrees\par Right Knee: There is medial joint line tenderness. There is mild effusion of the right knee. There is no effusion of the left knee. There is tenderness to palpation along the cluster of varicose veins along the distal lateral thigh left knee. \par Right knee: Hannah's test is positive. Efrain test is positive.\par Lachman's test, Anterior/Posterior Drawer test and Pivot Shift Tests are negative. \par There is right knee grade 1 MCL mediolateral laxity and no anteroposterior instability. There is no laxity of the left knee. \par Patella compression test is negative and patellofemoral tracking is normal with no lateral subluxation, apprehension or instability. \par Right knee quadriceps and hamstrings power is 4+.\par Left knee quadriceps and hamstrings power is 4+. \par  [de-identified] : The following radiographs were ordered and read by me during this patients visit. I reviewed each radiograph in detail with the patient and discussed the findings as highlighted below. \par AP, lateral and skyline views of the bilateral knees confirm advanced degenerative joint disease with medial joint space narrowing and osteophyte formation of the right knee, with a stable left total knee replacement. There is some mild demarcation along the lateral aspect of the tibial component, there is stable position of the tibial component, with no change in rotation when compared with previous imaging taken last visit. \par AP lateral of the lumbar spine reveal extensive DJD most prominent L5-S1. \par AP view of the pelvis are within normal limits

## 2019-02-15 NOTE — DISCUSSION/SUMMARY
[de-identified] : s/p left total knee replacement, right knee DJD, with lumbar spine DJD, and obesity. \par Concerning her left knee, there is some demarcation along the lateral aspect of the tibial component, but the implant and her knee on exam remains stable. I have recommended for her to return to the office in six months for close follow up with additional radiographs. I have stressed weight loss, as this will lessen the load on her knees overall. \par Concerning her right knee, The patient was informed of the findings and recommended continued conservative management in the form of a home exercise program, activity modifications, stationary bicycling, swimming and weight loss program. A trial of Glucosamine and Chondroiten Sulphate was recommended.\par She will continue with home exercise and workout in the gym. She does not require prescriptions at this time. \par Follow-up appointment was recommended in 6 months. \par \par

## 2019-04-30 ENCOUNTER — APPOINTMENT (OUTPATIENT)
Dept: INTERNAL MEDICINE | Facility: CLINIC | Age: 64
End: 2019-04-30
Payer: COMMERCIAL

## 2019-04-30 VITALS
HEART RATE: 72 BPM | WEIGHT: 268 LBS | BODY MASS INDEX: 44.65 KG/M2 | OXYGEN SATURATION: 98 % | SYSTOLIC BLOOD PRESSURE: 110 MMHG | DIASTOLIC BLOOD PRESSURE: 65 MMHG | HEIGHT: 65 IN

## 2019-04-30 DIAGNOSIS — J98.4 OTHER DISORDERS OF LUNG: ICD-10-CM

## 2019-04-30 PROCEDURE — 99214 OFFICE O/P EST MOD 30 MIN: CPT

## 2019-05-12 ENCOUNTER — RX RENEWAL (OUTPATIENT)
Age: 64
End: 2019-05-12

## 2019-05-16 ENCOUNTER — FORM ENCOUNTER (OUTPATIENT)
Age: 64
End: 2019-05-16

## 2019-05-17 ENCOUNTER — APPOINTMENT (OUTPATIENT)
Dept: CT IMAGING | Facility: IMAGING CENTER | Age: 64
End: 2019-05-17
Payer: COMMERCIAL

## 2019-05-17 ENCOUNTER — OUTPATIENT (OUTPATIENT)
Dept: OUTPATIENT SERVICES | Facility: HOSPITAL | Age: 64
LOS: 1 days | End: 2019-05-17
Payer: COMMERCIAL

## 2019-05-17 DIAGNOSIS — J98.4 OTHER DISORDERS OF LUNG: ICD-10-CM

## 2019-05-17 PROCEDURE — 71250 CT THORAX DX C-: CPT

## 2019-05-17 PROCEDURE — 71250 CT THORAX DX C-: CPT | Mod: 26

## 2019-05-27 ENCOUNTER — FORM ENCOUNTER (OUTPATIENT)
Age: 64
End: 2019-05-27

## 2019-05-28 ENCOUNTER — APPOINTMENT (OUTPATIENT)
Dept: UROLOGY | Facility: CLINIC | Age: 64
End: 2019-05-28
Payer: COMMERCIAL

## 2019-05-28 ENCOUNTER — OUTPATIENT (OUTPATIENT)
Dept: OUTPATIENT SERVICES | Facility: HOSPITAL | Age: 64
LOS: 1 days | End: 2019-05-28
Payer: COMMERCIAL

## 2019-05-28 ENCOUNTER — APPOINTMENT (OUTPATIENT)
Dept: CT IMAGING | Facility: IMAGING CENTER | Age: 64
End: 2019-05-28
Payer: COMMERCIAL

## 2019-05-28 DIAGNOSIS — N18.3 CHRONIC KIDNEY DISEASE, STAGE 3 (MODERATE): ICD-10-CM

## 2019-05-28 PROCEDURE — 82565 ASSAY OF CREATININE: CPT

## 2019-05-28 PROCEDURE — 74150 CT ABDOMEN W/O CONTRAST: CPT | Mod: 26

## 2019-05-28 PROCEDURE — 99204 OFFICE O/P NEW MOD 45 MIN: CPT

## 2019-05-28 PROCEDURE — 74150 CT ABDOMEN W/O CONTRAST: CPT

## 2019-06-14 ENCOUNTER — APPOINTMENT (OUTPATIENT)
Dept: ORTHOPEDIC SURGERY | Facility: CLINIC | Age: 64
End: 2019-06-14

## 2019-06-28 ENCOUNTER — APPOINTMENT (OUTPATIENT)
Dept: ORTHOPEDIC SURGERY | Facility: CLINIC | Age: 64
End: 2019-06-28
Payer: COMMERCIAL

## 2019-06-28 VITALS
SYSTOLIC BLOOD PRESSURE: 142 MMHG | BODY MASS INDEX: 43.99 KG/M2 | WEIGHT: 264 LBS | HEART RATE: 59 BPM | DIASTOLIC BLOOD PRESSURE: 88 MMHG | HEIGHT: 65 IN

## 2019-06-28 PROCEDURE — 99213 OFFICE O/P EST LOW 20 MIN: CPT

## 2019-06-28 PROCEDURE — 73562 X-RAY EXAM OF KNEE 3: CPT | Mod: LT

## 2019-07-07 ENCOUNTER — TRANSCRIPTION ENCOUNTER (OUTPATIENT)
Age: 64
End: 2019-07-07

## 2019-07-15 ENCOUNTER — APPOINTMENT (OUTPATIENT)
Dept: INTERNAL MEDICINE | Facility: CLINIC | Age: 64
End: 2019-07-15
Payer: COMMERCIAL

## 2019-07-15 ENCOUNTER — NON-APPOINTMENT (OUTPATIENT)
Age: 64
End: 2019-07-15

## 2019-07-15 VITALS
DIASTOLIC BLOOD PRESSURE: 76 MMHG | HEART RATE: 70 BPM | SYSTOLIC BLOOD PRESSURE: 132 MMHG | OXYGEN SATURATION: 95 % | HEIGHT: 65 IN | BODY MASS INDEX: 43.32 KG/M2 | WEIGHT: 260 LBS

## 2019-07-15 DIAGNOSIS — I45.2 BIFASCICULAR BLOCK: ICD-10-CM

## 2019-07-15 DIAGNOSIS — H93.11 TINNITUS, RIGHT EAR: ICD-10-CM

## 2019-07-15 PROCEDURE — 99397 PER PM REEVAL EST PAT 65+ YR: CPT | Mod: 25

## 2019-07-15 PROCEDURE — 99214 OFFICE O/P EST MOD 30 MIN: CPT | Mod: 25

## 2019-07-15 PROCEDURE — G0442 ANNUAL ALCOHOL SCREEN 15 MIN: CPT

## 2019-07-15 PROCEDURE — 93000 ELECTROCARDIOGRAM COMPLETE: CPT

## 2019-07-15 PROCEDURE — G0444 DEPRESSION SCREEN ANNUAL: CPT

## 2019-07-16 NOTE — DISCUSSION/SUMMARY
[de-identified] : s/p left total knee replacement, right knee DJD, with lumbar spine DJD, and obesity. \par Concerning her left knee, there is some demarcation along the lateral aspect of the tibial component, but the implant and her knee on exam remains stable. I have recommended for her to return to the office in six months for close follow up with additional radiographs. I have stressed weight loss, as this will lessen the load on her knees overall. \par She has been recommended to continue with vascular for treatment of varicose veins. \par She was recommended continued conservative management in the form of a home exercise program, activity modifications, stationary bicycling, swimming and weight loss program.\par She will continue with home exercise and workout in the gym. She does not require prescriptions at this time. \par Follow-up appointment was recommended in 6 months (January 2020)\par \par

## 2019-07-16 NOTE — CONSULT LETTER
[Dear  ___] : Dear  [unfilled], [Consult Letter:] : I had the pleasure of evaluating your patient, [unfilled]. [Please see my note below.] : Please see my note below. [Consult Closing:] : Thank you very much for allowing me to participate in the care of this patient.  If you have any questions, please do not hesitate to contact me. [Sincerely,] : Sincerely, [FreeTextEntry2] : MELVINA GREENBERG\par  [FreeTextEntry3] : Seun Estrella MD\par \par ______________________________________________\par [facility]: Hip/Knee Arthroplasty\par [address], [address], [place] [address]\par (t) [phone]\par (f) [phone]\par

## 2019-07-16 NOTE — HISTORY OF PRESENT ILLNESS
[Intermit.] : ~He/She~ states the symptoms seem to be intermittent [Worsening] : worsening [de-identified] : Ms. CODI TAVERAS is a 64 year old female presents s/p left total knee replacement 4/25/2016, with continued left knee pain, along the medial and lateral aspects of the knee.  She was last seen in February 2019, and was recommended for continued home exercises. She states she has no pain along the knee joint itself, and denies pain when walking or with activity. She denies swelling of the knee, but admits to inflammation of newer varicose veins along the lateral aspect of the distal left thigh. She notes her pain is most when laying in bed, with direct pressure of the right knee onto the left while laying in a lateral decubitus position.  \par She also admits to continued right knee pain, along the medial aspect of the right knee, described as sharp, with activity. She also has continued lower back pain that is chronic in nature. She denies current treatment for her lower back. \par She has not been doing any recent physical therapy, but has been working out in the gym.

## 2019-07-16 NOTE — PHYSICAL EXAM
[de-identified] : On general examination the patient is adequately groomed and nourished. The patient is morbidly obese. The vital parameters are as recorded. \par There is diffuse swelling of the lower extremities, with varicose veins. There is a region of varicose veins along the distal lateral thigh left sided, with no skin warmth/erythema/scars/swelling, no ulcers and no palpable lymph nodes or masses in both lower extremities. Bilateral pedal pulses are well palpable.\par Upper Extremity:\par Both right and left upper extremities are unremarkable in terms of skin rash, lesions, pigmentation, redness, tenderness, swelling, joint instability, abnormal deformity or crepitus. The overall range of motion, sensation, motor tone and strength testing are normal.\par \par Knee Exam:\par The gait is right stiff knee antalgic.\par Knee alignment:            Right 8 degrees varus with mild flexion deformity. \par Left neutral with no flexion deformity.\par The left knee demonstrates a well healed scar from total knee replacement, the right knee demonstrates no scars and the skin has no warmth, erythema, swelling or tenderness.\par Both knees have a range of motion of\par Extension:                    Right -5 degrees                        Left 0 degrees\par Flexion:                                   Right 100 degrees          Left 115 degrees\par Right Knee: There is medial joint line tenderness. There is mild effusion of the right knee. There is no effusion of the left knee. There is tenderness to palpation along the cluster of varicose veins along the distal lateral thigh left knee, along with tenderness along the medial aspect of the proximal tibia. \par Right knee: Hannah's test is positive. Efrain test is positive.\par Lachman's test, Anterior/Posterior Drawer test and Pivot Shift Tests are negative. \par There is right knee grade 1 MCL mediolateral laxity and no anteroposterior instability. There is no laxity of the left knee. \par Patella compression test is negative and patellofemoral tracking is normal with no lateral subluxation, apprehension or instability. \par Right knee quadriceps and hamstrings power is 4+.\par Left knee quadriceps and hamstrings power is 4+. \par \par Hip Exam:\par The gait and station is normal\par The patient has equal leg lengths and no pelvic tilt. Satinder/Paramjit test is 7 inches on the right and 7 inches on the left. Active SLR is 60 degrees on the right and 60 degrees on the left. Both hips demonstrate no scars and the skin has no signs of inflammation or tenderness. \par Both Hips have a normal range of motion of flexion to 100 degrees, abduction 40 degrees, adduction 20 degrees, external rotation 40 degrees, internal rotation 20 degrees with symmetrical motion in flexion and extension. There is no flexion contracture, deformity or instability. Labral impingement tests are negative.\par Both hips flexor, abductor and extensor power is normal.\par  [de-identified] : The following radiographs were ordered and read by me during this patients visit. I reviewed each radiograph in detail with the patient and discussed the findings as highlighted below. \par AP, lateral and skyline views of the left knee taken today confirm a stable left total knee replacement. There is some mild demarcation along the lateral aspect of the tibial component, there is stable position of the tibial component, with no change in rotation when compared with previous imaging taken last visit. \par

## 2019-07-22 ENCOUNTER — INPATIENT (INPATIENT)
Facility: HOSPITAL | Age: 64
LOS: 2 days | Discharge: HOME | End: 2019-07-25
Attending: SPECIALIST | Admitting: SPECIALIST
Payer: COMMERCIAL

## 2019-07-22 VITALS
SYSTOLIC BLOOD PRESSURE: 153 MMHG | RESPIRATION RATE: 18 BRPM | TEMPERATURE: 98 F | OXYGEN SATURATION: 100 % | DIASTOLIC BLOOD PRESSURE: 84 MMHG | HEART RATE: 69 BPM

## 2019-07-22 DIAGNOSIS — K56.609 UNSPECIFIED INTESTINAL OBSTRUCTION, UNSPECIFIED AS TO PARTIAL VERSUS COMPLETE OBSTRUCTION: ICD-10-CM

## 2019-07-22 LAB
ALBUMIN SERPL ELPH-MCNC: 4.2 G/DL — SIGNIFICANT CHANGE UP (ref 3.3–5)
ALP SERPL-CCNC: 56 U/L — SIGNIFICANT CHANGE UP (ref 40–120)
ALT FLD-CCNC: 16 U/L — SIGNIFICANT CHANGE UP (ref 4–33)
ANION GAP SERPL CALC-SCNC: 14 MMO/L — SIGNIFICANT CHANGE UP (ref 7–14)
APTT BLD: 35 SEC — SIGNIFICANT CHANGE UP (ref 27.5–36.3)
AST SERPL-CCNC: 18 U/L — SIGNIFICANT CHANGE UP (ref 4–32)
BASOPHILS # BLD AUTO: 0.03 K/UL — SIGNIFICANT CHANGE UP (ref 0–0.2)
BASOPHILS NFR BLD AUTO: 0.3 % — SIGNIFICANT CHANGE UP (ref 0–2)
BILIRUB SERPL-MCNC: 0.3 MG/DL — SIGNIFICANT CHANGE UP (ref 0.2–1.2)
BLD GP AB SCN SERPL QL: NEGATIVE — SIGNIFICANT CHANGE UP
BUN SERPL-MCNC: 16 MG/DL — SIGNIFICANT CHANGE UP (ref 7–23)
CALCIUM SERPL-MCNC: 10.4 MG/DL — SIGNIFICANT CHANGE UP (ref 8.4–10.5)
CHLORIDE SERPL-SCNC: 100 MMOL/L — SIGNIFICANT CHANGE UP (ref 98–107)
CO2 SERPL-SCNC: 25 MMOL/L — SIGNIFICANT CHANGE UP (ref 22–31)
CREAT SERPL-MCNC: 1.49 MG/DL — HIGH (ref 0.5–1.3)
EOSINOPHIL # BLD AUTO: 0 K/UL — SIGNIFICANT CHANGE UP (ref 0–0.5)
EOSINOPHIL NFR BLD AUTO: 0 % — SIGNIFICANT CHANGE UP (ref 0–6)
GLUCOSE SERPL-MCNC: 98 MG/DL — SIGNIFICANT CHANGE UP (ref 70–99)
HCT VFR BLD CALC: 39.3 % — SIGNIFICANT CHANGE UP (ref 34.5–45)
HGB BLD-MCNC: 12.5 G/DL — SIGNIFICANT CHANGE UP (ref 11.5–15.5)
IMM GRANULOCYTES NFR BLD AUTO: 0.3 % — SIGNIFICANT CHANGE UP (ref 0–1.5)
INR BLD: 0.99 — SIGNIFICANT CHANGE UP (ref 0.88–1.17)
LYMPHOCYTES # BLD AUTO: 1.43 K/UL — SIGNIFICANT CHANGE UP (ref 1–3.3)
LYMPHOCYTES # BLD AUTO: 14.5 % — SIGNIFICANT CHANGE UP (ref 13–44)
MCHC RBC-ENTMCNC: 30 PG — SIGNIFICANT CHANGE UP (ref 27–34)
MCHC RBC-ENTMCNC: 31.8 % — LOW (ref 32–36)
MCV RBC AUTO: 94.2 FL — SIGNIFICANT CHANGE UP (ref 80–100)
MONOCYTES # BLD AUTO: 0.35 K/UL — SIGNIFICANT CHANGE UP (ref 0–0.9)
MONOCYTES NFR BLD AUTO: 3.5 % — SIGNIFICANT CHANGE UP (ref 2–14)
NEUTROPHILS # BLD AUTO: 8.05 K/UL — HIGH (ref 1.8–7.4)
NEUTROPHILS NFR BLD AUTO: 81.4 % — HIGH (ref 43–77)
NRBC # FLD: 0 K/UL — SIGNIFICANT CHANGE UP (ref 0–0)
PLATELET # BLD AUTO: 326 K/UL — SIGNIFICANT CHANGE UP (ref 150–400)
PMV BLD: 11 FL — SIGNIFICANT CHANGE UP (ref 7–13)
POTASSIUM SERPL-MCNC: 3.8 MMOL/L — SIGNIFICANT CHANGE UP (ref 3.5–5.3)
POTASSIUM SERPL-SCNC: 3.8 MMOL/L — SIGNIFICANT CHANGE UP (ref 3.5–5.3)
PROT SERPL-MCNC: 8.8 G/DL — HIGH (ref 6–8.3)
PROTHROM AB SERPL-ACNC: 11.3 SEC — SIGNIFICANT CHANGE UP (ref 9.8–13.1)
RBC # BLD: 4.17 M/UL — SIGNIFICANT CHANGE UP (ref 3.8–5.2)
RBC # FLD: 14.6 % — HIGH (ref 10.3–14.5)
RH IG SCN BLD-IMP: POSITIVE — SIGNIFICANT CHANGE UP
SODIUM SERPL-SCNC: 139 MMOL/L — SIGNIFICANT CHANGE UP (ref 135–145)
WBC # BLD: 9.89 K/UL — SIGNIFICANT CHANGE UP (ref 3.8–10.5)
WBC # FLD AUTO: 9.89 K/UL — SIGNIFICANT CHANGE UP (ref 3.8–10.5)

## 2019-07-22 PROCEDURE — 74177 CT ABD & PELVIS W/CONTRAST: CPT | Mod: 26

## 2019-07-22 PROCEDURE — 74018 RADEX ABDOMEN 1 VIEW: CPT | Mod: 26

## 2019-07-22 RX ORDER — VERAPAMIL HCL 240 MG
1 CAPSULE, EXTENDED RELEASE PELLETS 24 HR ORAL
Qty: 0 | Refills: 0 | DISCHARGE

## 2019-07-22 RX ORDER — LEVOTHYROXINE SODIUM 125 MCG
1 TABLET ORAL
Qty: 0 | Refills: 0 | DISCHARGE

## 2019-07-22 RX ORDER — ONDANSETRON 8 MG/1
4 TABLET, FILM COATED ORAL ONCE
Refills: 0 | Status: COMPLETED | OUTPATIENT
Start: 2019-07-22 | End: 2019-07-22

## 2019-07-22 RX ORDER — LEVOTHYROXINE SODIUM 125 MCG
100 TABLET ORAL AT BEDTIME
Refills: 0 | Status: DISCONTINUED | OUTPATIENT
Start: 2019-07-22 | End: 2019-07-25

## 2019-07-22 RX ORDER — BENZOCAINE AND MENTHOL 5; 1 G/100ML; G/100ML
1 LIQUID ORAL EVERY 4 HOURS
Refills: 0 | Status: DISCONTINUED | OUTPATIENT
Start: 2019-07-22 | End: 2019-07-23

## 2019-07-22 RX ORDER — SODIUM CHLORIDE 9 MG/ML
1000 INJECTION INTRAMUSCULAR; INTRAVENOUS; SUBCUTANEOUS ONCE
Refills: 0 | Status: COMPLETED | OUTPATIENT
Start: 2019-07-22 | End: 2019-07-22

## 2019-07-22 RX ORDER — MORPHINE SULFATE 50 MG/1
4 CAPSULE, EXTENDED RELEASE ORAL ONCE
Refills: 0 | Status: DISCONTINUED | OUTPATIENT
Start: 2019-07-22 | End: 2019-07-22

## 2019-07-22 RX ORDER — HEPARIN SODIUM 5000 [USP'U]/ML
5000 INJECTION INTRAVENOUS; SUBCUTANEOUS EVERY 8 HOURS
Refills: 0 | Status: DISCONTINUED | OUTPATIENT
Start: 2019-07-22 | End: 2019-07-25

## 2019-07-22 RX ORDER — ACETAMINOPHEN 500 MG
1000 TABLET ORAL ONCE
Refills: 0 | Status: COMPLETED | OUTPATIENT
Start: 2019-07-22 | End: 2019-07-22

## 2019-07-22 RX ORDER — SODIUM CHLORIDE 9 MG/ML
1000 INJECTION, SOLUTION INTRAVENOUS
Refills: 0 | Status: DISCONTINUED | OUTPATIENT
Start: 2019-07-22 | End: 2019-07-24

## 2019-07-22 RX ORDER — SODIUM CHLORIDE 9 MG/ML
500 INJECTION INTRAMUSCULAR; INTRAVENOUS; SUBCUTANEOUS ONCE
Refills: 0 | Status: COMPLETED | OUTPATIENT
Start: 2019-07-22 | End: 2019-07-22

## 2019-07-22 RX ADMIN — ONDANSETRON 4 MILLIGRAM(S): 8 TABLET, FILM COATED ORAL at 15:31

## 2019-07-22 RX ADMIN — SODIUM CHLORIDE 1000 MILLILITER(S): 9 INJECTION INTRAMUSCULAR; INTRAVENOUS; SUBCUTANEOUS at 16:36

## 2019-07-22 RX ADMIN — SODIUM CHLORIDE 1000 MILLILITER(S): 9 INJECTION INTRAMUSCULAR; INTRAVENOUS; SUBCUTANEOUS at 20:25

## 2019-07-22 RX ADMIN — MORPHINE SULFATE 4 MILLIGRAM(S): 50 CAPSULE, EXTENDED RELEASE ORAL at 20:57

## 2019-07-22 RX ADMIN — Medication 400 MILLIGRAM(S): at 22:25

## 2019-07-22 RX ADMIN — SODIUM CHLORIDE 1000 MILLILITER(S): 9 INJECTION INTRAMUSCULAR; INTRAVENOUS; SUBCUTANEOUS at 15:30

## 2019-07-22 RX ADMIN — Medication 1000 MILLIGRAM(S): at 15:47

## 2019-07-22 RX ADMIN — Medication 1000 MILLIGRAM(S): at 16:00

## 2019-07-22 RX ADMIN — MORPHINE SULFATE 4 MILLIGRAM(S): 50 CAPSULE, EXTENDED RELEASE ORAL at 18:59

## 2019-07-22 RX ADMIN — Medication 400 MILLIGRAM(S): at 15:32

## 2019-07-22 NOTE — ED PROVIDER NOTE - ATTENDING CONTRIBUTION TO CARE
Dr. Dumont: I have personally performed a face to face bedside history and physical examination of this patient. I have discussed the history, examination, review of systems, assessment and plan of management with the resident. I have reviewed the electronic medical record and amended it to reflect my history, review of systems, physical exam, assessment and plan.      64F s/p mutiple abd surgeries p/w abd pain since tonight. no n/v. diarrhea yesterday. no f/c, urinary symptoms.    on exam  distended, diffuse tenderness, no pulsatile mass  uncomfortable    concerning for SBO, r/o perforation. less likely  mesenteric ischemia or AAA or UTI.  Plan for labs, pain control, pain control, CT a/p.

## 2019-07-22 NOTE — H&P ADULT - ASSESSMENT
64F p/w adhesive SBO    -admit to B team surgery, Dr. Mcdonough  -NPO/IVF  -NGT  -no PRN pain meds  page [phone] with surgical questions    Libra Last, PGY-4

## 2019-07-22 NOTE — H&P ADULT - NSICDXPASTMEDICALHX_GEN_ALL_CORE_FT
PAST MEDICAL HISTORY:  Adult hypothyroidism s/p radioactive iodine for grave's, 1996    History of kidney cancer s/p partial nephrectomy, denies chemo and radiation    Hypertension     Osteoarthritis     Small bowel obstruction 4/2016 tx with ngt

## 2019-07-22 NOTE — ED ADULT NURSE NOTE - CHIEF COMPLAINT QUOTE
pt c/o abdominal pain since 4am, has had 10 past intestinal obstructions, all resolved with NGT PMH: hysterectomy, partial nephrectomy

## 2019-07-22 NOTE — ED ADULT TRIAGE NOTE - CHIEF COMPLAINT QUOTE
pt c/o abdominal pain since 4am, has had 10 past intestinal obstructions pt c/o abdominal pain since 4am, has had 10 past intestinal obstructions, all resolved with NGT PMH: hysterectomy, partial nephrectomy

## 2019-07-22 NOTE — ED ADULT NURSE NOTE - NSIMPLEMENTINTERV_GEN_ALL_ED
Implemented All Universal Safety Interventions:  [place] to call system. Call bell, personal items and telephone within reach. Instruct patient to call for assistance. Room bathroom lighting operational. Non-slip footwear when patient is off stretcher. Physically safe environment: no spills, clutter or unnecessary equipment. Stretcher in lowest position, wheels locked, appropriate side rails in place.

## 2019-07-22 NOTE — ED PROVIDER NOTE - OBJECTIVE STATEMENT
64F hx multiple abdominal surgeries and multiple subsequent sbo's 2/2 adhesions p/w abdominal pain, diffuse, assoc w/ mild nausea and multiple episodes of nb diarrhea yesterday. Pt says abdominal pain is sharp, across her abdomen, woke her from sleep ~4am. Feels "like my typical SBO pain". Pt requesting dr. gonzalez is he's available. Denies f/c, cough, vomiting, cp.

## 2019-07-22 NOTE — H&P ADULT - NSICDXPASTSURGICALHX_GEN_ALL_CORE_FT
PAST SURGICAL HISTORY:  H/O abdominal hysterectomy     H/O  section     H/O partial nephrectomy left 2007

## 2019-07-22 NOTE — ED ADULT NURSE REASSESSMENT NOTE - NS ED NURSE REASSESS COMMENT FT1
report received at shift change from NICOLE BLACKMON, pt remains AAOx3, awaiting surgery bed upstairs, NGT remains connected to low intermittent suction, pt c/o abdominal pain, surgery MD paged for pain meds, will continue to monitor pt.

## 2019-07-22 NOTE — ED ADULT NURSE NOTE - OBJECTIVE STATEMENT
Received pt into spot #24. Pt A/o x 3 calm cooperative, Pt grimacing at times, c/o generalized abdominal pain with nausea/vomiting since 0400. Pt was awoken from sleep. Abdomen soft/nondistended, abdomen obese, tender abdomen. Denies fever/chills, chest pain, palpitations. Hx of 9 bowel obtrsuctions in the past. Pt states she feels dehydrated. Pt's daughter at bedside. VSS. Awaiting eval by MD.

## 2019-07-22 NOTE — H&P ADULT - NSHPLABSRESULTS_GEN_ALL_CORE
CBC Full  -  ( 22 Jul 2019 15:22 )  WBC Count : 9.89 K/uL  RBC Count : 4.17 M/uL  Hemoglobin : 12.5 g/dL  Hematocrit : 39.3 %  Platelet Count - Automated : 326 K/uL  Mean Cell Volume : 94.2 fL  Mean Cell Hemoglobin : 30.0 pg  Mean Cell Hemoglobin Concentration : 31.8 %  Auto Neutrophil # : 8.05 K/uL  Auto Lymphocyte # : 1.43 K/uL  Auto Monocyte # : 0.35 K/uL  Auto Eosinophil # : 0.00 K/uL  Auto Basophil # : 0.03 K/uL  Auto Neutrophil % : 81.4 %  Auto Lymphocyte % : 14.5 %  Auto Monocyte % : 3.5 %  Auto Eosinophil % : 0.0 %  Auto Basophil % : 0.3 %    07-22    139  |  100  |  16  ----------------------------<  98  3.8   |  25  |  1.49<H>    Ca    10.4      22 Jul 2019 15:22    TPro  8.8<H>  /  Alb  4.2  /  TBili  0.3  /  DBili  x   /  AST  18  /  ALT  16  /  AlkPhos  56  07-22    LIVER FUNCTIONS - ( 22 Jul 2019 15:22 )  Alb: 4.2 g/dL / Pro: 8.8 g/dL / ALK PHOS: 56 u/L / ALT: 16 u/L / AST: 18 u/L / GGT: x           CAPILLARY BLOOD GLUCOSE          PT/INR - ( 22 Jul 2019 15:10 )   PT: 11.3 SEC;   INR: 0.99          PTT - ( 22 Jul 2019 15:10 )  PTT:35.0 SEC

## 2019-07-22 NOTE — H&P ADULT - HISTORY OF PRESENT ILLNESS
64F with history of multiple abdominal surgeries and multiple adhesive SBOs in the past, all managed non operatively, presents with one day of abdominal pain. As per patient, the pain woke her from sleep at 4am. Pain associated with burping and one episode of emesis on arrival to the ED. Pain mostly left sided in nature and cramping, worse with palpation.   Patient has had 9 SBOs in the past, all managed non operatively. She had a partial nephrectomy in 2007 and a hysterectomy in 2003.  In the ED, patient with normal vital signs and labs. Abdomen with left sided tenderness. CT scan showing fecalization of the distal ileum, SBO vs ileus.

## 2019-07-22 NOTE — ED PROVIDER NOTE - CLINICAL SUMMARY MEDICAL DECISION MAKING FREE TEXT BOX
64F p/w abdominal distension and pain in setting of hx of sbo's. Diffusely ttp. Will obtain labs, vbg, ctap, reasssess. likely surg related given history.

## 2019-07-22 NOTE — H&P ADULT - NSHPPHYSICALEXAM_GEN_ALL_CORE
General: WN/WD NAD  Neurology: A&Ox3, nonfocal, KELLOGG x 4  Head:  Normocephalic, atraumatic  ENT:  Mucosa moist, no ulcerations  Neck:  Supple, no sinuses or palpable masses  Lymphatic:  No palpable cervical, supraclavicular, axillary or inguinal adenopathy  Respiratory: CTA B/L  CV: RRR, S1S2, no murmur  Abdominal: Soft, distended, mild left sided tenderness

## 2019-07-23 LAB
ANION GAP SERPL CALC-SCNC: 14 MMO/L — SIGNIFICANT CHANGE UP (ref 7–14)
BUN SERPL-MCNC: 14 MG/DL — SIGNIFICANT CHANGE UP (ref 7–23)
CALCIUM SERPL-MCNC: 9.4 MG/DL — SIGNIFICANT CHANGE UP (ref 8.4–10.5)
CHLORIDE SERPL-SCNC: 103 MMOL/L — SIGNIFICANT CHANGE UP (ref 98–107)
CO2 SERPL-SCNC: 24 MMOL/L — SIGNIFICANT CHANGE UP (ref 22–31)
CREAT SERPL-MCNC: 1.15 MG/DL — SIGNIFICANT CHANGE UP (ref 0.5–1.3)
GLUCOSE SERPL-MCNC: 101 MG/DL — HIGH (ref 70–99)
HCT VFR BLD CALC: 35.8 % — SIGNIFICANT CHANGE UP (ref 34.5–45)
HCV AB S/CO SERPL IA: 0.09 S/CO — SIGNIFICANT CHANGE UP (ref 0–0.99)
HCV AB SERPL-IMP: SIGNIFICANT CHANGE UP
HGB BLD-MCNC: 11.5 G/DL — SIGNIFICANT CHANGE UP (ref 11.5–15.5)
MAGNESIUM SERPL-MCNC: 2 MG/DL — SIGNIFICANT CHANGE UP (ref 1.6–2.6)
MCHC RBC-ENTMCNC: 30 PG — SIGNIFICANT CHANGE UP (ref 27–34)
MCHC RBC-ENTMCNC: 32.1 % — SIGNIFICANT CHANGE UP (ref 32–36)
MCV RBC AUTO: 93.5 FL — SIGNIFICANT CHANGE UP (ref 80–100)
NRBC # FLD: 0 K/UL — SIGNIFICANT CHANGE UP (ref 0–0)
PHOSPHATE SERPL-MCNC: 3.2 MG/DL — SIGNIFICANT CHANGE UP (ref 2.5–4.5)
PLATELET # BLD AUTO: 299 K/UL — SIGNIFICANT CHANGE UP (ref 150–400)
PMV BLD: 11.1 FL — SIGNIFICANT CHANGE UP (ref 7–13)
POTASSIUM SERPL-MCNC: 3.6 MMOL/L — SIGNIFICANT CHANGE UP (ref 3.5–5.3)
POTASSIUM SERPL-SCNC: 3.6 MMOL/L — SIGNIFICANT CHANGE UP (ref 3.5–5.3)
RBC # BLD: 3.83 M/UL — SIGNIFICANT CHANGE UP (ref 3.8–5.2)
RBC # FLD: 14.6 % — HIGH (ref 10.3–14.5)
SODIUM SERPL-SCNC: 141 MMOL/L — SIGNIFICANT CHANGE UP (ref 135–145)
WBC # BLD: 8.06 K/UL — SIGNIFICANT CHANGE UP (ref 3.8–10.5)
WBC # FLD AUTO: 8.06 K/UL — SIGNIFICANT CHANGE UP (ref 3.8–10.5)

## 2019-07-23 RX ORDER — PANTOPRAZOLE SODIUM 20 MG/1
40 TABLET, DELAYED RELEASE ORAL ONCE
Refills: 0 | Status: DISCONTINUED | OUTPATIENT
Start: 2019-07-23 | End: 2019-07-25

## 2019-07-23 RX ORDER — ONDANSETRON 8 MG/1
4 TABLET, FILM COATED ORAL ONCE
Refills: 0 | Status: COMPLETED | OUTPATIENT
Start: 2019-07-23 | End: 2019-07-23

## 2019-07-23 RX ORDER — ACETAMINOPHEN 500 MG
1000 TABLET ORAL ONCE
Refills: 0 | Status: DISCONTINUED | OUTPATIENT
Start: 2019-07-23 | End: 2019-07-25

## 2019-07-23 RX ORDER — BENZOCAINE AND MENTHOL 5; 1 G/100ML; G/100ML
1 LIQUID ORAL EVERY 4 HOURS
Refills: 0 | Status: DISCONTINUED | OUTPATIENT
Start: 2019-07-23 | End: 2019-07-25

## 2019-07-23 RX ORDER — ACETAMINOPHEN 500 MG
1000 TABLET ORAL ONCE
Refills: 0 | Status: COMPLETED | OUTPATIENT
Start: 2019-07-23 | End: 2019-07-23

## 2019-07-23 RX ADMIN — Medication 400 MILLIGRAM(S): at 04:41

## 2019-07-23 RX ADMIN — HEPARIN SODIUM 5000 UNIT(S): 5000 INJECTION INTRAVENOUS; SUBCUTANEOUS at 13:28

## 2019-07-23 RX ADMIN — HEPARIN SODIUM 5000 UNIT(S): 5000 INJECTION INTRAVENOUS; SUBCUTANEOUS at 22:01

## 2019-07-23 RX ADMIN — HEPARIN SODIUM 5000 UNIT(S): 5000 INJECTION INTRAVENOUS; SUBCUTANEOUS at 05:48

## 2019-07-23 RX ADMIN — ONDANSETRON 4 MILLIGRAM(S): 8 TABLET, FILM COATED ORAL at 13:29

## 2019-07-23 RX ADMIN — Medication 1000 MILLIGRAM(S): at 05:11

## 2019-07-23 RX ADMIN — Medication 100 MICROGRAM(S): at 22:01

## 2019-07-23 NOTE — PROGRESS NOTE ADULT - ASSESSMENT
64F presenting with abd pain having a SBO    Plan:  -NPO  -NGT  -IV Tylenol  -Morphine 64F with history of multiple abdominal surgeries and multiple adhesive SBOs in the past, all managed non operatively, presents with one day of abdominal pain.    Plan:  -NPO  -NGT  -IV Tylenol  -Morphine    B team surgery  [id number]

## 2019-07-23 NOTE — PROGRESS NOTE ADULT - ATTENDING COMMENTS
Above noted. Has frequent colicky abdominal pain, no flatus or bowel movement.  Physical Exam: Afebrile.   Abdomen: Soft, distended, tender over the left para-umbilical region.  Labs: WBC within normal limits.  Plan: NPO. IV fluids, DVT prophylaxis, check lactate, no pain medication.

## 2019-07-23 NOTE — PROGRESS NOTE ADULT - SUBJECTIVE AND OBJECTIVE BOX
GENERAL SURGERY DAILY PROGRESS NOTE:    Interval:  Pain overnight endorsed.    Subjective:  Patient seen and examined this am. Reports pain. Denies N/V/D. -Voiding. -Passing flatus, -BM. -OOB.    Exam:  Gen: NAD, resting in bed, alert and responding appropriately  Resp: Airway patent, non-labored respirations  Abd: Soft, ND, NTTP, no rebound or guarding. Scar  Ext: No edema, WWP  Neuro: AAOx3, no focal deficits    Vital Signs Last 24 Hrs  T(C): 36.7 (22 Jul 2019 23:50), Max: 36.8 (22 Jul 2019 18:23)  T(F): 98.1 (22 Jul 2019 23:50), Max: 98.2 (22 Jul 2019 18:23)  HR: 66 (22 Jul 2019 23:50) (65 - 98)  BP: 124/69 (22 Jul 2019 23:50) (124/69 - 170/86)  BP(mean): --  RR: 18 (22 Jul 2019 23:50) (18 - 19)  SpO2: 98% (22 Jul 2019 23:50) (96% - 100%)    I&O's Detail      Daily Height in cm: 170.18 (22 Jul 2019 23:50)    Daily     MEDICATIONS  (STANDING):  heparin  Injectable 5000 Unit(s) SubCutaneous every 8 hours  lactated ringers. 1000 milliLiter(s) (100 mL/Hr) IV Continuous <Continuous>  levothyroxine Injectable 100 MICROGram(s) IV Push at bedtime    MEDICATIONS  (PRN):  benzocaine 15 mG/menthol 3.6 mG Lozenge 1 Lozenge Oral every 4 hours PRN Sore Throat      LABS:                        12.5   9.89  )-----------( 326      ( 22 Jul 2019 15:22 )             39.3     07-22    139  |  100  |  16  ----------------------------<  98  3.8   |  25  |  1.49<H>    Ca    10.4      22 Jul 2019 15:22    TPro  8.8<H>  /  Alb  4.2  /  TBili  0.3  /  DBili  x   /  AST  18  /  ALT  16  /  AlkPhos  56  07-22    PT/INR - ( 22 Jul 2019 15:10 )   PT: 11.3 SEC;   INR: 0.99          PTT - ( 22 Jul 2019 15:10 )  PTT:35.0 SEC      Dispo:     YRN Hurd, PGY-1  B Team Surgery  [id number] with any questions GENERAL SURGERY DAILY PROGRESS NOTE:    Interval:  Pain overnight endorsed.    Subjective:  Patient seen and examined this am. Reports pain. Denies N/V/D. -Voiding. -Passing flatus, -BM. -OOB.    Exam:  Gen: NAD, resting in bed, alert and responding appropriately  Resp: Airway patent, non-labored respirations  Abd: Soft, distended, TTP, no rebound or guarding.   Neuro: AAOx3, no focal deficits    Vital Signs Last 24 Hrs  T(C): 37 (23 Jul 2019 10:15), Max: 37.2 (23 Jul 2019 02:48)  T(F): 98.6 (23 Jul 2019 10:15), Max: 99 (23 Jul 2019 02:48)  HR: 69 (23 Jul 2019 10:15) (65 - 98)  BP: 132/79 (23 Jul 2019 10:15) (118/71 - 170/86)  BP(mean): --  RR: 17 (23 Jul 2019 10:15) (17 - 19)  SpO2: 100% (23 Jul 2019 10:15) (96% - 100%)    07-23    141  |  103  |  14  ----------------------------<  101<H>  3.6   |  24  |  1.15    Ca    9.4      23 Jul 2019 06:30  Phos  3.2     07-23  Mg     2.0     07-23    TPro  8.8<H>  /  Alb  4.2  /  TBili  0.3  /  DBili  x   /  AST  18  /  ALT  16  /  AlkPhos  56  07-22                          11.5   8.06  )-----------( 299      ( 23 Jul 2019 06:30 )             35.8

## 2019-07-24 LAB
ANION GAP SERPL CALC-SCNC: 13 MMO/L — SIGNIFICANT CHANGE UP (ref 7–14)
BUN SERPL-MCNC: 12 MG/DL — SIGNIFICANT CHANGE UP (ref 7–23)
CALCIUM SERPL-MCNC: 9.3 MG/DL — SIGNIFICANT CHANGE UP (ref 8.4–10.5)
CHLORIDE SERPL-SCNC: 104 MMOL/L — SIGNIFICANT CHANGE UP (ref 98–107)
CO2 SERPL-SCNC: 25 MMOL/L — SIGNIFICANT CHANGE UP (ref 22–31)
CREAT SERPL-MCNC: 1.15 MG/DL — SIGNIFICANT CHANGE UP (ref 0.5–1.3)
GLUCOSE SERPL-MCNC: 103 MG/DL — HIGH (ref 70–99)
HCT VFR BLD CALC: 36.6 % — SIGNIFICANT CHANGE UP (ref 34.5–45)
HGB BLD-MCNC: 11.6 G/DL — SIGNIFICANT CHANGE UP (ref 11.5–15.5)
LACTATE SERPL-SCNC: 1.2 MMOL/L — SIGNIFICANT CHANGE UP (ref 0.5–2)
MAGNESIUM SERPL-MCNC: 1.9 MG/DL — SIGNIFICANT CHANGE UP (ref 1.6–2.6)
MCHC RBC-ENTMCNC: 29.9 PG — SIGNIFICANT CHANGE UP (ref 27–34)
MCHC RBC-ENTMCNC: 31.7 % — LOW (ref 32–36)
MCV RBC AUTO: 94.3 FL — SIGNIFICANT CHANGE UP (ref 80–100)
NRBC # FLD: 0.02 K/UL — SIGNIFICANT CHANGE UP (ref 0–0)
PHOSPHATE SERPL-MCNC: 2.9 MG/DL — SIGNIFICANT CHANGE UP (ref 2.5–4.5)
PLATELET # BLD AUTO: 296 K/UL — SIGNIFICANT CHANGE UP (ref 150–400)
PMV BLD: 11.1 FL — SIGNIFICANT CHANGE UP (ref 7–13)
POTASSIUM SERPL-MCNC: 3.3 MMOL/L — LOW (ref 3.5–5.3)
POTASSIUM SERPL-SCNC: 3.3 MMOL/L — LOW (ref 3.5–5.3)
RBC # BLD: 3.88 M/UL — SIGNIFICANT CHANGE UP (ref 3.8–5.2)
RBC # FLD: 14.7 % — HIGH (ref 10.3–14.5)
SODIUM SERPL-SCNC: 142 MMOL/L — SIGNIFICANT CHANGE UP (ref 135–145)
WBC # BLD: 6.84 K/UL — SIGNIFICANT CHANGE UP (ref 3.8–10.5)
WBC # FLD AUTO: 6.84 K/UL — SIGNIFICANT CHANGE UP (ref 3.8–10.5)

## 2019-07-24 RX ORDER — SODIUM CHLORIDE 9 MG/ML
1000 INJECTION, SOLUTION INTRAVENOUS
Refills: 0 | Status: DISCONTINUED | OUTPATIENT
Start: 2019-07-24 | End: 2019-07-25

## 2019-07-24 RX ORDER — POTASSIUM CHLORIDE 20 MEQ
10 PACKET (EA) ORAL
Refills: 0 | Status: COMPLETED | OUTPATIENT
Start: 2019-07-24 | End: 2019-07-24

## 2019-07-24 RX ADMIN — Medication 100 MILLIEQUIVALENT(S): at 09:33

## 2019-07-24 RX ADMIN — Medication 100 MILLIEQUIVALENT(S): at 14:48

## 2019-07-24 RX ADMIN — HEPARIN SODIUM 5000 UNIT(S): 5000 INJECTION INTRAVENOUS; SUBCUTANEOUS at 06:17

## 2019-07-24 RX ADMIN — Medication 100 MILLIEQUIVALENT(S): at 12:02

## 2019-07-24 NOTE — PROGRESS NOTE ADULT - ASSESSMENT
64F with history of multiple abdominal surgeries and multiple adhesive SBOs in the past, all managed non operatively, presents with one day of abdominal pain.    Plan:  - Diet: NPO, NGT, IVF (D5 1/2NS)  -IV Tylenol  - dvt ppx: heparin     B team surgery  [id number] 64F with history of multiple abdominal surgeries and multiple adhesive SBOs in the past, all managed non operatively, presents with one day of abdominal pain.    Plan:  - Diet: NPO, NGT, IVF (D5 1/2NS)  - IV Tylenol  - F/u GI function  - dvt ppx: heparin     B team surgery  [id number]

## 2019-07-24 NOTE — PROGRESS NOTE ADULT - SUBJECTIVE AND OBJECTIVE BOX
GENERAL SURGERY DAILY PROGRESS NOTE:    Interval:  Epigastric pain.    Subjective:  Patient seen and examined this am. Denies pain. Denies N/V/D. -/- F/BM Refusing tylenol.    Exam:  Gen: NAD, resting in bed, alert and responding appropriately  Resp: Airway patent, non-labored respirations  Abd: Soft, distended, TTP (L>R), no rebound or guarding.   Neuro: AAOx3, no focal deficits      T(C): 37 (07-23-19 @ 21:50), Max: 37.7 (07-23-19 @ 19:00)  HR: 66 (07-23-19 @ 21:50) (65 - 69)  BP: 156/99 (07-23-19 @ 21:50) (118/71 - 156/99)  RR: 18 (07-23-19 @ 21:50) (16 - 18)  SpO2: 96% (07-23-19 @ 21:50) (96% - 100%)  Wt(kg): --    LABS:                        11.5   8.06  )-----------( 299      ( 23 Jul 2019 06:30 )             35.8     07-23    141  |  103  |  14  ----------------------------<  101<H>  3.6   |  24  |  1.15    Ca    9.4      23 Jul 2019 06:30  Phos  3.2     07-23  Mg     2.0     07-23    TPro  8.8<H>  /  Alb  4.2  /  TBili  0.3  /  DBili  x   /  AST  18  /  ALT  16  /  AlkPhos  56  07-22    PT/INR - ( 22 Jul 2019 15:10 )   PT: 11.3 SEC;   INR: 0.99          PTT - ( 22 Jul 2019 15:10 )  PTT:35.0 SEC      CAPILLARY BLOOD GLUCOSE          CAPILLARY BLOOD GLUCOSE        CAPILLARY BLOOD GLUCOSE                  RECENT CULTURES:      Is&O's    07-22-19 @ 07:01  -  07-23-19 @ 07:00  --------------------------------------------------------  IN: 700 mL / OUT: 325 mL / NET: 375 mL    07-23-19 @ 07:01  -  07-24-19 @ 02:21  --------------------------------------------------------  IN: 1600 mL / OUT: 1250 mL / NET: 350 mL        MEDS  acetaminophen  IVPB .. 1000 milliGRAM(s) IV Intermittent once  benzocaine 15 mG/menthol 3.6 mG (Sugar-Free) Lozenge 1 Lozenge Oral every 4 hours PRN  heparin  Injectable 5000 Unit(s) SubCutaneous every 8 hours  lactated ringers. 1000 milliLiter(s) IV Continuous <Continuous>  levothyroxine Injectable 100 MICROGram(s) IV Push at bedtime  pantoprazole  Injectable 40 milliGRAM(s) IV Push once GENERAL SURGERY DAILY PROGRESS NOTE:    Interval:  Epigastric pain, given protonix.    Subjective:  Patient seen and examined this am. NGT in place. +flatus, -BM     Exam:  Gen: NAD, resting in bed, alert and responding appropriately  Resp: Airway patent, non-labored respirations  Abd: Soft, distended, TTP (L>R), no rebound or guarding.   Neuro: AAOx3, no focal deficits    Vital Signs Last 24 Hrs  T(C): 37.1 (24 Jul 2019 10:14), Max: 37.7 (23 Jul 2019 19:00)  T(F): 98.7 (24 Jul 2019 10:14), Max: 99.9 (23 Jul 2019 19:00)  HR: 63 (24 Jul 2019 10:14) (63 - 68)  BP: 136/71 (24 Jul 2019 10:14) (122/78 - 156/99)  BP(mean): --  RR: 17 (24 Jul 2019 10:14) (16 - 18)  SpO2: 100% (24 Jul 2019 10:14) (96% - 100%)    07-24    142  |  104  |  12  ----------------------------<  103<H>  3.3<L>   |  25  |  1.15    Ca    9.3      24 Jul 2019 06:35  Phos  2.9     07-24  Mg     1.9     07-24                          11.6   6.84  )-----------( 296      ( 24 Jul 2019 06:35 )             36.6

## 2019-07-24 NOTE — PROGRESS NOTE ADULT - ATTENDING COMMENTS
Above noted. Feels better, no pain, passing flatus, no bowel movement.  Physical Exam: Afebrile.  Abdomen: Soft, non-tender.  Plan: NG tube removed. May have sips of water. Ambulate.

## 2019-07-25 ENCOUNTER — TRANSCRIPTION ENCOUNTER (OUTPATIENT)
Age: 64
End: 2019-07-25

## 2019-07-25 VITALS
SYSTOLIC BLOOD PRESSURE: 127 MMHG | OXYGEN SATURATION: 100 % | TEMPERATURE: 99 F | HEART RATE: 60 BPM | RESPIRATION RATE: 18 BRPM | DIASTOLIC BLOOD PRESSURE: 72 MMHG

## 2019-07-25 LAB
ANION GAP SERPL CALC-SCNC: 10 MMO/L — SIGNIFICANT CHANGE UP (ref 7–14)
BUN SERPL-MCNC: 10 MG/DL — SIGNIFICANT CHANGE UP (ref 7–23)
CALCIUM SERPL-MCNC: 9.3 MG/DL — SIGNIFICANT CHANGE UP (ref 8.4–10.5)
CHLORIDE SERPL-SCNC: 108 MMOL/L — HIGH (ref 98–107)
CO2 SERPL-SCNC: 25 MMOL/L — SIGNIFICANT CHANGE UP (ref 22–31)
CREAT SERPL-MCNC: 1.15 MG/DL — SIGNIFICANT CHANGE UP (ref 0.5–1.3)
GLUCOSE SERPL-MCNC: 87 MG/DL — SIGNIFICANT CHANGE UP (ref 70–99)
HCT VFR BLD CALC: 35 % — SIGNIFICANT CHANGE UP (ref 34.5–45)
HGB BLD-MCNC: 11 G/DL — LOW (ref 11.5–15.5)
MAGNESIUM SERPL-MCNC: 1.9 MG/DL — SIGNIFICANT CHANGE UP (ref 1.6–2.6)
MCHC RBC-ENTMCNC: 29.6 PG — SIGNIFICANT CHANGE UP (ref 27–34)
MCHC RBC-ENTMCNC: 31.4 % — LOW (ref 32–36)
MCV RBC AUTO: 94.3 FL — SIGNIFICANT CHANGE UP (ref 80–100)
NRBC # FLD: 0 K/UL — SIGNIFICANT CHANGE UP (ref 0–0)
PHOSPHATE SERPL-MCNC: 2.1 MG/DL — LOW (ref 2.5–4.5)
PLATELET # BLD AUTO: 270 K/UL — SIGNIFICANT CHANGE UP (ref 150–400)
PMV BLD: 10.9 FL — SIGNIFICANT CHANGE UP (ref 7–13)
POTASSIUM SERPL-MCNC: 3.6 MMOL/L — SIGNIFICANT CHANGE UP (ref 3.5–5.3)
POTASSIUM SERPL-SCNC: 3.6 MMOL/L — SIGNIFICANT CHANGE UP (ref 3.5–5.3)
RBC # BLD: 3.71 M/UL — LOW (ref 3.8–5.2)
RBC # FLD: 14.3 % — SIGNIFICANT CHANGE UP (ref 10.3–14.5)
SODIUM SERPL-SCNC: 143 MMOL/L — SIGNIFICANT CHANGE UP (ref 135–145)
WBC # BLD: 7.64 K/UL — SIGNIFICANT CHANGE UP (ref 3.8–10.5)
WBC # FLD AUTO: 7.64 K/UL — SIGNIFICANT CHANGE UP (ref 3.8–10.5)

## 2019-07-25 RX ORDER — POTASSIUM CHLORIDE 20 MEQ
10 PACKET (EA) ORAL
Refills: 0 | Status: DISCONTINUED | OUTPATIENT
Start: 2019-07-25 | End: 2019-07-25

## 2019-07-25 RX ORDER — POTASSIUM PHOSPHATE, MONOBASIC POTASSIUM PHOSPHATE, DIBASIC 236; 224 MG/ML; MG/ML
15 INJECTION, SOLUTION INTRAVENOUS ONCE
Refills: 0 | Status: DISCONTINUED | OUTPATIENT
Start: 2019-07-25 | End: 2019-07-25

## 2019-07-25 RX ORDER — LEVOTHYROXINE SODIUM 125 MCG
200 TABLET ORAL DAILY
Refills: 0 | Status: DISCONTINUED | OUTPATIENT
Start: 2019-07-25 | End: 2019-07-25

## 2019-07-25 RX ORDER — POTASSIUM CHLORIDE 20 MEQ
40 PACKET (EA) ORAL EVERY 4 HOURS
Refills: 0 | Status: DISCONTINUED | OUTPATIENT
Start: 2019-07-25 | End: 2019-07-25

## 2019-07-25 RX ORDER — POTASSIUM CHLORIDE 20 MEQ
40 PACKET (EA) ORAL ONCE
Refills: 0 | Status: COMPLETED | OUTPATIENT
Start: 2019-07-25 | End: 2019-07-25

## 2019-07-25 RX ADMIN — Medication 200 MICROGRAM(S): at 06:28

## 2019-07-25 RX ADMIN — Medication 40 MILLIEQUIVALENT(S): at 09:37

## 2019-07-25 NOTE — PROGRESS NOTE ADULT - SUBJECTIVE AND OBJECTIVE BOX
GENERAL SURGERY DAILY PROGRESS NOTE:    Interval:  Epigastric pain, given protonix.    Subjective:  Patient seen and examined this am. +flatus, -BM     Exam:  Gen: NAD, resting in bed, alert and responding appropriately  Resp: Airway patent, non-labored respirations  Abd: Soft, distended, TTP (L>R), no rebound or guarding.   Neuro: AAOx3, no focal deficits    Vital Signs Last 24 Hrs  T(C): 36.8 (25 Jul 2019 02:31), Max: 37.1 (24 Jul 2019 10:14)  T(F): 98.2 (25 Jul 2019 02:31), Max: 98.7 (24 Jul 2019 10:14)  HR: 54 (25 Jul 2019 02:31) (54 - 68)  BP: 124/69 (25 Jul 2019 02:31) (124/69 - 145/91)  BP(mean): --  RR: 16 (25 Jul 2019 02:31) (16 - 18)  SpO2: 98% (25 Jul 2019 02:31) (98% - 100%)    07-24    142  |  104  |  12  ----------------------------<  103<H>  3.3<L>   |  25  |  1.15    Ca    9.3      24 Jul 2019 06:35  Phos  2.9     07-24  Mg     1.9     07-24                          11.6   6.84  )-----------( 296      ( 24 Jul 2019 06:35 )             36.6 GENERAL SURGERY DAILY PROGRESS NOTE:    Interval:  Epigastric pain, given protonix.    Subjective:  Patient seen and examined this am. +flatus, -BM     Exam:  Gen: NAD, resting in bed, alert and responding appropriately  Resp: Airway patent, non-labored respirations  Abd: Soft, distended, TTP (L>R), no rebound or guarding.   Neuro: AAOx3, no focal deficits    Vital Signs Last 24 Hrs  T(C): 36.9 (25 Jul 2019 10:18), Max: 36.9 (24 Jul 2019 14:00)  T(F): 98.4 (25 Jul 2019 10:18), Max: 98.5 (24 Jul 2019 14:00)  HR: 67 (25 Jul 2019 10:18) (54 - 68)  BP: 149/87 (25 Jul 2019 10:18) (122/71 - 149/87)  BP(mean): --  RR: 16 (25 Jul 2019 10:18) (16 - 18)  SpO2: 100% (25 Jul 2019 10:18) (98% - 100%)    07-25    143  |  108<H>  |  10  ----------------------------<  87  3.6   |  25  |  1.15    Ca    9.3      25 Jul 2019 06:50  Phos  2.1     07-25  Mg     1.9     07-25                          11.0   7.64  )-----------( 270      ( 25 Jul 2019 06:50 )             35.0

## 2019-07-25 NOTE — DISCHARGE NOTE PROVIDER - NSDCCPCAREPLAN_GEN_ALL_CORE_FT
PRINCIPAL DISCHARGE DIAGNOSIS  Diagnosis: Small bowel obstruction  Assessment and Plan of Treatment: 4/2016 tx with ngt

## 2019-07-25 NOTE — DISCHARGE NOTE PROVIDER - NSDCFUADDINST_GEN_ALL_CORE_FT
PAIN CONTROL: You may take 650 mg of Tylenol every 4-6 hours. Do not exceed 4 grams of Tylenol daily.    DIET: Return to your usual diet.    NOTIFY YOUR SURGEON IF:  fever (over 100.4 F) or chills, persistent nausea/vomiting, persistent diarrhea.    FOLLOW-UP:  1. Please call Dr. Mcdonough's office at [phone] to make a follow-up appointment within one week of discharge    2. Please follow up with your primary care physician in one week regarding your hospitalization.

## 2019-07-25 NOTE — PROGRESS NOTE ADULT - ASSESSMENT
64F with history of multiple abdominal surgeries and multiple adhesive SBOs in the past, all managed non operatively, presents with one day of abdominal pain.    Plan:  - Diet: Clears, if tolerating then regular today  - IV Tylenol  - F/u GI function  - dvt ppx: heparin     B team surgery  [id number] 64F with history of multiple abdominal surgeries and multiple adhesive SBOs in the past, all managed non operatively, presents with one day of abdominal pain.    Plan:  - Diet: Regular today  - IV Tylenol  - F/u GI function  - dvt ppx: heparin     B team surgery  [id number]

## 2019-07-25 NOTE — DISCHARGE NOTE PROVIDER - CARE PROVIDER_API CALL
Abel Mcdonough)  Surgery  [address], [address]  [place] [address]  Phone: [phone]  Fax: [phone]  Follow Up Time:

## 2019-07-25 NOTE — DISCHARGE NOTE PROVIDER - HOSPITAL COURSE
64F with history of multiple abdominal surgeries and multiple adhesive SBOs in the past, all managed non operatively, presents with one day of abdominal pain. As per patient, the pain woke her from sleep at 4am. Pain associated with burping and one episode of emesis on arrival to the ED. Pain mostly left sided in nature and cramping, worse with palpation.     Patient has had 9 SBOs in the past, all managed non operatively. She had a partial nephrectomy in 2007 and a hysterectomy in 2003.        07/23: WBC within normal limits. Plan: NPO. IV fluids, DVT prophylaxis, check lactate, no pain medication.         07/24: Advanced to clears and tolerating with +Flatus, +BM        07/25: discharge home        At the time of discharge, the patient was hemodynamically stable, was tolerating PO diet, was voiding urine and passing stool, was ambulating, and was comfortable with adequate pain control.

## 2019-07-25 NOTE — DISCHARGE NOTE NURSING/CASE MANAGEMENT/SOCIAL WORK - NSDCDPATPORTLINK_GEN_ALL_CORE
You can access the [facility], offered by [facility], by registering with the following website: [email or web address]

## 2019-07-27 ENCOUNTER — CHART COPY (OUTPATIENT)
Age: 64
End: 2019-07-27

## 2019-08-09 ENCOUNTER — APPOINTMENT (OUTPATIENT)
Dept: INTERNAL MEDICINE | Facility: CLINIC | Age: 64
End: 2019-08-09

## 2019-09-18 LAB
25(OH)D3 SERPL-MCNC: 38.3 NG/ML
ALBUMIN SERPL ELPH-MCNC: 4.1 G/DL
ALP BLD-CCNC: 59 U/L
ALT SERPL-CCNC: 18 U/L
ANION GAP SERPL CALC-SCNC: 21 MMOL/L
AST SERPL-CCNC: 28 U/L
BILIRUB SERPL-MCNC: 0.2 MG/DL
BUN SERPL-MCNC: 17 MG/DL
CALCIUM SERPL-MCNC: 9.7 MG/DL
CHLORIDE SERPL-SCNC: 102 MMOL/L
CHOLEST SERPL-MCNC: 180 MG/DL
CHOLEST/HDLC SERPL: 2.3 RATIO
CO2 SERPL-SCNC: 21 MMOL/L
CREAT SERPL-MCNC: 1.27 MG/DL
GLUCOSE SERPL-MCNC: 79 MG/DL
HDLC SERPL-MCNC: 80 MG/DL
LDLC SERPL CALC-MCNC: 85 MG/DL
POTASSIUM SERPL-SCNC: 4.1 MMOL/L
PROT SERPL-MCNC: 7.9 G/DL
SODIUM SERPL-SCNC: 144 MMOL/L
T4 FREE SERPL-MCNC: 1.8 NG/DL
TRIGL SERPL-MCNC: 76 MG/DL
TSH SERPL-ACNC: 0.8 UIU/ML

## 2019-09-25 NOTE — ED ADULT NURSE NOTE - CAS DISCH ACCOMP BY
Pt educated on plan of care. Call light in reach, side rails up x2, bed in lowest position, pt resting, no acute distress noted at this time.      Transporter

## 2019-10-09 NOTE — PATIENT PROFILE ADULT - DO YOU FEEL LIKE HURTING OTHERS?
DOWN SYNDROME    For more information about Down syndrome, please take a look at our Resource page on our website:    [email or web address]    Visual support tools and videos with actors with Down syndrome depicting health promoting activities are available as are resources for parents, caregivers, and  professionals, too.     Health screening Recommendations    Mental Health (annually)  Screened  Concerns as noted above    Alzheimer's disease (annually starting at age 40)  Screened  Negative    Type 2 DM  (BS or Hgb A1C 1-3; 1-2 if obese; starting age 30) Test ordered    Sleep apnea (history and physical) Screened  Concerns as noted above    Celiac (history and physical) Screened  prev neg testing (blood)    Thyroid (TSH reflex annually) Test ordered    Lipids (every 5 years starting age 40) Test not ordered    Stroke risk -(if congenital heart disease, refer to Cardiology) follow up with Cardiology    Obesity Obesity  Discussed    Cervical subluxation screening (annual history and physical exam) Screened  Negative     Immunizations  Annual flu- recommended (we had a refrigerator malfunction and don't have any immunizations to give today)  Td 2010  Tdap- next appt  prevnar- next appt  Hepatitis C screening ordered    Weight gain  Discussed nutrition and exercise      Mood disorder  Will add trileptal  Check labs first before start medication    Labs again in 2-3 weeks and then   Phone contact in 2-3 weeks to consider dose change      Obstructive sleep apnea- discussed cpap use  Discussed possible relationship to mood    Irritable bowel  -stable  -occasional incontinence    Gout  Labs ordered   no

## 2019-11-26 ENCOUNTER — APPOINTMENT (OUTPATIENT)
Dept: INTERNAL MEDICINE | Facility: CLINIC | Age: 64
End: 2019-11-26
Payer: COMMERCIAL

## 2019-11-26 VITALS
HEART RATE: 78 BPM | BODY MASS INDEX: 42.27 KG/M2 | SYSTOLIC BLOOD PRESSURE: 184 MMHG | OXYGEN SATURATION: 98 % | DIASTOLIC BLOOD PRESSURE: 92 MMHG | WEIGHT: 254 LBS

## 2019-11-26 LAB
BILIRUB UR QL STRIP: NORMAL
CLARITY UR: CLEAR
COLLECTION METHOD: NORMAL
GLUCOSE UR-MCNC: NORMAL
HCG UR QL: 0.2 EU/DL
HGB UR QL STRIP.AUTO: NORMAL
KETONES UR-MCNC: NORMAL
LEUKOCYTE ESTERASE UR QL STRIP: NORMAL
NITRITE UR QL STRIP: NORMAL
PH UR STRIP: 7
PROT UR STRIP-MCNC: NORMAL
SP GR UR STRIP: 1.01

## 2019-11-26 PROCEDURE — 81003 URINALYSIS AUTO W/O SCOPE: CPT | Mod: QW

## 2019-11-26 PROCEDURE — 99214 OFFICE O/P EST MOD 30 MIN: CPT | Mod: 25

## 2019-11-26 NOTE — PHYSICAL EXAM
[No Acute Distress] : no acute distress [Well Nourished] : well nourished [Well Developed] : well developed [Well-Appearing] : well-appearing [Soft] : abdomen soft [No HSM] : no HSM [Non-distended] : non-distended [Non Tender] : non-tender [No Masses] : no abdominal mass palpated [Normal Bowel Sounds] : normal bowel sounds [No CVA Tenderness] : no CVA  tenderness [No Spinal Tenderness] : no spinal tenderness [No Rash] : no rash

## 2019-11-26 NOTE — ASSESSMENT
[FreeTextEntry1] : 64-year-old female here with complaints of left flank pain which appear to be more musculoskeletal in nature and likely related to her vigorous exercise routine.  Given that 6 months has passed since the CAT scan she could have developed a kidney stone and may have hydronephrosis as the cause of the symptoms as well.  We will send her for  a renal US and will include doppler as her BP is high today but she was very anxious.  She admitted that she tends to take her BP meds qod because her BP is running very low - 110/70.  I told her that was perfect and that she needs to take her BP as directed.  F/U pending results.

## 2020-01-03 ENCOUNTER — APPOINTMENT (OUTPATIENT)
Dept: ORTHOPEDIC SURGERY | Facility: CLINIC | Age: 65
End: 2020-01-03

## 2020-01-10 ENCOUNTER — APPOINTMENT (OUTPATIENT)
Dept: ORTHOPEDIC SURGERY | Facility: CLINIC | Age: 65
End: 2020-01-10
Payer: COMMERCIAL

## 2020-01-10 VITALS
SYSTOLIC BLOOD PRESSURE: 124 MMHG | BODY MASS INDEX: 42.32 KG/M2 | HEART RATE: 83 BPM | WEIGHT: 254 LBS | HEIGHT: 65 IN | DIASTOLIC BLOOD PRESSURE: 78 MMHG

## 2020-01-10 DIAGNOSIS — Z96.652 PRESENCE OF LEFT ARTIFICIAL KNEE JOINT: ICD-10-CM

## 2020-01-10 PROCEDURE — 73562 X-RAY EXAM OF KNEE 3: CPT | Mod: 50

## 2020-01-10 PROCEDURE — 99213 OFFICE O/P EST LOW 20 MIN: CPT

## 2020-01-14 NOTE — CONSULT LETTER
[Dear  ___] : Dear  [unfilled], [Consult Letter:] : I had the pleasure of evaluating your patient, [unfilled]. [Please see my note below.] : Please see my note below. [Consult Closing:] : Thank you very much for allowing me to participate in the care of this patient.  If you have any questions, please do not hesitate to contact me. [Sincerely,] : Sincerely, [FreeTextEntry2] : MELVINA GREENBERG\par  [FreeTextEntry3] : Seun Estrella MD\par \par ______________________________________________\par [facility]: Hip/Knee Arthroplasty\par [address], [address], [place] [address]\par (t) [phone]\par (f) [phone]\par

## 2020-01-14 NOTE — DISCUSSION/SUMMARY
[de-identified] : s/p left total knee replacement, right knee DJD, with lumbar spine DJD, and obesity. \par Concerning her left knee, there is some demarcation along the lateral aspect of the tibial component, but the implant and her knee on exam remains stable. I have recommended for her to return to the office in six months for close follow up with additional radiographs. I have stressed weight loss, as this will lessen the load on her knees overall. \par She has been recommended to continue with vascular for treatment of varicose veins. \par She was recommended continued conservative management in the form of a home exercise program, activity modifications, stationary bicycling, swimming and weight loss program. She will continue with home exercise and workout in the gym. She notes improvement with her current treatment plan, and will continue. She does not require prescriptions at this time. \par Follow-up appointment was recommended in 6 months\par \par

## 2020-01-14 NOTE — PHYSICAL EXAM
[de-identified] : On general examination the patient is adequately groomed and nourished. The patient is morbidly obese. The vital parameters are as recorded. \par There is diffuse swelling of the lower extremities, with varicose veins. There is a region of varicose veins along the distal lateral thigh left sided, with no skin warmth/erythema/scars/swelling, no ulcers and no palpable lymph nodes or masses in both lower extremities. Bilateral pedal pulses are well palpable.\par Upper Extremity:\par Both right and left upper extremities are unremarkable in terms of skin rash, lesions, pigmentation, redness, tenderness, swelling, joint instability, abnormal deformity or crepitus. The overall range of motion, sensation, motor tone and strength testing are normal.\par \par Knee Exam:\par The gait is right stiff knee antalgic.\par Knee alignment:            Right 8 degrees varus with mild flexion deformity. \par Left neutral with no flexion deformity.\par The left knee demonstrates a well healed scar from total knee replacement, the right knee demonstrates no scars and the skin has no warmth, erythema, swelling or tenderness.\par Both knees have a range of motion of\par Extension:                    Right 0 degrees                        Left 0 degrees\par Flexion:                                   Right 110 degrees          Left 115 degrees\par Right Knee: There is medial joint line tenderness. There is mild effusion of the right knee. There is no effusion of the left knee. There is tenderness to palpation along the cluster of varicose veins along the distal lateral thigh left knee, along with tenderness along the medial aspect of the proximal left tibia.\par Right knee: Hannah's test is positive. Efrain test is positive.\par Lachman's test, Anterior/Posterior Drawer test and Pivot Shift Tests are negative. \par There is right knee grade 1 MCL mediolateral laxity and no anteroposterior instability. There is no laxity of the left knee. \par Patella compression test is negative and patellofemoral tracking is normal with no lateral subluxation, apprehension or instability. \par Right knee quadriceps and hamstrings power is 4+.\par Left knee quadriceps and hamstrings power is 4+. \par \par Hip Exam:\par The gait and station is normal\par The patient has equal leg lengths and no pelvic tilt. Satinder/Paramjit test is 7 inches on the right and 7 inches on the left. Active SLR is 60 degrees on the right and 60 degrees on the left. Both hips demonstrate no scars and the skin has no signs of inflammation or tenderness. \par Both Hips have a normal range of motion of flexion to 100 degrees, abduction 40 degrees, adduction 20 degrees, external rotation 40 degrees, internal rotation 20 degrees with symmetrical motion in flexion and extension. There is no flexion contracture, deformity or instability. Labral impingement tests are negative.\par Both hips flexor, abductor and extensor power is normal.\par  [de-identified] : The following radiographs were ordered and read by me during this patients visit. I reviewed each radiograph in detail with the patient and discussed the findings as highlighted below. \par AP, lateral and skyline views of the left knee taken today confirm a stable left total knee replacement. There is some mild demarcation along the lateral aspect of the tibial component, there is stable position of the tibial component, with no change in rotation when compared with previous imaging taken last visit. AP lateral skyline view of the right knee reveal medial joint line narrowing, osteophyte formation, and subchondral sclerosis.

## 2020-01-14 NOTE — HISTORY OF PRESENT ILLNESS
[Intermit.] : ~He/She~ states the symptoms seem to be intermittent [de-identified] : Ms. CODI TAVERAS is a 64 year old female presents s/p left total knee replacement 4/25/2016, for a follow up for bilateral knee pain.  She notes she has left knee pain continued, but her right knee has become pain free since losing about 30 lbs. She notes she has pain along the shin left LE, and lateral aspect of the left knee. She notes since she was last seen in June 2019, and was recommended for continued home exercises and weight loss.   She also has continued lower back pain that is chronic in nature. She denies current treatment for her lower back. \par She has not been doing any recent physical therapy, but has been working out in the gym.  [Stable] : stable [4] : a current pain level of 4/10 [Rest] : relieved by rest [Walking] : worsened by walking

## 2020-04-25 ENCOUNTER — INPATIENT (INPATIENT)
Facility: HOSPITAL | Age: 65
LOS: 4 days | Discharge: HOME | End: 2020-04-30
Attending: SPECIALIST | Admitting: SPECIALIST
Payer: COMMERCIAL

## 2020-04-26 VITALS
DIASTOLIC BLOOD PRESSURE: 84 MMHG | RESPIRATION RATE: 20 BRPM | SYSTOLIC BLOOD PRESSURE: 149 MMHG | OXYGEN SATURATION: 100 % | HEART RATE: 72 BPM | TEMPERATURE: 98 F

## 2020-04-26 DIAGNOSIS — K56.609 UNSPECIFIED INTESTINAL OBSTRUCTION, UNSPECIFIED AS TO PARTIAL VERSUS COMPLETE OBSTRUCTION: ICD-10-CM

## 2020-04-26 LAB
ALBUMIN SERPL ELPH-MCNC: 3.9 G/DL — SIGNIFICANT CHANGE UP (ref 3.3–5)
ALP SERPL-CCNC: 55 U/L — SIGNIFICANT CHANGE UP (ref 40–120)
ALT FLD-CCNC: 21 U/L — SIGNIFICANT CHANGE UP (ref 4–33)
ANION GAP SERPL CALC-SCNC: 10 MMO/L — SIGNIFICANT CHANGE UP (ref 7–14)
ANION GAP SERPL CALC-SCNC: 11 MMO/L — SIGNIFICANT CHANGE UP (ref 7–14)
ANION GAP SERPL CALC-SCNC: 14 MMO/L — SIGNIFICANT CHANGE UP (ref 7–14)
APTT BLD: 31.9 SEC — SIGNIFICANT CHANGE UP (ref 27.5–36.3)
AST SERPL-CCNC: 50 U/L — HIGH (ref 4–32)
BASE EXCESS BLDV CALC-SCNC: 0.9 MMOL/L — SIGNIFICANT CHANGE UP
BASOPHILS # BLD AUTO: 0.02 K/UL — SIGNIFICANT CHANGE UP (ref 0–0.2)
BASOPHILS NFR BLD AUTO: 0.2 % — SIGNIFICANT CHANGE UP (ref 0–2)
BILIRUB SERPL-MCNC: 0.2 MG/DL — SIGNIFICANT CHANGE UP (ref 0.2–1.2)
BLD GP AB SCN SERPL QL: NEGATIVE — SIGNIFICANT CHANGE UP
BLOOD GAS VENOUS - CREATININE: SIGNIFICANT CHANGE UP MG/DL (ref 0.5–1.3)
BLOOD GAS VENOUS - FIO2: 21 — SIGNIFICANT CHANGE UP
BUN SERPL-MCNC: 16 MG/DL — SIGNIFICANT CHANGE UP (ref 7–23)
BUN SERPL-MCNC: 17 MG/DL — SIGNIFICANT CHANGE UP (ref 7–23)
BUN SERPL-MCNC: 18 MG/DL — SIGNIFICANT CHANGE UP (ref 7–23)
CALCIUM SERPL-MCNC: 10.2 MG/DL — SIGNIFICANT CHANGE UP (ref 8.4–10.5)
CALCIUM SERPL-MCNC: 9.6 MG/DL — SIGNIFICANT CHANGE UP (ref 8.4–10.5)
CALCIUM SERPL-MCNC: 9.8 MG/DL — SIGNIFICANT CHANGE UP (ref 8.4–10.5)
CHLORIDE BLDV-SCNC: 109 MMOL/L — HIGH (ref 96–108)
CHLORIDE SERPL-SCNC: 102 MMOL/L — SIGNIFICANT CHANGE UP (ref 98–107)
CHLORIDE SERPL-SCNC: 103 MMOL/L — SIGNIFICANT CHANGE UP (ref 98–107)
CHLORIDE SERPL-SCNC: 105 MMOL/L — SIGNIFICANT CHANGE UP (ref 98–107)
CO2 SERPL-SCNC: 25 MMOL/L — SIGNIFICANT CHANGE UP (ref 22–31)
CO2 SERPL-SCNC: 25 MMOL/L — SIGNIFICANT CHANGE UP (ref 22–31)
CO2 SERPL-SCNC: 27 MMOL/L — SIGNIFICANT CHANGE UP (ref 22–31)
CREAT SERPL-MCNC: 1 MG/DL — SIGNIFICANT CHANGE UP (ref 0.5–1.3)
CREAT SERPL-MCNC: 1.04 MG/DL — SIGNIFICANT CHANGE UP (ref 0.5–1.3)
CREAT SERPL-MCNC: 1.08 MG/DL — SIGNIFICANT CHANGE UP (ref 0.5–1.3)
EOSINOPHIL # BLD AUTO: 0.04 K/UL — SIGNIFICANT CHANGE UP (ref 0–0.5)
EOSINOPHIL NFR BLD AUTO: 0.4 % — SIGNIFICANT CHANGE UP (ref 0–6)
GAS PNL BLDV: 139 MMOL/L — SIGNIFICANT CHANGE UP (ref 136–146)
GLUCOSE BLDV-MCNC: 88 MG/DL — SIGNIFICANT CHANGE UP (ref 70–99)
GLUCOSE SERPL-MCNC: 115 MG/DL — HIGH (ref 70–99)
GLUCOSE SERPL-MCNC: 117 MG/DL — HIGH (ref 70–99)
GLUCOSE SERPL-MCNC: 92 MG/DL — SIGNIFICANT CHANGE UP (ref 70–99)
HCO3 BLDV-SCNC: 25 MMOL/L — SIGNIFICANT CHANGE UP (ref 20–27)
HCT VFR BLD CALC: 36 % — SIGNIFICANT CHANGE UP (ref 34.5–45)
HCT VFR BLD CALC: 37.5 % — SIGNIFICANT CHANGE UP (ref 34.5–45)
HCT VFR BLDV CALC: 28.9 % — LOW (ref 34.5–45)
HGB BLD-MCNC: 11.4 G/DL — LOW (ref 11.5–15.5)
HGB BLD-MCNC: 12.1 G/DL — SIGNIFICANT CHANGE UP (ref 11.5–15.5)
HGB BLDV-MCNC: 9.3 G/DL — LOW (ref 11.5–15.5)
IMM GRANULOCYTES NFR BLD AUTO: 0.2 % — SIGNIFICANT CHANGE UP (ref 0–1.5)
INR BLD: 1.07 — SIGNIFICANT CHANGE UP (ref 0.88–1.17)
LACTATE BLDV-MCNC: 2 MMOL/L — SIGNIFICANT CHANGE UP (ref 0.5–2)
LYMPHOCYTES # BLD AUTO: 1.28 K/UL — SIGNIFICANT CHANGE UP (ref 1–3.3)
LYMPHOCYTES # BLD AUTO: 13.6 % — SIGNIFICANT CHANGE UP (ref 13–44)
MAGNESIUM SERPL-MCNC: 1.9 MG/DL — SIGNIFICANT CHANGE UP (ref 1.6–2.6)
MCHC RBC-ENTMCNC: 30 PG — SIGNIFICANT CHANGE UP (ref 27–34)
MCHC RBC-ENTMCNC: 30.7 PG — SIGNIFICANT CHANGE UP (ref 27–34)
MCHC RBC-ENTMCNC: 31.7 % — LOW (ref 32–36)
MCHC RBC-ENTMCNC: 32.3 % — SIGNIFICANT CHANGE UP (ref 32–36)
MCV RBC AUTO: 94.7 FL — SIGNIFICANT CHANGE UP (ref 80–100)
MCV RBC AUTO: 95.2 FL — SIGNIFICANT CHANGE UP (ref 80–100)
MONOCYTES # BLD AUTO: 0.49 K/UL — SIGNIFICANT CHANGE UP (ref 0–0.9)
MONOCYTES NFR BLD AUTO: 5.2 % — SIGNIFICANT CHANGE UP (ref 2–14)
NEUTROPHILS # BLD AUTO: 7.57 K/UL — HIGH (ref 1.8–7.4)
NEUTROPHILS NFR BLD AUTO: 80.4 % — HIGH (ref 43–77)
NRBC # FLD: 0 K/UL — SIGNIFICANT CHANGE UP (ref 0–0)
NRBC # FLD: 0 K/UL — SIGNIFICANT CHANGE UP (ref 0–0)
PCO2 BLDV: 35 MMHG — LOW (ref 41–51)
PH BLDV: 7.46 PH — HIGH (ref 7.32–7.43)
PHOSPHATE SERPL-MCNC: 3.3 MG/DL — SIGNIFICANT CHANGE UP (ref 2.5–4.5)
PLATELET # BLD AUTO: 294 K/UL — SIGNIFICANT CHANGE UP (ref 150–400)
PLATELET # BLD AUTO: 326 K/UL — SIGNIFICANT CHANGE UP (ref 150–400)
PMV BLD: 11 FL — SIGNIFICANT CHANGE UP (ref 7–13)
PMV BLD: 11.5 FL — SIGNIFICANT CHANGE UP (ref 7–13)
PO2 BLDV: 43 MMHG — HIGH (ref 35–40)
POTASSIUM BLDV-SCNC: 5.2 MMOL/L — HIGH (ref 3.4–4.5)
POTASSIUM SERPL-MCNC: 3.5 MMOL/L — SIGNIFICANT CHANGE UP (ref 3.5–5.3)
POTASSIUM SERPL-MCNC: 3.8 MMOL/L — SIGNIFICANT CHANGE UP (ref 3.5–5.3)
POTASSIUM SERPL-MCNC: 5.9 MMOL/L — HIGH (ref 3.5–5.3)
POTASSIUM SERPL-SCNC: 3.5 MMOL/L — SIGNIFICANT CHANGE UP (ref 3.5–5.3)
POTASSIUM SERPL-SCNC: 3.8 MMOL/L — SIGNIFICANT CHANGE UP (ref 3.5–5.3)
POTASSIUM SERPL-SCNC: 5.9 MMOL/L — HIGH (ref 3.5–5.3)
PROT SERPL-MCNC: 9.1 G/DL — HIGH (ref 6–8.3)
PROTHROM AB SERPL-ACNC: 12.2 SEC — SIGNIFICANT CHANGE UP (ref 9.8–13.1)
RBC # BLD: 3.8 M/UL — SIGNIFICANT CHANGE UP (ref 3.8–5.2)
RBC # BLD: 3.94 M/UL — SIGNIFICANT CHANGE UP (ref 3.8–5.2)
RBC # FLD: 14.3 % — SIGNIFICANT CHANGE UP (ref 10.3–14.5)
RBC # FLD: 14.4 % — SIGNIFICANT CHANGE UP (ref 10.3–14.5)
RH IG SCN BLD-IMP: POSITIVE — SIGNIFICANT CHANGE UP
SAO2 % BLDV: 88.5 % — HIGH (ref 60–85)
SODIUM SERPL-SCNC: 140 MMOL/L — SIGNIFICANT CHANGE UP (ref 135–145)
SODIUM SERPL-SCNC: 141 MMOL/L — SIGNIFICANT CHANGE UP (ref 135–145)
SODIUM SERPL-SCNC: 141 MMOL/L — SIGNIFICANT CHANGE UP (ref 135–145)
WBC # BLD: 6.91 K/UL — SIGNIFICANT CHANGE UP (ref 3.8–10.5)
WBC # BLD: 9.42 K/UL — SIGNIFICANT CHANGE UP (ref 3.8–10.5)
WBC # FLD AUTO: 6.91 K/UL — SIGNIFICANT CHANGE UP (ref 3.8–10.5)
WBC # FLD AUTO: 9.42 K/UL — SIGNIFICANT CHANGE UP (ref 3.8–10.5)

## 2020-04-26 PROCEDURE — 74177 CT ABD & PELVIS W/CONTRAST: CPT | Mod: 26

## 2020-04-26 PROCEDURE — 71045 X-RAY EXAM CHEST 1 VIEW: CPT | Mod: 26

## 2020-04-26 PROCEDURE — 74018 RADEX ABDOMEN 1 VIEW: CPT | Mod: 26

## 2020-04-26 RX ORDER — PANTOPRAZOLE SODIUM 20 MG/1
40 TABLET, DELAYED RELEASE ORAL DAILY
Refills: 0 | Status: DISCONTINUED | OUTPATIENT
Start: 2020-04-26 | End: 2020-04-28

## 2020-04-26 RX ORDER — ACETAMINOPHEN 500 MG
1000 TABLET ORAL ONCE
Refills: 0 | Status: COMPLETED | OUTPATIENT
Start: 2020-04-26 | End: 2020-04-26

## 2020-04-26 RX ORDER — ONDANSETRON 8 MG/1
4 TABLET, FILM COATED ORAL ONCE
Refills: 0 | Status: COMPLETED | OUTPATIENT
Start: 2020-04-26 | End: 2020-04-26

## 2020-04-26 RX ORDER — POTASSIUM CHLORIDE 20 MEQ
10 PACKET (EA) ORAL
Refills: 0 | Status: COMPLETED | OUTPATIENT
Start: 2020-04-26 | End: 2020-04-26

## 2020-04-26 RX ORDER — MORPHINE SULFATE 50 MG/1
4 CAPSULE, EXTENDED RELEASE ORAL ONCE
Refills: 0 | Status: DISCONTINUED | OUTPATIENT
Start: 2020-04-26 | End: 2020-04-26

## 2020-04-26 RX ORDER — ACETAMINOPHEN 500 MG
1000 TABLET ORAL ONCE
Refills: 0 | Status: DISCONTINUED | OUTPATIENT
Start: 2020-04-26 | End: 2020-04-26

## 2020-04-26 RX ORDER — SODIUM CHLORIDE 9 MG/ML
1000 INJECTION, SOLUTION INTRAVENOUS
Refills: 0 | Status: DISCONTINUED | OUTPATIENT
Start: 2020-04-26 | End: 2020-04-28

## 2020-04-26 RX ORDER — SODIUM CHLORIDE 9 MG/ML
1000 INJECTION INTRAMUSCULAR; INTRAVENOUS; SUBCUTANEOUS ONCE
Refills: 0 | Status: COMPLETED | OUTPATIENT
Start: 2020-04-26 | End: 2020-04-26

## 2020-04-26 RX ORDER — ENOXAPARIN SODIUM 100 MG/ML
40 INJECTION SUBCUTANEOUS EVERY 12 HOURS
Refills: 0 | Status: DISCONTINUED | OUTPATIENT
Start: 2020-04-26 | End: 2020-04-30

## 2020-04-26 RX ORDER — DIATRIZOATE MEGLUMINE 180 MG/ML
120 INJECTION, SOLUTION INTRAVESICAL ONCE
Refills: 0 | Status: COMPLETED | OUTPATIENT
Start: 2020-04-26 | End: 2020-04-26

## 2020-04-26 RX ORDER — ENOXAPARIN SODIUM 100 MG/ML
40 INJECTION SUBCUTANEOUS ONCE
Refills: 0 | Status: COMPLETED | OUTPATIENT
Start: 2020-04-26 | End: 2020-04-26

## 2020-04-26 RX ORDER — HYDROMORPHONE HYDROCHLORIDE 2 MG/ML
0.5 INJECTION INTRAMUSCULAR; INTRAVENOUS; SUBCUTANEOUS ONCE
Refills: 0 | Status: DISCONTINUED | OUTPATIENT
Start: 2020-04-26 | End: 2020-04-26

## 2020-04-26 RX ORDER — LEVOTHYROXINE SODIUM 125 MCG
100 TABLET ORAL AT BEDTIME
Refills: 0 | Status: DISCONTINUED | OUTPATIENT
Start: 2020-04-26 | End: 2020-04-28

## 2020-04-26 RX ADMIN — Medication 100 MILLIEQUIVALENT(S): at 13:55

## 2020-04-26 RX ADMIN — SODIUM CHLORIDE 100 MILLILITER(S): 9 INJECTION, SOLUTION INTRAVENOUS at 06:56

## 2020-04-26 RX ADMIN — ENOXAPARIN SODIUM 40 MILLIGRAM(S): 100 INJECTION SUBCUTANEOUS at 17:26

## 2020-04-26 RX ADMIN — SODIUM CHLORIDE 100 MILLILITER(S): 9 INJECTION, SOLUTION INTRAVENOUS at 09:19

## 2020-04-26 RX ADMIN — Medication 400 MILLIGRAM(S): at 03:41

## 2020-04-26 RX ADMIN — SODIUM CHLORIDE 1000 MILLILITER(S): 9 INJECTION INTRAMUSCULAR; INTRAVENOUS; SUBCUTANEOUS at 05:45

## 2020-04-26 RX ADMIN — PANTOPRAZOLE SODIUM 40 MILLIGRAM(S): 20 TABLET, DELAYED RELEASE ORAL at 21:27

## 2020-04-26 RX ADMIN — HYDROMORPHONE HYDROCHLORIDE 0.5 MILLIGRAM(S): 2 INJECTION INTRAMUSCULAR; INTRAVENOUS; SUBCUTANEOUS at 17:26

## 2020-04-26 RX ADMIN — Medication 1000 MILLIGRAM(S): at 04:15

## 2020-04-26 RX ADMIN — MORPHINE SULFATE 4 MILLIGRAM(S): 50 CAPSULE, EXTENDED RELEASE ORAL at 06:59

## 2020-04-26 RX ADMIN — Medication 100 MILLIEQUIVALENT(S): at 11:18

## 2020-04-26 RX ADMIN — MORPHINE SULFATE 4 MILLIGRAM(S): 50 CAPSULE, EXTENDED RELEASE ORAL at 05:35

## 2020-04-26 RX ADMIN — ONDANSETRON 4 MILLIGRAM(S): 8 TABLET, FILM COATED ORAL at 03:41

## 2020-04-26 RX ADMIN — Medication 100 MICROGRAM(S): at 21:27

## 2020-04-26 RX ADMIN — DIATRIZOATE MEGLUMINE 120 MILLILITER(S): 180 INJECTION, SOLUTION INTRAVESICAL at 13:56

## 2020-04-26 RX ADMIN — ENOXAPARIN SODIUM 40 MILLIGRAM(S): 100 INJECTION SUBCUTANEOUS at 06:56

## 2020-04-26 RX ADMIN — SODIUM CHLORIDE 1000 MILLILITER(S): 9 INJECTION INTRAMUSCULAR; INTRAVENOUS; SUBCUTANEOUS at 03:41

## 2020-04-26 NOTE — H&P ADULT - NSHPPHYSICALEXAM_GEN_ALL_CORE
General: Well developed, well nourished, and appears to be in no acute distress.  HEENT: normocephalic   Chest: non-labored breathing, no wheezing or rhonci  Cardiac: Regular rhythm, rate of 90  Abdomen: soft, obese, mild mid-abdominal tenderness since resolved General: Well developed, well nourished, and appears to be in no acute distress.  HEENT: normocephalic   Chest: non-labored breathing, no wheezing or rhonci  Cardiac: Regular rhythm, rate of 90  Abdomen: soft, obese, mild mid-abdominal tenderness since resolved, no rebound/guarding

## 2020-04-26 NOTE — ED ADULT TRIAGE NOTE - CHIEF COMPLAINT QUOTE
Pt with a pmhx of multiple abd surgeries and adhesive sbo's presents with c/o abd pain, nausea and vomiting. Last bowel movement yesterday.

## 2020-04-26 NOTE — ED PROVIDER NOTE - ABDOMINAL TENDER
epigastric/left upper quadrant/right lower quadrant/periumbilical/left lower quadrant/right upper quadrant/umbilical

## 2020-04-26 NOTE — ED PROVIDER NOTE - OBJECTIVE STATEMENT
64 y/o F with h/o hypothyroidism, HTN, multiple previous abd surgeries (, hysterectomy, partial nephrectomy, ex-lap) with mult previous SBOs here with 1 day of abdominal pain.  Pt reports diffuse pain similar to previous SBOs.  (+)nausea and nbnb vomiting, inability to tolerate any food or drink since 5pm.  (+)constipation and not passing flatus all day.    No fever, chills, urinary sxs, cough, sob, cp.

## 2020-04-26 NOTE — ED PROVIDER NOTE - CLINICAL SUMMARY MEDICAL DECISION MAKING FREE TEXT BOX
64 y/o F with h/o HTN, hypothyroidism with mult previous abd surgeries with 1 day of abd pain, n/v.  High suspicion for SBO - will give antiemetics and pain meds, ivfs, labs, ekg, ct abd/pel, poss surgery eval, reassess.

## 2020-04-26 NOTE — H&P ADULT - ASSESSMENT
63F h/o multiple prior adhesive SBOs all managed non-operatively p/w 24 hours of progressively worsening abdominal pain, nausea and CT imaging showing a small bowel obstruction.       - patient reports symptomatic improvement - decreased abdominal pain and distension - following placement of NGT. Patient is afebrile, non-leukocytotic, hemodynamically stable and no signs of peritonitis. No anion gap, bicarb within normal limits. Remains making urine.   - admit to surgical service A Team under Dr. Abel Mcdonough  - NGT to low continuous wall suction  - restart home medications as indicated  - DVT ppx w/Lovenox  - will place Gastrograffin down NGT to assist in non-operative resolution of SBO  - above plan d/w Dr. Ceasar Stapleton [facility] PGY4  General Surgery  [id number] 63F h/o multiple prior adhesive SBOs all managed non-operatively p/w 24 hours of progressively worsening abdominal pain, nausea and CT imaging showing a small bowel obstruction.       - patient reports symptomatic improvement - decreased abdominal pain and distension - following placement of NGT. Patient is afebrile, non-leukocytotic, hemodynamically stable and no signs of peritonitis. No anion gap, bicarb within normal limits. Remains making urine.   - admit to Dr. Abel Mcdonough  - NGT to low continuous wall suction  - restart home medications as indicated  - DVT ppx w/Lovenox  - will place Gastrograffin down NGT to assist in non-operative resolution of SBO  - above plan d/w Dr. Ceasar Stapleton [facility] PGY4  General Surgery  [id number]

## 2020-04-26 NOTE — H&P ADULT - ATTENDING COMMENTS
Above noted.   Agree with the assessment and planned treatment. Above noted. History obtained, the patient was examined.  The patient is known to me from prior admissions for the same diagnosis.  Developed abdominal pain, nausea, emesis yesterday. Had pain this afternoon requiring pain medication.  Physical Exam: Vital signs are stable. Afebrile.  Abdomen: Soft, tender over the james-umbilical region.  Labs: As charted.  CT Scan: Consistent with a small bowel obstruction.  Plan: NPO. NG suction, IV fluids, DVT prophylaxis. Test for Covid 19.

## 2020-04-26 NOTE — H&P ADULT - NSHPREVIEWOFSYSTEMS_GEN_ALL_CORE
General: denies weight change, fever or fatigue  HEENT: denies sore throat, hoarseness  Respiratory: denies cough, shortness of breath   Cardiovascular: denies chest pain, abnormal heart rhythm   Gastrointestinal: see HPI

## 2020-04-26 NOTE — ED ADULT NURSE REASSESSMENT NOTE - NS ED NURSE REASSESS COMMENT FT1
Patient resting at this time. States abdominal pain is 6/10 at present. Medicated as per orders. Respirations even and unlabored. 220 mL output noted to NG tube suction cannister. Vs as noted. Repeat labs sent. Will continue to monitor.

## 2020-04-26 NOTE — H&P ADULT - HISTORY OF PRESENT ILLNESS
General Surgery  CC: small bowel obstruction  HPI: 63F h/o laparoscopic partial nephrectomy / KANDY c/b multiple prior adhesive SBOs all managed non-operatively p/w 24 hours of progressively worsening abdominal pain, nausea and CT imaging showing a small bowel obstruction. Last flatus and BM were 24 hours prior when the pain start. NGT placed in the ED without ~300cc of gastric contents. Patient is afebrile, non-leukocytotic, hemodynamically stable and no signs of peritonitis. No anion gap, bicarb within normal limits. Remains making urine.     Patient denies fevers / chills, shortness of breath / cough.    Medical and Surgical History  Osteoarthritis  Small bowel obstruction: 2016 tx with ngt  Adult hypothyroidism: s/p radioactive iodine for grave&#x27;s,   History of kidney cancer: s/p partial nephrectomy, denies chemo and radiation  Hypertension  H/O partial nephrectomy: left   H/O  section:   H/O abdominal hysterectomy:  General Surgery  CC: small bowel obstruction  HPI: 63F h/o laparoscopic partial nephrectomy / KANDY c/b multiple prior adhesive SBOs all managed non-operatively p/w 24 hours of progressively worsening abdominal pain, nausea and vomiting; CT imaging showing a small bowel obstruction. Last flatus and BM were 24 hours prior when the pain start. NGT placed in the ED without ~300cc of gastric contents. Patient is afebrile, non-leukocytotic, hemodynamically stable and no signs of peritonitis. No anion gap, bicarb within normal limits. Remains making urine.     Patient denies fevers / chills, shortness of breath / cough.    Medical and Surgical History  Osteoarthritis  Small bowel obstruction: 2016 tx with ngt  Adult hypothyroidism: s/p radioactive iodine for grave&#x27;s,   History of kidney cancer: s/p partial nephrectomy, denies chemo and radiation  Hypertension  H/O partial nephrectomy: left   H/O  section:   H/O abdominal hysterectomy:

## 2020-04-26 NOTE — H&P ADULT - NSHPLABSRESULTS_GEN_ALL_CORE
LABS:                        12.1   9.42  )-----------( 326      ( 26 Apr 2020 03:20 )             37.5     04-26    140  |  103  |  18  ----------------------------<  92  5.9<H>   |  27  |  1.04    Ca    10.2      26 Apr 2020 03:20    TPro  9.1<H>  /  Alb  3.9  /  TBili  0.2  /  DBili  x   /  AST  50<H>  /  ALT  21  /  AlkPhos  55  04-26    PT/INR - ( 26 Apr 2020 03:20 )   PT: 12.2 SEC;   INR: 1.07          PTT - ( 26 Apr 2020 03:20 )  PTT:31.9 SEC

## 2020-04-26 NOTE — ED ADULT NURSE NOTE - OBJECTIVE STATEMENT
Patient received to room 3, A&Ox4, ambulatory, c/o abdominal pain for one day. PMH SBO, states it feels similar to previous episodes. Abdomen soft, nondistended, tender on palpation. States last BM was yesterday. Reports nausea/vomiting, denies diarrhea. Denies hematuria/dysuria. 20 G IV placed to L hand, labs drawn and sent. MD at bedside. VS as noted. Awaiting further orders, will continue to monitor. Patient received to room 3, A&Ox4, ambulatory, c/o abdominal pain for one day. PMH SBO, states it feels similar to previous episodes. Abdomen soft, nondistended, tender on palpation. States last BM was yesterday. Reports nausea/vomiting, denies diarrhea. Denies hematuria/dysuria. Respirations even and unlabored. Denies CP/SOB/cough, no fevers/chills. 20 G IV placed to L hand, labs drawn and sent. MD at bedside. VS as noted. Awaiting further orders, will continue to monitor.

## 2020-04-27 LAB
ANION GAP SERPL CALC-SCNC: 13 MMO/L — SIGNIFICANT CHANGE UP (ref 7–14)
BUN SERPL-MCNC: 14 MG/DL — SIGNIFICANT CHANGE UP (ref 7–23)
CALCIUM SERPL-MCNC: 9.6 MG/DL — SIGNIFICANT CHANGE UP (ref 8.4–10.5)
CHLORIDE SERPL-SCNC: 103 MMOL/L — SIGNIFICANT CHANGE UP (ref 98–107)
CO2 SERPL-SCNC: 26 MMOL/L — SIGNIFICANT CHANGE UP (ref 22–31)
CREAT SERPL-MCNC: 1.07 MG/DL — SIGNIFICANT CHANGE UP (ref 0.5–1.3)
GLUCOSE SERPL-MCNC: 116 MG/DL — HIGH (ref 70–99)
HCT VFR BLD CALC: 37.2 % — SIGNIFICANT CHANGE UP (ref 34.5–45)
HGB BLD-MCNC: 12.5 G/DL — SIGNIFICANT CHANGE UP (ref 11.5–15.5)
MAGNESIUM SERPL-MCNC: 1.8 MG/DL — SIGNIFICANT CHANGE UP (ref 1.6–2.6)
MCHC RBC-ENTMCNC: 31.5 PG — SIGNIFICANT CHANGE UP (ref 27–34)
MCHC RBC-ENTMCNC: 33.6 % — SIGNIFICANT CHANGE UP (ref 32–36)
MCV RBC AUTO: 93.7 FL — SIGNIFICANT CHANGE UP (ref 80–100)
NRBC # FLD: 0 K/UL — SIGNIFICANT CHANGE UP (ref 0–0)
PHOSPHATE SERPL-MCNC: 3.6 MG/DL — SIGNIFICANT CHANGE UP (ref 2.5–4.5)
PLATELET # BLD AUTO: 331 K/UL — SIGNIFICANT CHANGE UP (ref 150–400)
PMV BLD: 11.5 FL — SIGNIFICANT CHANGE UP (ref 7–13)
POTASSIUM SERPL-MCNC: 3.5 MMOL/L — SIGNIFICANT CHANGE UP (ref 3.5–5.3)
POTASSIUM SERPL-SCNC: 3.5 MMOL/L — SIGNIFICANT CHANGE UP (ref 3.5–5.3)
RBC # BLD: 3.97 M/UL — SIGNIFICANT CHANGE UP (ref 3.8–5.2)
RBC # FLD: 14.3 % — SIGNIFICANT CHANGE UP (ref 10.3–14.5)
SARS-COV-2 RNA SPEC QL NAA+PROBE: SIGNIFICANT CHANGE UP
SODIUM SERPL-SCNC: 142 MMOL/L — SIGNIFICANT CHANGE UP (ref 135–145)
WBC # BLD: 6.34 K/UL — SIGNIFICANT CHANGE UP (ref 3.8–10.5)
WBC # FLD AUTO: 6.34 K/UL — SIGNIFICANT CHANGE UP (ref 3.8–10.5)

## 2020-04-27 PROCEDURE — 74018 RADEX ABDOMEN 1 VIEW: CPT | Mod: 26

## 2020-04-27 PROCEDURE — 74018 RADEX ABDOMEN 1 VIEW: CPT | Mod: 26,59,77

## 2020-04-27 PROCEDURE — 74019 RADEX ABDOMEN 2 VIEWS: CPT | Mod: 26

## 2020-04-27 RX ORDER — ONDANSETRON 8 MG/1
4 TABLET, FILM COATED ORAL ONCE
Refills: 0 | Status: COMPLETED | OUTPATIENT
Start: 2020-04-27 | End: 2020-04-27

## 2020-04-27 RX ORDER — ACETAMINOPHEN 500 MG
650 TABLET ORAL ONCE
Refills: 0 | Status: COMPLETED | OUTPATIENT
Start: 2020-04-27 | End: 2020-04-27

## 2020-04-27 RX ORDER — MAGNESIUM SULFATE 500 MG/ML
2 VIAL (ML) INJECTION ONCE
Refills: 0 | Status: COMPLETED | OUTPATIENT
Start: 2020-04-27 | End: 2020-04-27

## 2020-04-27 RX ORDER — POTASSIUM CHLORIDE 20 MEQ
20 PACKET (EA) ORAL
Refills: 0 | Status: DISCONTINUED | OUTPATIENT
Start: 2020-04-27 | End: 2020-04-27

## 2020-04-27 RX ORDER — TETRACAINE/BENZOCAINE/BUTAMBEN 2%-14%-2%
1 OINTMENT (GRAM) TOPICAL DAILY
Refills: 0 | Status: DISCONTINUED | OUTPATIENT
Start: 2020-04-27 | End: 2020-04-28

## 2020-04-27 RX ORDER — LIDOCAINE 4 G/100G
1 CREAM TOPICAL ONCE
Refills: 0 | Status: COMPLETED | OUTPATIENT
Start: 2020-04-27 | End: 2020-04-27

## 2020-04-27 RX ORDER — POTASSIUM CHLORIDE 20 MEQ
10 PACKET (EA) ORAL
Refills: 0 | Status: COMPLETED | OUTPATIENT
Start: 2020-04-27 | End: 2020-04-27

## 2020-04-27 RX ADMIN — Medication 100 MICROGRAM(S): at 22:30

## 2020-04-27 RX ADMIN — ENOXAPARIN SODIUM 40 MILLIGRAM(S): 100 INJECTION SUBCUTANEOUS at 18:00

## 2020-04-27 RX ADMIN — PANTOPRAZOLE SODIUM 40 MILLIGRAM(S): 20 TABLET, DELAYED RELEASE ORAL at 13:50

## 2020-04-27 RX ADMIN — Medication 100 MILLIEQUIVALENT(S): at 13:50

## 2020-04-27 RX ADMIN — ENOXAPARIN SODIUM 40 MILLIGRAM(S): 100 INJECTION SUBCUTANEOUS at 05:06

## 2020-04-27 RX ADMIN — Medication 50 GRAM(S): at 09:57

## 2020-04-27 RX ADMIN — ONDANSETRON 4 MILLIGRAM(S): 8 TABLET, FILM COATED ORAL at 01:15

## 2020-04-27 RX ADMIN — SODIUM CHLORIDE 100 MILLILITER(S): 9 INJECTION, SOLUTION INTRAVENOUS at 09:57

## 2020-04-27 RX ADMIN — Medication 260 MILLIGRAM(S): at 22:30

## 2020-04-27 RX ADMIN — Medication 100 MILLIEQUIVALENT(S): at 18:00

## 2020-04-27 NOTE — PROGRESS NOTE ADULT - ASSESSMENT
63F h/o multiple prior adhesive SBOs all managed non-operatively p/w 24 hours of progressively worsening abdominal pain, nausea and CT imaging showing a small bowel obstruction. Failed gastrograffin challenge 4/26    - failed gastrograffin challenge 4/26  - NPO/NGT/IVF  - holding home BP meds  - replete electrolytes prn  - monitor vitals/i&o's  - DVT ppx w/Lovenox    General Surgery  [id number] 63F h/o multiple prior adhesive SBOs all managed non-operatively p/w 24 hours of progressively worsening abdominal pain, nausea and CT imaging showing a small bowel obstruction. Failed gastrograffin challenge 4/26    - failed gastrograffin challenge 4/26  - NPO/NGT/IVF  - holding home BP meds  - c/w home levothyroxine  - replete electrolytes prn  - monitor vitals/i&o's  - DVT ppx w/Lovenox    General Surgery  [id number]

## 2020-04-27 NOTE — PROGRESS NOTE ADULT - SUBJECTIVE AND OBJECTIVE BOX
GENERAL SURGERY PROGRESS NOTE    SUBJECTIVE  Patient seen and examined.     Failed gastrograffin challenge: 12h after 120cc of gastrograffin given via NGT and clamped, contrast still seen within stomach @12AM abdominal XR  @12:30am pt c/o nausea and ab pain x 2 hours (patient did not tell the RN until 12:30am). [facility] appeared uncomfortable, and Abdomen was soft, nondistended, and TTP in epigastric region with no rebound or guarding. NGT placed back to [facility], 400cc of clear gray/light green nonbilious fluid came out in 5 mins, and pain and nausea immediately improved. still c/o headache  zofran x 1 given  @ 1:15am ***    This AM, NPO/NGT without nausea, vomiting, -/-, voiding without issues, have been ambulating and out of bed. Denies fever, chills, SOB, chest pain.         OBJECTIVE    PHYSICAL EXAM  General: Appears well, NAD, NGT  CHEST: breathing comfortably  CV: appears well perfused  Abdomen: soft, nontender, nondistended, no rebound or guarding  Extremities: Grossly symmetric    T(C): 37.1 (04-26-20 @ 21:23), Max: 37.2 (04-26-20 @ 07:50)  HR: 69 (04-26-20 @ 21:23) (69 - 82)  BP: 162/91 (04-26-20 @ 21:23) (134/74 - 171/96)  RR: 18 (04-26-20 @ 21:23) (18 - 20)  SpO2: 95% (04-26-20 @ 21:23) (95% - 100%)    04-26-20 @ 07:01  -  04-27-20 @ 01:09  --------------------------------------------------------  IN: 0 mL / OUT: 50 mL / NET: -50 mL        MEDICATIONS  enoxaparin Injectable 40 milliGRAM(s) SubCutaneous every 12 hours  lactated ringers. 1000 milliLiter(s) IV Continuous <Continuous>  levothyroxine Injectable 100 MICROGram(s) IV Push at bedtime  ondansetron Injectable 4 milliGRAM(s) IV Push once  pantoprazole  Injectable 40 milliGRAM(s) IV Push daily      LABS                        11.4   6.91  )-----------( 294      ( 26 Apr 2020 07:36 )             36.0     04-26    141  |  102  |  17  ----------------------------<  115<H>  3.8   |  25  |  1.08    Ca    9.6      26 Apr 2020 07:36  Phos  3.3     04-26  Mg     1.9     04-26    TPro  9.1<H>  /  Alb  3.9  /  TBili  0.2  /  DBili  x   /  AST  50<H>  /  ALT  21  /  AlkPhos  55  04-26    PT/INR - ( 26 Apr 2020 03:20 )   PT: 12.2 SEC;   INR: 1.07          PTT - ( 26 Apr 2020 03:20 )  PTT:31.9 SEC      RADIOLOGY & ADDITIONAL STUDIES GENERAL SURGERY PROGRESS NOTE    SUBJECTIVE  Patient seen and examined.     Failed gastrograffin challenge: 12h after 120cc of gastrograffin given via NGT and clamped, contrast still seen within stomach @12AM abdominal XR  @12:30am pt c/o nausea and ab pain x 2 hours (patient did not tell the RN until 12:30am). [facility] uncomfortable, and Abdomen was soft, nondistended, and TTP in epigastric region with no rebound or guarding. NGT placed back to [facility], 400cc of clear gray/light green fluid came out in 5 mins, and pain and nausea immediately improved. still c/o headache  zofran x 1 given  @ 1:30am, patient asleep and snoring, appeared comfortable, NGT in place    This AM, NPO/NGT without nausea, vomiting, -/-, voiding without issues, have been ambulating and out of bed. Denies fever, chills, SOB, chest pain.         OBJECTIVE    PHYSICAL EXAM  General: Appears well, NAD, NGT  CHEST: breathing comfortably  CV: appears well perfused  Abdomen: soft, nontender, nondistended, no rebound or guarding  Extremities: Grossly symmetric    T(C): 37.1 (04-26-20 @ 21:23), Max: 37.2 (04-26-20 @ 07:50)  HR: 69 (04-26-20 @ 21:23) (69 - 82)  BP: 162/91 (04-26-20 @ 21:23) (134/74 - 171/96)  RR: 18 (04-26-20 @ 21:23) (18 - 20)  SpO2: 95% (04-26-20 @ 21:23) (95% - 100%)    04-26-20 @ 07:01  -  04-27-20 @ 01:09  --------------------------------------------------------  IN: 0 mL / OUT: 50 mL / NET: -50 mL        MEDICATIONS  enoxaparin Injectable 40 milliGRAM(s) SubCutaneous every 12 hours  lactated ringers. 1000 milliLiter(s) IV Continuous <Continuous>  levothyroxine Injectable 100 MICROGram(s) IV Push at bedtime  ondansetron Injectable 4 milliGRAM(s) IV Push once  pantoprazole  Injectable 40 milliGRAM(s) IV Push daily      LABS                        11.4   6.91  )-----------( 294      ( 26 Apr 2020 07:36 )             36.0     04-26    141  |  102  |  17  ----------------------------<  115<H>  3.8   |  25  |  1.08    Ca    9.6      26 Apr 2020 07:36  Phos  3.3     04-26  Mg     1.9     04-26    TPro  9.1<H>  /  Alb  3.9  /  TBili  0.2  /  DBili  x   /  AST  50<H>  /  ALT  21  /  AlkPhos  55  04-26    PT/INR - ( 26 Apr 2020 03:20 )   PT: 12.2 SEC;   INR: 1.07          PTT - ( 26 Apr 2020 03:20 )  PTT:31.9 SEC      RADIOLOGY & ADDITIONAL STUDIES

## 2020-04-28 LAB
ANION GAP SERPL CALC-SCNC: 14 MMO/L — SIGNIFICANT CHANGE UP (ref 7–14)
BUN SERPL-MCNC: 9 MG/DL — SIGNIFICANT CHANGE UP (ref 7–23)
CALCIUM SERPL-MCNC: 8 MG/DL — LOW (ref 8.4–10.5)
CHLORIDE SERPL-SCNC: 103 MMOL/L — SIGNIFICANT CHANGE UP (ref 98–107)
CO2 SERPL-SCNC: 19 MMOL/L — LOW (ref 22–31)
CREAT SERPL-MCNC: 0.72 MG/DL — SIGNIFICANT CHANGE UP (ref 0.5–1.3)
GLUCOSE SERPL-MCNC: 61 MG/DL — LOW (ref 70–99)
HCT VFR BLD CALC: 28.6 % — LOW (ref 34.5–45)
HGB BLD-MCNC: 9 G/DL — LOW (ref 11.5–15.5)
MAGNESIUM SERPL-MCNC: 1.3 MG/DL — LOW (ref 1.6–2.6)
MCHC RBC-ENTMCNC: 29.8 PG — SIGNIFICANT CHANGE UP (ref 27–34)
MCHC RBC-ENTMCNC: 30.8 % — LOW (ref 32–36)
MCV RBC AUTO: 96.7 FL — SIGNIFICANT CHANGE UP (ref 80–100)
NRBC # FLD: 0 K/UL — SIGNIFICANT CHANGE UP (ref 0–0)
PHOSPHATE SERPL-MCNC: 1.8 MG/DL — LOW (ref 2.5–4.5)
PLATELET # BLD AUTO: 234 K/UL — SIGNIFICANT CHANGE UP (ref 150–400)
PMV BLD: 11.2 FL — SIGNIFICANT CHANGE UP (ref 7–13)
POTASSIUM SERPL-MCNC: 4 MMOL/L — SIGNIFICANT CHANGE UP (ref 3.5–5.3)
POTASSIUM SERPL-SCNC: 4 MMOL/L — SIGNIFICANT CHANGE UP (ref 3.5–5.3)
RBC # BLD: 2.99 M/UL — LOW (ref 3.8–5.2)
RBC # FLD: 14.3 % — SIGNIFICANT CHANGE UP (ref 10.3–14.5)
SODIUM SERPL-SCNC: 136 MMOL/L — SIGNIFICANT CHANGE UP (ref 135–145)
WBC # BLD: 6.22 K/UL — SIGNIFICANT CHANGE UP (ref 3.8–10.5)
WBC # FLD AUTO: 6.22 K/UL — SIGNIFICANT CHANGE UP (ref 3.8–10.5)

## 2020-04-28 RX ORDER — LOSARTAN POTASSIUM 100 MG/1
100 TABLET, FILM COATED ORAL DAILY
Refills: 0 | Status: DISCONTINUED | OUTPATIENT
Start: 2020-04-28 | End: 2020-04-30

## 2020-04-28 RX ORDER — HYDROCHLOROTHIAZIDE 25 MG
25 TABLET ORAL DAILY
Refills: 0 | Status: DISCONTINUED | OUTPATIENT
Start: 2020-04-28 | End: 2020-04-30

## 2020-04-28 RX ORDER — MAGNESIUM SULFATE 500 MG/ML
2 VIAL (ML) INJECTION ONCE
Refills: 0 | Status: COMPLETED | OUTPATIENT
Start: 2020-04-28 | End: 2020-04-28

## 2020-04-28 RX ORDER — SODIUM CHLORIDE 9 MG/ML
1000 INJECTION, SOLUTION INTRAVENOUS
Refills: 0 | Status: DISCONTINUED | OUTPATIENT
Start: 2020-04-28 | End: 2020-04-29

## 2020-04-28 RX ORDER — LEVOTHYROXINE SODIUM 125 MCG
200 TABLET ORAL DAILY
Refills: 0 | Status: DISCONTINUED | OUTPATIENT
Start: 2020-04-28 | End: 2020-04-30

## 2020-04-28 RX ADMIN — ENOXAPARIN SODIUM 40 MILLIGRAM(S): 100 INJECTION SUBCUTANEOUS at 18:46

## 2020-04-28 RX ADMIN — Medication 127.5 MILLIMOLE(S): at 12:03

## 2020-04-28 RX ADMIN — Medication 50 GRAM(S): at 10:27

## 2020-04-28 RX ADMIN — ENOXAPARIN SODIUM 40 MILLIGRAM(S): 100 INJECTION SUBCUTANEOUS at 06:05

## 2020-04-28 RX ADMIN — PANTOPRAZOLE SODIUM 40 MILLIGRAM(S): 20 TABLET, DELAYED RELEASE ORAL at 12:03

## 2020-04-28 RX ADMIN — SODIUM CHLORIDE 100 MILLILITER(S): 9 INJECTION, SOLUTION INTRAVENOUS at 10:27

## 2020-04-28 NOTE — PROGRESS NOTE ADULT - ASSESSMENT
Assessment	  63F h/o multiple prior adhesive SBOs all managed non-operatively p/w 24 hours of progressively worsening abdominal pain, nausea and CT imaging showing a small bowel obstruction. Failed gastrograffin challenge 4/26    - failed gastrograffin challenge 4/26  - NPO/NGT/IVF  - holding home BP meds  - c/w home levothyroxine  - replete electrolytes prn  - monitor vitals/i&o's  - DVT ppx w/Lovenox    General Surgery  [id number]

## 2020-04-28 NOTE — PROGRESS NOTE ADULT - SUBJECTIVE AND OBJECTIVE BOX
Surgery Progress Note  Patient is a 65y old  Female who presents with a chief complaint of small bowel obstruction (27 Apr 2020 01:09)      SUBJECTIVE: Patient seen and examined at bedside with surgical team, patient without complaints.   NGT fell out during the morning. Patient denied nausea, vomiting, and abdominal pain after.   Patient given trial without NGT however overtime she became increasing nauseous, began spitting up,   and developed abdominal distension. NGT placed with 600cc of immediate output.   Patient have intermittent but tolerable abdominal pain.     Vital Signs Last 24 Hrs  T(C): 37.4 (27 Apr 2020 23:07), Max: 37.4 (27 Apr 2020 23:07)  T(F): 99.4 (27 Apr 2020 23:07), Max: 99.4 (27 Apr 2020 23:07)  HR: 67 (27 Apr 2020 23:07) (66 - 75)  BP: 154/89 (27 Apr 2020 23:07) (149/90 - 164/99)  RR: 17 (27 Apr 2020 23:07) (17 - 18)  SpO2: 99% (27 Apr 2020 23:07) (98% - 100%)    Physical Exam  Constitutional: NAD  Neuro: awake, alert   Respiratory: breathing comfortably on RA  Abd: NGT in place, soft, non-tender, non-distended   Ext: moving all four ext spontaneously     I&O's Detail    26 Apr 2020 07:01  -  27 Apr 2020 07:00  --------------------------------------------------------  IN:  Total IN: 0 mL    OUT:    Nasoenteral Tube: 750 mL  Total OUT: 750 mL    Total NET: -750 mL      27 Apr 2020 07:01  -  28 Apr 2020 00:45  --------------------------------------------------------  IN:  Total IN: 0 mL    OUT:    Nasoenteral Tube: 1150 mL  Total OUT: 1150 mL    Total NET: -1150 mL      MEDICATIONS  (STANDING):  enoxaparin Injectable 40 milliGRAM(s) SubCutaneous every 12 hours  lactated ringers. 1000 milliLiter(s) (100 mL/Hr) IV Continuous <Continuous>  levothyroxine Injectable 100 MICROGram(s) IV Push at bedtime  pantoprazole  Injectable 40 milliGRAM(s) IV Push daily  tetracaine/benzocaine/butamben Spray 1 Spray(s) Topical daily    MEDICATIONS  (PRN):      LABS:                        12.5   6.34  )-----------( 331      ( 27 Apr 2020 06:00 )             37.2     04-27    142  |  103  |  14  ----------------------------<  116<H>  3.5   |  26  |  1.07    Ca    9.6      27 Apr 2020 06:00  Phos  3.6     04-27  Mg     1.8     04-27    TPro  9.1<H>  /  Alb  3.9  /  TBili  0.2  /  DBili  x   /  AST  50<H>  /  ALT  21  /  AlkPhos  55  04-26    PT/INR - ( 26 Apr 2020 03:20 )   PT: 12.2 SEC;   INR: 1.07          PTT - ( 26 Apr 2020 03:20 )  PTT:31.9 SEC  LIVER FUNCTIONS - ( 26 Apr 2020 03:20 )  Alb: 3.9 g/dL / Pro: 9.1 g/dL / ALK PHOS: 55 u/L / ALT: 21 u/L / AST: 50 u/L / GGT: x

## 2020-04-29 LAB
ANION GAP SERPL CALC-SCNC: 12 MMO/L — SIGNIFICANT CHANGE UP (ref 7–14)
ANION GAP SERPL CALC-SCNC: 13 MMO/L — SIGNIFICANT CHANGE UP (ref 7–14)
BUN SERPL-MCNC: 9 MG/DL — SIGNIFICANT CHANGE UP (ref 7–23)
BUN SERPL-MCNC: 9 MG/DL — SIGNIFICANT CHANGE UP (ref 7–23)
CALCIUM SERPL-MCNC: 9.5 MG/DL — SIGNIFICANT CHANGE UP (ref 8.4–10.5)
CALCIUM SERPL-MCNC: 9.6 MG/DL — SIGNIFICANT CHANGE UP (ref 8.4–10.5)
CHLORIDE SERPL-SCNC: 98 MMOL/L — SIGNIFICANT CHANGE UP (ref 98–107)
CHLORIDE SERPL-SCNC: 99 MMOL/L — SIGNIFICANT CHANGE UP (ref 98–107)
CO2 SERPL-SCNC: 24 MMOL/L — SIGNIFICANT CHANGE UP (ref 22–31)
CO2 SERPL-SCNC: 25 MMOL/L — SIGNIFICANT CHANGE UP (ref 22–31)
CREAT SERPL-MCNC: 1.03 MG/DL — SIGNIFICANT CHANGE UP (ref 0.5–1.3)
CREAT SERPL-MCNC: 1.07 MG/DL — SIGNIFICANT CHANGE UP (ref 0.5–1.3)
GLUCOSE BLDC GLUCOMTR-MCNC: 102 MG/DL — HIGH (ref 70–99)
GLUCOSE SERPL-MCNC: 104 MG/DL — HIGH (ref 70–99)
GLUCOSE SERPL-MCNC: 117 MG/DL — HIGH (ref 70–99)
HCT VFR BLD CALC: 38.1 % — SIGNIFICANT CHANGE UP (ref 34.5–45)
HCT VFR BLD CALC: 40.5 % — SIGNIFICANT CHANGE UP (ref 34.5–45)
HGB BLD-MCNC: 12.4 G/DL — SIGNIFICANT CHANGE UP (ref 11.5–15.5)
HGB BLD-MCNC: 13 G/DL — SIGNIFICANT CHANGE UP (ref 11.5–15.5)
MAGNESIUM SERPL-MCNC: 1.8 MG/DL — SIGNIFICANT CHANGE UP (ref 1.6–2.6)
MAGNESIUM SERPL-MCNC: 2.1 MG/DL — SIGNIFICANT CHANGE UP (ref 1.6–2.6)
MCHC RBC-ENTMCNC: 30.2 PG — SIGNIFICANT CHANGE UP (ref 27–34)
MCHC RBC-ENTMCNC: 30.4 PG — SIGNIFICANT CHANGE UP (ref 27–34)
MCHC RBC-ENTMCNC: 32.1 % — SIGNIFICANT CHANGE UP (ref 32–36)
MCHC RBC-ENTMCNC: 32.5 % — SIGNIFICANT CHANGE UP (ref 32–36)
MCV RBC AUTO: 93.4 FL — SIGNIFICANT CHANGE UP (ref 80–100)
MCV RBC AUTO: 94 FL — SIGNIFICANT CHANGE UP (ref 80–100)
NRBC # FLD: 0 K/UL — SIGNIFICANT CHANGE UP (ref 0–0)
NRBC # FLD: 0 K/UL — SIGNIFICANT CHANGE UP (ref 0–0)
PHOSPHATE SERPL-MCNC: 2.3 MG/DL — LOW (ref 2.5–4.5)
PHOSPHATE SERPL-MCNC: 2.5 MG/DL — SIGNIFICANT CHANGE UP (ref 2.5–4.5)
PLATELET # BLD AUTO: 277 K/UL — SIGNIFICANT CHANGE UP (ref 150–400)
PLATELET # BLD AUTO: 290 K/UL — SIGNIFICANT CHANGE UP (ref 150–400)
PMV BLD: 11.1 FL — SIGNIFICANT CHANGE UP (ref 7–13)
PMV BLD: 11.3 FL — SIGNIFICANT CHANGE UP (ref 7–13)
POTASSIUM SERPL-MCNC: 3.2 MMOL/L — LOW (ref 3.5–5.3)
POTASSIUM SERPL-MCNC: 4 MMOL/L — SIGNIFICANT CHANGE UP (ref 3.5–5.3)
POTASSIUM SERPL-SCNC: 3.2 MMOL/L — LOW (ref 3.5–5.3)
POTASSIUM SERPL-SCNC: 4 MMOL/L — SIGNIFICANT CHANGE UP (ref 3.5–5.3)
RBC # BLD: 4.08 M/UL — SIGNIFICANT CHANGE UP (ref 3.8–5.2)
RBC # BLD: 4.31 M/UL — SIGNIFICANT CHANGE UP (ref 3.8–5.2)
RBC # FLD: 13.7 % — SIGNIFICANT CHANGE UP (ref 10.3–14.5)
RBC # FLD: 13.9 % — SIGNIFICANT CHANGE UP (ref 10.3–14.5)
SODIUM SERPL-SCNC: 135 MMOL/L — SIGNIFICANT CHANGE UP (ref 135–145)
SODIUM SERPL-SCNC: 136 MMOL/L — SIGNIFICANT CHANGE UP (ref 135–145)
WBC # BLD: 8.15 K/UL — SIGNIFICANT CHANGE UP (ref 3.8–10.5)
WBC # BLD: 8.87 K/UL — SIGNIFICANT CHANGE UP (ref 3.8–10.5)
WBC # FLD AUTO: 8.15 K/UL — SIGNIFICANT CHANGE UP (ref 3.8–10.5)
WBC # FLD AUTO: 8.87 K/UL — SIGNIFICANT CHANGE UP (ref 3.8–10.5)

## 2020-04-29 RX ORDER — POTASSIUM CHLORIDE 20 MEQ
10 PACKET (EA) ORAL
Refills: 0 | Status: COMPLETED | OUTPATIENT
Start: 2020-04-29 | End: 2020-04-29

## 2020-04-29 RX ORDER — SODIUM,POTASSIUM PHOSPHATES 278-250MG
1 POWDER IN PACKET (EA) ORAL
Refills: 0 | Status: DISCONTINUED | OUTPATIENT
Start: 2020-04-29 | End: 2020-04-29

## 2020-04-29 RX ORDER — ACETAMINOPHEN 500 MG
1000 TABLET ORAL ONCE
Refills: 0 | Status: COMPLETED | OUTPATIENT
Start: 2020-04-29 | End: 2020-04-29

## 2020-04-29 RX ORDER — POTASSIUM PHOSPHATE, MONOBASIC POTASSIUM PHOSPHATE, DIBASIC 236; 224 MG/ML; MG/ML
15 INJECTION, SOLUTION INTRAVENOUS ONCE
Refills: 0 | Status: COMPLETED | OUTPATIENT
Start: 2020-04-29 | End: 2020-04-29

## 2020-04-29 RX ORDER — MAGNESIUM SULFATE 500 MG/ML
2 VIAL (ML) INJECTION ONCE
Refills: 0 | Status: COMPLETED | OUTPATIENT
Start: 2020-04-29 | End: 2020-04-29

## 2020-04-29 RX ADMIN — Medication 200 MICROGRAM(S): at 06:43

## 2020-04-29 RX ADMIN — POTASSIUM PHOSPHATE, MONOBASIC POTASSIUM PHOSPHATE, DIBASIC 62.5 MILLIMOLE(S): 236; 224 INJECTION, SOLUTION INTRAVENOUS at 18:32

## 2020-04-29 RX ADMIN — Medication 100 MILLIEQUIVALENT(S): at 12:53

## 2020-04-29 RX ADMIN — Medication 50 GRAM(S): at 23:13

## 2020-04-29 RX ADMIN — LOSARTAN POTASSIUM 100 MILLIGRAM(S): 100 TABLET, FILM COATED ORAL at 06:43

## 2020-04-29 RX ADMIN — Medication 400 MILLIGRAM(S): at 01:30

## 2020-04-29 RX ADMIN — SODIUM CHLORIDE 50 MILLILITER(S): 9 INJECTION, SOLUTION INTRAVENOUS at 10:28

## 2020-04-29 RX ADMIN — Medication 100 MILLIEQUIVALENT(S): at 14:25

## 2020-04-29 RX ADMIN — Medication 25 MILLIGRAM(S): at 06:43

## 2020-04-29 RX ADMIN — ENOXAPARIN SODIUM 40 MILLIGRAM(S): 100 INJECTION SUBCUTANEOUS at 06:43

## 2020-04-29 RX ADMIN — ENOXAPARIN SODIUM 40 MILLIGRAM(S): 100 INJECTION SUBCUTANEOUS at 18:32

## 2020-04-29 RX ADMIN — Medication 100 MILLIEQUIVALENT(S): at 16:37

## 2020-04-29 NOTE — PROGRESS NOTE ADULT - ASSESSMENT
63F h/o multiple prior adhesive SBOs all managed non-operatively p/w 24 hours of progressively worsening abdominal pain, nausea and CT imaging showing a small bowel obstruction. Failed gastrograffin challenge 4/26, now with bowel function.     - CLD, ADAT   - resumed home meds, except valsartan   - c/w home levothyroxine  - replete electrolytes prn  - monitor vitals/i&o's  - DVT ppx w/Lovenox    General Surgery  [id number]

## 2020-04-29 NOTE — PROGRESS NOTE ADULT - SUBJECTIVE AND OBJECTIVE BOX
SURGERY DAILY PROGRESS NOTE:     SUBJECTIVE/24hr Events:     Patient seen and examined on am rounds. Yesterday pt w/ flatus and large BM. NG tube self discontinued and pt started on CLD, which was well tolerated. This am pt complains of occasional mid abdomen pain lasting a few seconds. No acute events overnight. This am pt feels well. Pain well controlled.  Denies chest pain, shortness of breath, nausea, vomiting, fever chills.     OBJECTIVE:    MEDICATIONS  (STANDING):  dextrose 5% + sodium chloride 0.45% 1000 milliLiter(s) (50 mL/Hr) IV Continuous <Continuous>  enoxaparin Injectable 40 milliGRAM(s) SubCutaneous every 12 hours  hydrochlorothiazide 25 milliGRAM(s) Oral daily  levothyroxine 200 MICROGram(s) Oral daily  losartan 100 milliGRAM(s) Oral daily    MEDICATIONS  (PRN):      Vital Signs Last 24 Hrs  T(C): 37.1 (29 Apr 2020 06:41), Max: 37.2 (28 Apr 2020 18:22)  T(F): 98.7 (29 Apr 2020 06:41), Max: 99 (28 Apr 2020 18:22)  HR: 62 (29 Apr 2020 06:41) (62 - 68)  BP: 156/97 (29 Apr 2020 06:41) (138/84 - 157/82)  BP(mean): --  RR: 18 (29 Apr 2020 06:41) (17 - 19)  SpO2: 100% (29 Apr 2020 06:41) (96% - 100%)      I&O's Detail    28 Apr 2020 07:01  -  29 Apr 2020 07:00  --------------------------------------------------------  IN:  Total IN: 0 mL    OUT:    Voided: 400 mL  Total OUT: 400 mL    Total NET: -400 mL            Daily     Daily           LABS:                        9.0    6.22  )-----------( 234      ( 28 Apr 2020 08:15 )             28.6     04-28    136  |  103  |  9   ----------------------------<  61<L>  4.0   |  19<L>  |  0.72    Ca    8.0<L>      28 Apr 2020 08:15  Phos  1.8     04-28  Mg     1.3     04-28                    PHYSICAL EXAM:  Constitutional: well developed, well nourished, NAD  ENMT: normal facies, symmetric  Respiratory: Normal respiratory effort   Abdomen: Soft, minimally distended. Nontender. No rebound or guarding.

## 2020-04-29 NOTE — PROGRESS NOTE ADULT - ATTENDING COMMENTS
Above noted. Had one bowel movement yesterday and three episodes of diarrhea today. Denies abdominal pain.  Physical Exam:  Afebrile.  Abdomen: Soft, non-tender.  Plan: Advance to a regular diet for dinner today.
Above noted. Had pain, nausea after the NG tube came out yesterday- replaced. No bowel function.  Physical Exam: Afebrile.  Abdomen: Soft, tender over the right para-umbilical area.  Plan: Continue present management.
Above noted. Has less pain, denies nausea, no bowel function. NG tube fell out earlier today.  Physical Exam: Afebrile.  Abdomen: Soft, minimal james-umbilical tenderness.  Labs: WBC: WNL.  Small bowel follow-thru: No definite contrast in the colon.  Plan: Continue NPO. Hold off replacing the NG tube.

## 2020-04-30 ENCOUNTER — TRANSCRIPTION ENCOUNTER (OUTPATIENT)
Age: 65
End: 2020-04-30

## 2020-04-30 VITALS — WEIGHT: 134.92 LBS

## 2020-04-30 RX ORDER — SODIUM,POTASSIUM PHOSPHATES 278-250MG
2 POWDER IN PACKET (EA) ORAL ONCE
Refills: 0 | Status: COMPLETED | OUTPATIENT
Start: 2020-04-30 | End: 2020-04-30

## 2020-04-30 RX ADMIN — Medication 2 PACKET(S): at 09:23

## 2020-04-30 RX ADMIN — Medication 25 MILLIGRAM(S): at 06:47

## 2020-04-30 RX ADMIN — ENOXAPARIN SODIUM 40 MILLIGRAM(S): 100 INJECTION SUBCUTANEOUS at 06:47

## 2020-04-30 RX ADMIN — Medication 200 MICROGRAM(S): at 06:47

## 2020-04-30 NOTE — DIETITIAN INITIAL EVALUATION ADULT. - REASON INDICATOR FOR ASSESSMENT
Pt seen for Length Of Stay on [address]  Information obtained from: Electronic medical record  Multiple attempts made to contact pt and family, unable to reach at this time.

## 2020-04-30 NOTE — DIETITIAN INITIAL EVALUATION ADULT. - ADD RECOMMEND
1) Recommend change diet order to low sodium in setting of hx of HTN 2) Review/provide nutrition related education as needed 3) WIll continue to monitor weight, PO intake, and diet tolerance

## 2020-04-30 NOTE — DIETITIAN INITIAL EVALUATION ADULT. - ENERGY NEEDS
Height (cm): 170.2 (04-26) Weight (pounds): 254.3 (04-26) BMI (kg/m2): 39.6 (04-26)  IBW: 135 pounds +/- 10%          % IBW:  188%

## 2020-04-30 NOTE — PROGRESS NOTE ADULT - SUBJECTIVE AND OBJECTIVE BOX
GENERAL SURGERY PROGRESS NOTE    SUBJECTIVE  Patient seen and examined. No acute events overnight.   had episodes of diarrhea 4/29, felt much better overall after  Reports tolerating reg diet without nausea, vomiting, passing flatus, having bowel movements, voiding without issues, have been ambulating and out of bed. Denies fever, chills, SOB, chest pain.         OBJECTIVE    PHYSICAL EXAM  General: Appears well, NAD  CHEST: breathing comfortably  CV: appears well perfused  Abdomen: soft, nontender, nondistended, no rebound or guarding  Extremities: Grossly symmetric    T(C): 36.8 (04-30-20 @ 06:45), Max: 37.3 (04-29-20 @ 21:58)  HR: 62 (04-30-20 @ 06:45) (53 - 67)  BP: 142/79 (04-30-20 @ 06:45) (142/79 - 169/97)  RR: 16 (04-30-20 @ 06:45) (16 - 18)  SpO2: 100% (04-30-20 @ 06:45) (97% - 100%)      MEDICATIONS  enoxaparin Injectable 40 milliGRAM(s) SubCutaneous every 12 hours  hydrochlorothiazide 25 milliGRAM(s) Oral daily  levothyroxine 200 MICROGram(s) Oral daily  losartan 100 milliGRAM(s) Oral daily      LABS                        13.0   8.87  )-----------( 277      ( 29 Apr 2020 18:10 )             40.5     04-29    136  |  99  |  9   ----------------------------<  104<H>  4.0   |  24  |  1.07    Ca    9.6      29 Apr 2020 18:10  Phos  2.3     04-29  Mg     1.8     04-29            RADIOLOGY & ADDITIONAL STUDIES

## 2020-04-30 NOTE — DISCHARGE NOTE PROVIDER - HOSPITAL COURSE
HPI: 63F h/o laparoscopic partial nephrectomy / KANDY c/b multiple prior adhesive SBOs all managed non-operatively p/w 24 hours of progressively worsening abdominal pain, nausea and vomiting; CT imaging showing a small bowel obstruction. Last flatus and BM were 24 hours prior when the pain start. NGT placed in the ED without ~300cc of gastric contents. Patient is afebrile, non-leukocytotic, hemodynamically stable and no signs of peritonitis. No anion gap, bicarb within normal limits. Remains making urine.         Patient denies fevers / chills, shortness of breath / cough. HPI: 63F h/o laparoscopic partial nephrectomy / KANDY c/b multiple prior adhesive SBOs all managed non-operatively p/w 24 hours of progressively worsening abdominal pain, nausea and vomiting; CT imaging showing a small bowel obstruction. Last flatus and BM were 24 hours prior when the pain start. NGT placed in the ED without ~300cc of gastric contents. Patient is afebrile, non-leukocytotic, hemodynamically stable and no signs of peritonitis. No anion gap, bicarb within normal limits. Remains making urine.         Patient was admitted with the diagnosis of sbo and placed on bowel rest. NGT was placed and serial abdominal exams were done.  Once the patient's abdominal pain began to improve, her diet was then restarted and slowly advanced as tolerated.  As of HD 4 , the patient's pain had resolved and she was tolerating a regular diet without nausea, vomiting, or abdominal pain.  He was voiding well and ambulating without difficulty at the time of discharge.

## 2020-04-30 NOTE — DIETITIAN INITIAL EVALUATION ADULT. - PHYSICAL APPEARANCE
Nutrition focused physical exam deferred at this time due to limited contact precautions/other (specify) No edema or pressure injuries noted in nursing flowsheet

## 2020-04-30 NOTE — DISCHARGE NOTE NURSING/CASE MANAGEMENT/SOCIAL WORK - PATIENT PORTAL LINK FT
You can access the FollowMyHealth Patient Portal offered by [facility] by registering at the following website: [email or web address]. By joining [facility]’s FollowMyHealth portal, you will also be able to view your health information using other applications (apps) compatible with our system.

## 2020-04-30 NOTE — DIETITIAN INITIAL EVALUATION ADULT. - OTHER INFO
65yoF with PMH of partial nephrectomy/KANDY, multiple prior adhesive SBOs (treated non-operatively), hypothyroidism, osteoarthritis, and HTN admitted for worsening abdominal pain, nausea, and vomiting, found to have SBO. Multiple attempts made to contact pt and pt's family, unable to reach at this time. Limited information obtained.     Per chart, pt was advanced from clear liquid diet to regular diet (04-29), currently tolerating regular diet. No noted nausea, vomiting, diarrhea, or constipation at this time, last BM x 1 (04-29). No chewing/swallowing difficulty noted. NKFA.    Weight hx per nursing flowsheet (pounds): 254.3 (04-26-20), 238 (07-23-19)  Pt with 7.4 pound wt gain x 9 months.

## 2020-04-30 NOTE — DISCHARGE NOTE NURSING/CASE MANAGEMENT/SOCIAL WORK - NSDCPNINST_GEN_ALL_CORE
Notify provider for any nausea/vomiting, abdominal pain, inability to tolerate diet or if your abdomen becomes distended and you are not able to pass gas/stool. Contact provider for any fevers greater than 100.4F.

## 2020-04-30 NOTE — DISCHARGE NOTE PROVIDER - NSDCMRMEDTOKEN_GEN_ALL_CORE_FT
levothyroxine 200 mcg (0.2 mg) oral tablet: 1 tab(s) orally once a day  olmesartan-hydrochlorothiazide 40 mg-25 mg oral tablet: 1 tab(s) orally once a day  verapamil 360 mg/24 hours oral capsule, extended release: 1 cap(s) orally every other day

## 2020-04-30 NOTE — PROGRESS NOTE ADULT - ASSESSMENT
63F h/o multiple prior adhesive SBOs all managed non-operatively p/w 24 hours of progressively worsening abdominal pain, nausea and CT imaging showing a small bowel obstruction. Failed gastrograffin challenge 4/26, now with bowel function.     - reg diet  - can resume home meds  - c/w home levothyroxine  - replete electrolytes prn  - monitor vitals/i&o's  - DVT ppx w/Lovenox    General Surgery  [id number]

## 2020-04-30 NOTE — DISCHARGE NOTE PROVIDER - CARE PROVIDER_API CALL
Abel Mcdonough)  Surgery  [address], [address]  [place] [address]  Phone: [phone]  Fax: [phone]  Established Patient  Follow Up Time: 2 weeks

## 2020-05-01 ENCOUNTER — APPOINTMENT (OUTPATIENT)
Dept: INTERNAL MEDICINE | Facility: CLINIC | Age: 65
End: 2020-05-01

## 2020-05-01 ENCOUNTER — APPOINTMENT (OUTPATIENT)
Dept: INTERNAL MEDICINE | Facility: CLINIC | Age: 65
End: 2020-05-01
Payer: COMMERCIAL

## 2020-05-01 DIAGNOSIS — R77.8 OTHER SPECIFIED ABNORMALITIES OF PLASMA PROTEINS: ICD-10-CM

## 2020-05-01 PROCEDURE — 99442: CPT

## 2020-05-01 NOTE — ED PROVIDER NOTE - CCCP TRG CHIEF CMPLNT
If you are a smoker, it is important for your health to stop smoking. Please be aware that second hand smoke is also harmful. abd pain

## 2020-05-15 LAB
ALBUMIN MFR SERPL ELPH: 48 %
ALBUMIN SERPL ELPH-MCNC: 3.7 G/DL
ALBUMIN SERPL-MCNC: 3.5 G/DL
ALBUMIN/GLOB SERPL: 0.9 RATIO
ALP BLD-CCNC: 100 U/L
ALPHA1 GLOB MFR SERPL ELPH: 4.8 %
ALPHA1 GLOB SERPL ELPH-MCNC: 0.4 G/DL
ALPHA2 GLOB MFR SERPL ELPH: 11.3 %
ALPHA2 GLOB SERPL ELPH-MCNC: 0.8 G/DL
ALT SERPL-CCNC: 42 U/L
ANION GAP SERPL CALC-SCNC: 12 MMOL/L
AST SERPL-CCNC: 18 U/L
B-GLOBULIN MFR SERPL ELPH: 14.3 %
B-GLOBULIN SERPL ELPH-MCNC: 1 G/DL
BASOPHILS # BLD AUTO: 0.03 K/UL
BASOPHILS NFR BLD AUTO: 0.3 %
BILIRUB SERPL-MCNC: <0.2 MG/DL
BUN SERPL-MCNC: 20 MG/DL
CALCIUM SERPL-MCNC: 9.4 MG/DL
CHLORIDE SERPL-SCNC: 105 MMOL/L
CO2 SERPL-SCNC: 26 MMOL/L
CREAT SERPL-MCNC: 1.15 MG/DL
DEPRECATED KAPPA LC FREE/LAMBDA SER: 2.42 RATIO
EOSINOPHIL # BLD AUTO: 0.3 K/UL
EOSINOPHIL NFR BLD AUTO: 3 %
GAMMA GLOB FLD ELPH-MCNC: 1.6 G/DL
GAMMA GLOB MFR SERPL ELPH: 21.6 %
GLUCOSE SERPL-MCNC: 75 MG/DL
HCT VFR BLD CALC: 37.8 %
HGB BLD-MCNC: 11.4 G/DL
IGA SER QL IEP: 417 MG/DL
IGG SER QL IEP: 1582 MG/DL
IGM SER QL IEP: 113 MG/DL
IMM GRANULOCYTES NFR BLD AUTO: 0.8 %
INTERPRETATION SERPL IEP-IMP: NORMAL
KAPPA LC CSF-MCNC: 2.48 MG/DL
KAPPA LC SERPL-MCNC: 6.01 MG/DL
LYMPHOCYTES # BLD AUTO: 2.48 K/UL
LYMPHOCYTES NFR BLD AUTO: 24.4 %
M PROTEIN SPEC IFE-MCNC: NORMAL
MAGNESIUM SERPL-MCNC: 1.7 MG/DL
MAN DIFF?: NORMAL
MCHC RBC-ENTMCNC: 30.2 GM/DL
MCHC RBC-ENTMCNC: 30.2 PG
MCV RBC AUTO: 100.3 FL
MONOCYTES # BLD AUTO: 0.71 K/UL
MONOCYTES NFR BLD AUTO: 7 %
NEUTROPHILS # BLD AUTO: 6.56 K/UL
NEUTROPHILS NFR BLD AUTO: 64.5 %
PLATELET # BLD AUTO: 326 K/UL
POTASSIUM SERPL-SCNC: 4.1 MMOL/L
PROT SERPL-MCNC: 7.3 G/DL
RBC # BLD: 3.77 M/UL
RBC # FLD: 14.7 %
SODIUM SERPL-SCNC: 143 MMOL/L
WBC # FLD AUTO: 10.16 K/UL

## 2020-12-10 ENCOUNTER — LABORATORY RESULT (OUTPATIENT)
Age: 65
End: 2020-12-10

## 2020-12-14 LAB
ALBUMIN SERPL ELPH-MCNC: 3.9 G/DL
ALP BLD-CCNC: 64 U/L
ALT SERPL-CCNC: 28 U/L
ANION GAP SERPL CALC-SCNC: 14 MMOL/L
APPEARANCE: CLEAR
AST SERPL-CCNC: 23 U/L
BACTERIA: ABNORMAL
BASOPHILS # BLD AUTO: 0.02 K/UL
BASOPHILS NFR BLD AUTO: 0.3 %
BILIRUB SERPL-MCNC: 0.2 MG/DL
BILIRUBIN URINE: NEGATIVE
BLOOD URINE: NEGATIVE
BUN SERPL-MCNC: 18 MG/DL
CALCIUM SERPL-MCNC: 9.4 MG/DL
CHLORIDE SERPL-SCNC: 97 MMOL/L
CO2 SERPL-SCNC: 28 MMOL/L
COLOR: YELLOW
CREAT SERPL-MCNC: 1.45 MG/DL
DEPRECATED D DIMER PPP IA-ACNC: 364 NG/ML DDU
EOSINOPHIL # BLD AUTO: 0.04 K/UL
EOSINOPHIL NFR BLD AUTO: 0.6 %
ESTIMATED AVERAGE GLUCOSE: 114 MG/DL
GLUCOSE QUALITATIVE U: NEGATIVE
GLUCOSE SERPL-MCNC: 114 MG/DL
HBA1C MFR BLD HPLC: 5.6 %
HCT VFR BLD CALC: 42.3 %
HGB BLD-MCNC: 13 G/DL
HYALINE CASTS: 0 /LPF
IMM GRANULOCYTES NFR BLD AUTO: 0.5 %
KETONES URINE: NEGATIVE
LEUKOCYTE ESTERASE URINE: NEGATIVE
LYMPHOCYTES # BLD AUTO: 2.16 K/UL
LYMPHOCYTES NFR BLD AUTO: 35.1 %
MAN DIFF?: NORMAL
MCHC RBC-ENTMCNC: 28.9 PG
MCHC RBC-ENTMCNC: 30.7 GM/DL
MCV RBC AUTO: 94 FL
MICROSCOPIC-UA: NORMAL
MONOCYTES # BLD AUTO: 0.55 K/UL
MONOCYTES NFR BLD AUTO: 8.9 %
NEUTROPHILS # BLD AUTO: 3.36 K/UL
NEUTROPHILS NFR BLD AUTO: 54.6 %
NITRITE URINE: NEGATIVE
PH URINE: 6
PLATELET # BLD AUTO: 328 K/UL
POTASSIUM SERPL-SCNC: 3.5 MMOL/L
PROT SERPL-MCNC: 7.8 G/DL
PROTEIN URINE: ABNORMAL
RBC # BLD: 4.5 M/UL
RBC # FLD: 13.9 %
RED BLOOD CELLS URINE: 3 /HPF
SODIUM SERPL-SCNC: 139 MMOL/L
SPECIFIC GRAVITY URINE: 1.02
SQUAMOUS EPITHELIAL CELLS: 11 /HPF
T4 FREE SERPL-MCNC: 1.6 NG/DL
TSH SERPL-ACNC: 2.71 UIU/ML
UROBILINOGEN URINE: NORMAL
WBC # FLD AUTO: 6.16 K/UL
WHITE BLOOD CELLS URINE: 7 /HPF

## 2021-01-26 ENCOUNTER — APPOINTMENT (OUTPATIENT)
Dept: INTERNAL MEDICINE | Facility: CLINIC | Age: 66
End: 2021-01-26
Payer: COMMERCIAL

## 2021-01-26 PROCEDURE — 99442: CPT

## 2021-01-26 RX ORDER — LEVOFLOXACIN 500 MG/1
500 TABLET, FILM COATED ORAL DAILY
Qty: 10 | Refills: 0 | Status: COMPLETED | COMMUNITY
Start: 2021-01-26 | End: 2021-02-05

## 2021-01-27 NOTE — HISTORY OF PRESENT ILLNESS
[Home] : at home, [unfilled] , at the time of the visit. [Other Location: e.g. Home (Enter Location, City,State)___] : at [unfilled] [Verbal consent obtained from patient] : the patient, [unfilled] [FreeTextEntry1] : Pam continues with incredible fatigue related to her recent Covid infection.  Works nights with she is finding difficult because then she has trouble sleeping or can sleep too much.  She is exhausted.  Complaining of cough which she feels is being driven by a severe postnasal drip and her sinuses as she feels they are very tender and thick with mucus.  Sometimes when she blows that is yellow.  No fever at this time.  She does not think the cough is from deep in her chest well.\par \par Based on our conversation it sounds like she may have an underlying sinusitis.  Recommend Flonase for the postnasal drip and will treat with Levaquin 500 mg daily for 10 days that she has a penicillin allergy.  If no improvement in the cough would then consider pursuing imaging with a CT of her chest to look for any Covid changes. [Time Spent: ___ minutes] : I have spent [unfilled] minutes with the patient on the telephone

## 2021-03-09 ENCOUNTER — APPOINTMENT (OUTPATIENT)
Dept: ORTHOPEDIC SURGERY | Facility: CLINIC | Age: 66
End: 2021-03-09

## 2021-03-16 ENCOUNTER — APPOINTMENT (OUTPATIENT)
Dept: INTERNAL MEDICINE | Facility: CLINIC | Age: 66
End: 2021-03-16
Payer: COMMERCIAL

## 2021-03-16 DIAGNOSIS — K59.00 CONSTIPATION, UNSPECIFIED: ICD-10-CM

## 2021-03-16 PROCEDURE — 99442: CPT

## 2021-04-13 ENCOUNTER — NON-APPOINTMENT (OUTPATIENT)
Age: 66
End: 2021-04-13

## 2021-04-13 ENCOUNTER — APPOINTMENT (OUTPATIENT)
Dept: NEUROLOGY | Facility: CLINIC | Age: 66
End: 2021-04-13
Payer: COMMERCIAL

## 2021-04-13 VITALS
BODY MASS INDEX: 36.73 KG/M2 | RESPIRATION RATE: 16 BRPM | WEIGHT: 234 LBS | SYSTOLIC BLOOD PRESSURE: 135 MMHG | DIASTOLIC BLOOD PRESSURE: 87 MMHG | OXYGEN SATURATION: 98 % | HEIGHT: 67 IN | TEMPERATURE: 98.1 F | HEART RATE: 69 BPM

## 2021-04-13 DIAGNOSIS — I67.1 CEREBRAL ANEURYSM, NONRUPTURED: ICD-10-CM

## 2021-04-13 DIAGNOSIS — Z82.3 FAMILY HISTORY OF STROKE: ICD-10-CM

## 2021-04-13 DIAGNOSIS — H93.19 TINNITUS, UNSPECIFIED EAR: ICD-10-CM

## 2021-04-13 PROCEDURE — 99204 OFFICE O/P NEW MOD 45 MIN: CPT

## 2021-04-13 PROCEDURE — 99072 ADDL SUPL MATRL&STAF TM PHE: CPT

## 2021-04-13 NOTE — DISCUSSION/SUMMARY
[FreeTextEntry1] : Ms. Mena is a 66 year-old woman that presented to the office today for evaluation of a 2 mm left supraclinoid ICA aneurysm. Due to the size and location of the aneurysm, treatment is not indicated at this time, as risk of treatment outweighs the benefits. We will obtain an MRA head for aneurysm surveillance in 1 year.  The aneurysm is not the cause of her tinnitus. All of her questions and concerns were addressed.

## 2021-04-13 NOTE — PHYSICAL EXAM
[General Appearance - Alert] : alert [General Appearance - In No Acute Distress] : in no acute distress [General Appearance - Well Nourished] : well nourished [General Appearance - Well Developed] : well developed [Oriented To Time, Place, And Person] : oriented to person, place, and time [Impaired Insight] : insight and judgment were intact [Affect] : the affect was normal [Mood] : the mood was normal [Person] : oriented to person [Place] : oriented to place [Time] : oriented to time [Short Term Intact] : short term memory intact [Span Intact] : the attention span was normal [Concentration Intact] : normal concentrating ability [Visual Intact] : visual attention was ~T not ~L decreased [Naming Objects] : no difficulty naming common objects [Fluency] : fluency intact [Comprehension] : comprehension intact [Cranial Nerves Optic (II)] : visual acuity intact bilaterally,  visual fields full to confrontation, pupils equal round and reactive to light [Cranial Nerves Oculomotor (III)] : extraocular motion intact [Cranial Nerves Trigeminal (V)] : facial sensation intact symmetrically [Cranial Nerves Facial (VII)] : face symmetrical [Cranial Nerves Vestibulocochlear (VIII)] : hearing was intact bilaterally [Cranial Nerves Glossopharyngeal (IX)] : tongue and palate midline [Cranial Nerves Accessory (XI - Cranial And Spinal)] : head turning and shoulder shrug symmetric [Cranial Nerves Hypoglossal (XII)] : there was no tongue deviation with protrusion [Motor Strength] : muscle strength was normal in all four extremities [Sensation Tactile Decrease] : light touch was intact [Sclera] : the sclera and conjunctiva were normal [PERRL With Normal Accommodation] : pupils were equal in size, round, reactive to light, with normal accommodation [Extraocular Movements] : extraocular movements were intact [Full Visual Field] : full visual field [Outer Ear] : the ears and nose were normal in appearance [Hearing Threshold Finger Rub Not [place]] : hearing was normal [Neck Appearance] : the appearance of the neck was normal [] : no respiratory distress [Respiration, Rhythm And Depth] : normal respiratory rhythm and effort [Heart Rate And Rhythm] : heart rate was normal and rhythm regular [Edema] : there was no peripheral edema [Abnormal Walk] : normal gait [Motor Tone] : muscle strength and tone were normal [Skin Color & Pigmentation] : normal skin color and pigmentation [Paresis Pronator Drift Right-Sided] : no pronator drift on the right [Paresis Pronator Drift Left-Sided] : no pronator drift on the left [Motor Strength Upper Extremities Bilaterally] : strength was normal in both upper extremities [Motor Strength Lower Extremities Bilaterally] : strength was normal in both lower extremities [Limited Balance] : balance was intact [Past-pointing] : there was no past-pointing [Tremor] : no tremor present [Dysdiadochokinesia Bilaterally] : not present [Coordination - Dysmetria Impaired Finger-to-Nose Bilateral] : not present

## 2021-04-13 NOTE — HISTORY OF PRESENT ILLNESS
[FreeTextEntry1] : Ms. Mena is a 66 year-old woman with PMH that presents to the office today for evaluation of cerebral aneurysm. She was send for an MRA head by her PCP for evaluation of right ear tinnitus, which showed a 2mm left supraclinoid ICA aneurysm. I personally reviewed the MRA. She reports pulsatile tinnitus for several years, difficulty concentrating, memory loss and occasional word-finding difficulty. She denies any other new or concerning neurological symptoms.

## 2021-04-13 NOTE — REVIEW OF SYSTEMS
[Memory Lapses or Loss] : memory loss [Decr. Concentrating Ability] : decreased concentrating ability [Difficulty Writing] : difficulty writing [Lightheadedness] : lightheadedness [Constipation] : constipation [Confused or Disoriented] : no confusion [Difficulty with Language] : no ~M difficulty with language [Facial Weakness] : no facial weakness [Arm Weakness] : no arm weakness [Hand Weakness] : no hand weakness [Leg Weakness] : no leg weakness [Poor Coordination] : good coordination [Numbness] : no numbness [Tingling] : no tingling [Seizures] : no convulsions [Dizziness] : no dizziness [Fainting] : no fainting [Cluster Headache] : no cluster headache [Migraine Headache] : no migraine headache [Difficulty Walking] : no difficulty walking [Inability to Walk] : able to walk [Ataxia] : no ataxia [Frequent Falls] : not falling [Anxiety] : no anxiety [Depression] : no depression [Eye Pain] : no eye pain [Eyesight Problems] : no eyesight problems [Discharge From Eyes] : no purulent discharge from the eyes [Earache] : no earache [Loss Of Hearing] : no hearing loss [Chest Pain] : no chest pain [Palpitations] : no palpitations [Cough] : no cough [SOB on Exertion] : no shortness of breath during exertion [Diarrhea] : no diarrhea [Joint Pain] : no joint pain [Joint Swelling] : no joint swelling

## 2021-05-24 ENCOUNTER — NON-APPOINTMENT (OUTPATIENT)
Age: 66
End: 2021-05-24

## 2021-05-25 ENCOUNTER — APPOINTMENT (OUTPATIENT)
Dept: INTERNAL MEDICINE | Facility: CLINIC | Age: 66
End: 2021-05-25
Payer: COMMERCIAL

## 2021-05-25 VITALS
WEIGHT: 258 LBS | OXYGEN SATURATION: 97 % | BODY MASS INDEX: 40.41 KG/M2 | DIASTOLIC BLOOD PRESSURE: 80 MMHG | SYSTOLIC BLOOD PRESSURE: 126 MMHG | HEART RATE: 81 BPM

## 2021-05-25 PROCEDURE — 99072 ADDL SUPL MATRL&STAF TM PHE: CPT

## 2021-05-25 PROCEDURE — 36415 COLL VENOUS BLD VENIPUNCTURE: CPT

## 2021-05-25 PROCEDURE — 99214 OFFICE O/P EST MOD 30 MIN: CPT | Mod: 25

## 2021-05-25 RX ORDER — LOSARTAN POTASSIUM AND HYDROCHLOROTHIAZIDE 100; 12.5 MG/1; MG/1
TABLET, FILM COATED ORAL
Refills: 0 | Status: DISCONTINUED | COMMUNITY
End: 2021-05-25

## 2021-05-25 NOTE — HISTORY OF PRESENT ILLNESS
[de-identified] : 66-year-old female with a history of Graves' status post ablation and now on thyroid replacement here because of severe fatigue and she feels that her thyroid is not properly treated.  Finds that she is sleeping all day.  Has been worked up for sleep apnea in the past and she notes this is not sleep apnea.  She was trying to get a refill of all losartan from me but she was not on it.  I had her on a combination of losartan and hydrochlorothiazide.  When she showed me the bottle it was prescribed by another doctor.  She is currently taking just her verapamil for her blood pressure and takes her levothyroxine every day as directed.\par \par She recently saw neuro whose note was reviewed, her supraclinoid ICA aneurysm.  They felt because of the location and the size that treatment risks outweighed any benefits.  She will repeat the MRA/MRI in 1 year for stability.

## 2021-05-25 NOTE — ASSESSMENT
[FreeTextEntry1] : 1.  Fatigue -unlikely to be secondary to her thyroid but will check full TFTs as she has been euthyroid and TFTs normal for quite some time.  She did have a Covid infection and she may be a long-haul her who is suffering with severe fatigue.  She has yet to get her Covid vaccination.  I encouraged her to do so as there is some evidence coming to light that long-haul virus who are immunized actually start to feel better.  Although we do not understand the mechanism it may be jump starting the immune system to override what the immune system is doing.  We will check a CBC to rule out anemia\par 2.  Hypertension -has not been on an ARB/thiazide combination now for a month and her blood pressure is well controlled.  Will not restarted at this time.  Continue diltiazem.\par 3.  Small ICA aneurysm for follow-up in 1 year.  To keep blood pressure well controlled.\par 4.  History of renal cell carcinoma with CKD.  Will check a creatinine and CMP.\par 5.  He has stable marked follow-up pending above

## 2021-05-26 LAB
ALBUMIN SERPL ELPH-MCNC: 3.9 G/DL
ALP BLD-CCNC: 72 U/L
ALT SERPL-CCNC: 12 U/L
ANION GAP SERPL CALC-SCNC: 17 MMOL/L
AST SERPL-CCNC: 17 U/L
BASOPHILS # BLD AUTO: 0.03 K/UL
BASOPHILS NFR BLD AUTO: 0.4 %
BILIRUB SERPL-MCNC: 0.3 MG/DL
BUN SERPL-MCNC: 17 MG/DL
CALCIUM SERPL-MCNC: 9.8 MG/DL
CHLORIDE SERPL-SCNC: 105 MMOL/L
CO2 SERPL-SCNC: 21 MMOL/L
CREAT SERPL-MCNC: 1.13 MG/DL
EOSINOPHIL # BLD AUTO: 0.18 K/UL
EOSINOPHIL NFR BLD AUTO: 2.1 %
GLUCOSE SERPL-MCNC: 79 MG/DL
HCT VFR BLD CALC: 37.7 %
HGB BLD-MCNC: 11.6 G/DL
IMM GRANULOCYTES NFR BLD AUTO: 0.2 %
LYMPHOCYTES # BLD AUTO: 1.71 K/UL
LYMPHOCYTES NFR BLD AUTO: 20.1 %
MAN DIFF?: NORMAL
MCHC RBC-ENTMCNC: 29.9 PG
MCHC RBC-ENTMCNC: 30.8 GM/DL
MCV RBC AUTO: 97.2 FL
MONOCYTES # BLD AUTO: 0.59 K/UL
MONOCYTES NFR BLD AUTO: 6.9 %
NEUTROPHILS # BLD AUTO: 5.96 K/UL
NEUTROPHILS NFR BLD AUTO: 70.3 %
PLATELET # BLD AUTO: 355 K/UL
POTASSIUM SERPL-SCNC: 3.9 MMOL/L
PROT SERPL-MCNC: 7.7 G/DL
RBC # BLD: 3.88 M/UL
RBC # FLD: 14 %
SODIUM SERPL-SCNC: 143 MMOL/L
T3FREE SERPL-MCNC: 2.45 PG/ML
T4 FREE SERPL-MCNC: 1.7 NG/DL
TSH SERPL-ACNC: 0.52 UIU/ML
WBC # FLD AUTO: 8.49 K/UL

## 2021-12-27 ENCOUNTER — APPOINTMENT (OUTPATIENT)
Dept: INTERNAL MEDICINE | Facility: CLINIC | Age: 66
End: 2021-12-27

## 2021-12-28 ENCOUNTER — APPOINTMENT (OUTPATIENT)
Dept: INTERNAL MEDICINE | Facility: CLINIC | Age: 66
End: 2021-12-28
Payer: COMMERCIAL

## 2021-12-28 PROCEDURE — 99213 OFFICE O/P EST LOW 20 MIN: CPT | Mod: 95

## 2021-12-28 NOTE — PHYSICAL EXAM
[No Acute Distress] : no acute distress [Well Nourished] : well nourished [Well Developed] : well developed [Well-Appearing] : well-appearing [No Respiratory Distress] : no respiratory distress  [No Focal Deficits] : no focal deficits

## 2021-12-28 NOTE — HISTORY OF PRESENT ILLNESS
[Home] : at home, [unfilled] , at the time of the visit. [Medical Office: ([facility])___] : at the medical office located in  [Verbal consent obtained from patient] : the patient, [unfilled] [de-identified] : 66-year-old female with a history of hypertension and hypothyroidism who scheduled this appointment because her blood pressure has been running high.  Has also been noting increased swelling in her hands and feet.  Was on diuretics in the past which were stopped because of better blood pressure control.  Has had SBP's into the 150s and today it was 140/90..  About 3 months ago, she saw an endocrinologist who found her thyroid function test to be markedly elevated and her levothyroxine was decreased to 125 MCG daily.  She had blood tests 1 month ago and tells me that her TFTs were normal at that time.  Admits to being under a lot of stress.  Continues to work.

## 2021-12-28 NOTE — ASSESSMENT
[FreeTextEntry1] : 1.  Hypertension -reports elevated blood pressure with increasing edema.  Will start Dyazide once daily as calcium channel blockers can be associated with edema.  Unsure if the recent change of dose in levothyroxine is contributing to this.  We will check a BMP.\par 2.  Hypothyroidism with recent adjustment in levothyroxine abnormal TFTs subsequent to that 1 month ago.  Will send for repeat TFTs given the elevated blood pressure and edema.\par 3.  Follow-up in the office in approximately 1 month.

## 2022-01-20 ENCOUNTER — TRANSCRIPTION ENCOUNTER (OUTPATIENT)
Age: 67
End: 2022-01-20

## 2022-07-26 ENCOUNTER — APPOINTMENT (OUTPATIENT)
Dept: NEPHROLOGY | Facility: CLINIC | Age: 67
End: 2022-07-26

## 2022-07-26 VITALS
BODY MASS INDEX: 40.14 KG/M2 | TEMPERATURE: 97.2 F | DIASTOLIC BLOOD PRESSURE: 101 MMHG | HEART RATE: 59 BPM | HEIGHT: 67 IN | SYSTOLIC BLOOD PRESSURE: 185 MMHG | WEIGHT: 255.73 LBS | OXYGEN SATURATION: 98 %

## 2022-07-26 VITALS
RESPIRATION RATE: 15 BRPM | DIASTOLIC BLOOD PRESSURE: 88 MMHG | SYSTOLIC BLOOD PRESSURE: 136 MMHG | OXYGEN SATURATION: 99 % | HEART RATE: 60 BPM

## 2022-07-26 PROCEDURE — 99203 OFFICE O/P NEW LOW 30 MIN: CPT

## 2022-07-26 NOTE — PHYSICAL EXAM
[General Appearance - Alert] : alert [General Appearance - In No Acute Distress] : in no acute distress [Sclera] : the sclera and conjunctiva were normal [Jugular Venous Distention Increased] : there was no jugular-venous distention [Auscultation Breath Sounds / Voice Sounds] : lungs were clear to auscultation bilaterally [Heart Sounds] : normal S1 and S2 [Edema] : there was no peripheral edema [Abdomen Tenderness] : non-tender [] : no hepato-splenomegaly [Urinary Bladder Findings] : the bladder was normal on palpation [Abnormal Walk] : normal gait [Skin Color & Pigmentation] : normal skin color and pigmentation [No Focal Deficits] : no focal deficits [Oriented To Time, Place, And Person] : oriented to person, place, and time

## 2022-07-26 NOTE — HISTORY OF PRESENT ILLNESS
[FreeTextEntry1] : Chart reviewed. The patient serum creatinine has been intermittently elevated.  [facility] and [facility] was 1.71.  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 Follow up scans have been negative.  Her previous history is significant for Grave's disease, now hypothyroidism, Knee surgery, Hysterectomy and several admissions to the hospital for small bowel obstructions from adhesions.  She has hypertension and checks her BP at home regularly: usually in the 130/80 range.  She is not taking over the counter NSAIDs. She is taking multiple vitamins. No family history of kidney disease. Father w/ hypertension.  \par The purpose of this visit is to asses if there is any form of underlying parenchymal kidney disease.

## 2022-07-26 NOTE — CONSULT LETTER
[Dear  ___] : Dear  [unfilled], [Consult Letter:] : I had the pleasure of evaluating your patient, [unfilled]. [Please see my note below.] : Please see my note below. [Consult Closing:] : Thank you very much for allowing me to participate in the care of this patient.  If you have any questions, please do not hesitate to contact me. [Sincerely,] : Sincerely, [FreeTextEntry2] : Dr. Silvana Smith [FreeTextEntry3] : Harshil Burns MD

## 2022-07-26 NOTE — ASSESSMENT
[FreeTextEntry1] : 1. Intermittently elevated serum creatinine and urine protein of 100.  No family history.  Hypertensive nephropathy is a possibility (maladaptive focal sclerosis).  The patient blood pressure is borderline elevated and she has proteinuria.  Will check repeat chemistries, cystatin C eGFR and urine protein/creatinine ratio. If evidence of parenchymal renal disease will discuss a more aggressive BP regimen and antiproteinuric agents.  \par 2. Hypertension: borderline elevated. See above.\par PLAN: will discuss the results of the blood and urine test tomorrow. Will arrange for proper follow up in accord w/ the results of the work up.

## 2022-07-26 NOTE — REVIEW OF SYSTEMS
[As Noted in HPI] : as noted in HPI [Negative] : Psychiatric [de-identified] : History of tinnitus and a small cerebral aneurysm.

## 2022-07-27 LAB
ALBUMIN SERPL ELPH-MCNC: 4.5 G/DL
ALP BLD-CCNC: 63 U/L
ALT SERPL-CCNC: 11 U/L
ANION GAP SERPL CALC-SCNC: 16 MMOL/L
APPEARANCE: CLEAR
AST SERPL-CCNC: 20 U/L
BACTERIA: NEGATIVE
BILIRUB SERPL-MCNC: 0.3 MG/DL
BILIRUBIN URINE: NEGATIVE
BLOOD URINE: NEGATIVE
BUN SERPL-MCNC: 16 MG/DL
CALCIUM SERPL-MCNC: 10.1 MG/DL
CHLORIDE SERPL-SCNC: 101 MMOL/L
CO2 SERPL-SCNC: 24 MMOL/L
COLOR: NORMAL
CREAT SERPL-MCNC: 1.29 MG/DL
CREAT SPEC-SCNC: 97 MG/DL
CREAT/PROT UR: 0.8 RATIO
CYSTATIN C SERPL-MCNC: 1.25 MG/L
EGFR: 45 ML/MIN/1.73M2
GFR/BSA.PRED SERPLBLD CYS-BASED-ARV: 52 ML/MIN/1.73M2
GLUCOSE QUALITATIVE U: NEGATIVE
GLUCOSE SERPL-MCNC: 73 MG/DL
HYALINE CASTS: 1 /LPF
KETONES URINE: NEGATIVE
LEUKOCYTE ESTERASE URINE: ABNORMAL
MICROSCOPIC-UA: NORMAL
NITRITE URINE: NEGATIVE
PH URINE: 6.5
POTASSIUM SERPL-SCNC: 4 MMOL/L
PROT SERPL-MCNC: 8.1 G/DL
PROT UR-MCNC: 78 MG/DL
PROTEIN URINE: ABNORMAL
RED BLOOD CELLS URINE: 3 /HPF
SODIUM SERPL-SCNC: 140 MMOL/L
SPECIFIC GRAVITY URINE: 1.01
SQUAMOUS EPITHELIAL CELLS: 2 /HPF
UROBILINOGEN URINE: NORMAL
WHITE BLOOD CELLS URINE: 16 /HPF

## 2022-08-17 ENCOUNTER — APPOINTMENT (OUTPATIENT)
Dept: INTERNAL MEDICINE | Facility: CLINIC | Age: 67
End: 2022-08-17

## 2022-08-17 VITALS
HEART RATE: 78 BPM | DIASTOLIC BLOOD PRESSURE: 90 MMHG | OXYGEN SATURATION: 98 % | SYSTOLIC BLOOD PRESSURE: 172 MMHG | HEIGHT: 67 IN | BODY MASS INDEX: 39.87 KG/M2 | WEIGHT: 254 LBS

## 2022-08-17 DIAGNOSIS — F32.A DEPRESSION, UNSPECIFIED: ICD-10-CM

## 2022-08-17 DIAGNOSIS — Z13.39 ENCOUNTER FOR SCREENING EXAM FOR OTHER MENTAL HEALTH AND BEHAVIORAL DISORDERS: ICD-10-CM

## 2022-08-17 DIAGNOSIS — Z00.00 ENCOUNTER FOR GENERAL ADULT MEDICAL EXAMINATION W/OUT ABNORMAL FINDINGS: ICD-10-CM

## 2022-08-17 DIAGNOSIS — Z86.79 PERSONAL HISTORY OF OTHER DISEASES OF THE CIRCULATORY SYSTEM: ICD-10-CM

## 2022-08-17 DIAGNOSIS — Z13.31 ENCOUNTER FOR SCREENING FOR DEPRESSION: ICD-10-CM

## 2022-08-17 PROCEDURE — G0404: CPT

## 2022-08-17 PROCEDURE — G0402 INITIAL PREVENTIVE EXAM: CPT

## 2022-08-17 PROCEDURE — G0442 ANNUAL ALCOHOL SCREEN 15 MIN: CPT

## 2022-08-17 RX ORDER — CEFUROXIME AXETIL 500 MG/1
500 TABLET ORAL
Qty: 14 | Refills: 0 | Status: DISCONTINUED | COMMUNITY
Start: 2022-03-27

## 2022-08-17 RX ORDER — PROGESTERONE 200 MG/1
200 CAPSULE ORAL
Qty: 90 | Refills: 0 | Status: DISCONTINUED | COMMUNITY
Start: 2022-06-19

## 2022-09-18 ENCOUNTER — INPATIENT (INPATIENT)
Facility: HOSPITAL | Age: 67
LOS: 1 days | Discharge: HOME | End: 2022-09-20
Attending: SPECIALIST | Admitting: SPECIALIST

## 2022-09-18 VITALS
OXYGEN SATURATION: 98 % | HEART RATE: 68 BPM | WEIGHT: 253.97 LBS | RESPIRATION RATE: 17 BRPM | TEMPERATURE: 98 F | DIASTOLIC BLOOD PRESSURE: 82 MMHG | SYSTOLIC BLOOD PRESSURE: 162 MMHG | HEIGHT: 67 IN

## 2022-09-18 DIAGNOSIS — R10.9 UNSPECIFIED ABDOMINAL PAIN: ICD-10-CM

## 2022-09-18 LAB
ALBUMIN SERPL ELPH-MCNC: 4.6 G/DL — SIGNIFICANT CHANGE UP (ref 3.3–5)
ALP SERPL-CCNC: 61 U/L — SIGNIFICANT CHANGE UP (ref 40–120)
ALT FLD-CCNC: 11 U/L — SIGNIFICANT CHANGE UP (ref 4–33)
ANION GAP SERPL CALC-SCNC: 16 MMOL/L — HIGH (ref 7–14)
APTT BLD: 32.3 SEC — SIGNIFICANT CHANGE UP (ref 27–36.3)
AST SERPL-CCNC: 32 U/L — SIGNIFICANT CHANGE UP (ref 4–32)
BASE EXCESS BLDV CALC-SCNC: 5 MMOL/L — HIGH (ref -2–3)
BASOPHILS # BLD AUTO: 0.01 K/UL — SIGNIFICANT CHANGE UP (ref 0–0.2)
BASOPHILS NFR BLD AUTO: 0.1 % — SIGNIFICANT CHANGE UP (ref 0–2)
BILIRUB SERPL-MCNC: 0.5 MG/DL — SIGNIFICANT CHANGE UP (ref 0.2–1.2)
BLD GP AB SCN SERPL QL: NEGATIVE — SIGNIFICANT CHANGE UP
BLOOD GAS VENOUS COMPREHENSIVE RESULT: SIGNIFICANT CHANGE UP
BUN SERPL-MCNC: 14 MG/DL — SIGNIFICANT CHANGE UP (ref 7–23)
CALCIUM SERPL-MCNC: 10.3 MG/DL — SIGNIFICANT CHANGE UP (ref 8.4–10.5)
CHLORIDE BLDV-SCNC: 101 MMOL/L — SIGNIFICANT CHANGE UP (ref 96–108)
CHLORIDE SERPL-SCNC: 98 MMOL/L — SIGNIFICANT CHANGE UP (ref 98–107)
CO2 BLDV-SCNC: 32.5 MMOL/L — HIGH (ref 22–26)
CO2 SERPL-SCNC: 23 MMOL/L — SIGNIFICANT CHANGE UP (ref 22–31)
CREAT SERPL-MCNC: 1.2 MG/DL — SIGNIFICANT CHANGE UP (ref 0.5–1.3)
EGFR: 49 ML/MIN/1.73M2 — LOW
EOSINOPHIL # BLD AUTO: 0.02 K/UL — SIGNIFICANT CHANGE UP (ref 0–0.5)
EOSINOPHIL NFR BLD AUTO: 0.3 % — SIGNIFICANT CHANGE UP (ref 0–6)
GAS PNL BLDV: 133 MMOL/L — LOW (ref 136–145)
GLUCOSE BLDV-MCNC: 93 MG/DL — SIGNIFICANT CHANGE UP (ref 70–99)
GLUCOSE SERPL-MCNC: 93 MG/DL — SIGNIFICANT CHANGE UP (ref 70–99)
HCO3 BLDV-SCNC: 31 MMOL/L — HIGH (ref 22–29)
HCT VFR BLD CALC: 41.8 % — SIGNIFICANT CHANGE UP (ref 34.5–45)
HCT VFR BLDA CALC: 41 % — SIGNIFICANT CHANGE UP (ref 34.5–46.5)
HGB BLD CALC-MCNC: 13.6 G/DL — SIGNIFICANT CHANGE UP (ref 11.5–15.5)
HGB BLD-MCNC: 13.1 G/DL — SIGNIFICANT CHANGE UP (ref 11.5–15.5)
IANC: 5.59 K/UL — SIGNIFICANT CHANGE UP (ref 1.8–7.4)
IMM GRANULOCYTES NFR BLD AUTO: 0.4 % — SIGNIFICANT CHANGE UP (ref 0–0.9)
INR BLD: 1.05 RATIO — SIGNIFICANT CHANGE UP (ref 0.88–1.16)
LACTATE BLDV-MCNC: 1.8 MMOL/L — SIGNIFICANT CHANGE UP (ref 0.5–2)
LACTATE SERPL-SCNC: 0.6 MMOL/L — SIGNIFICANT CHANGE UP (ref 0.5–2)
LIDOCAIN IGE QN: 15 U/L — SIGNIFICANT CHANGE UP (ref 7–60)
LYMPHOCYTES # BLD AUTO: 1.73 K/UL — SIGNIFICANT CHANGE UP (ref 1–3.3)
LYMPHOCYTES # BLD AUTO: 22.4 % — SIGNIFICANT CHANGE UP (ref 13–44)
MCHC RBC-ENTMCNC: 29.2 PG — SIGNIFICANT CHANGE UP (ref 27–34)
MCHC RBC-ENTMCNC: 31.3 GM/DL — LOW (ref 32–36)
MCV RBC AUTO: 93.1 FL — SIGNIFICANT CHANGE UP (ref 80–100)
MONOCYTES # BLD AUTO: 0.36 K/UL — SIGNIFICANT CHANGE UP (ref 0–0.9)
MONOCYTES NFR BLD AUTO: 4.7 % — SIGNIFICANT CHANGE UP (ref 2–14)
NEUTROPHILS # BLD AUTO: 5.59 K/UL — SIGNIFICANT CHANGE UP (ref 1.8–7.4)
NEUTROPHILS NFR BLD AUTO: 72.1 % — SIGNIFICANT CHANGE UP (ref 43–77)
NRBC # BLD: 0 /100 WBCS — SIGNIFICANT CHANGE UP (ref 0–0)
NRBC # FLD: 0 K/UL — SIGNIFICANT CHANGE UP (ref 0–0)
NT-PROBNP SERPL-SCNC: 44 PG/ML — SIGNIFICANT CHANGE UP
PCO2 BLDV: 50 MMHG — HIGH (ref 39–42)
PH BLDV: 7.4 — SIGNIFICANT CHANGE UP (ref 7.32–7.43)
PLATELET # BLD AUTO: 302 K/UL — SIGNIFICANT CHANGE UP (ref 150–400)
PO2 BLDV: 35 MMHG — SIGNIFICANT CHANGE UP
POTASSIUM BLDV-SCNC: 5 MMOL/L — SIGNIFICANT CHANGE UP (ref 3.5–5.1)
POTASSIUM SERPL-MCNC: 4.5 MMOL/L — SIGNIFICANT CHANGE UP (ref 3.5–5.3)
POTASSIUM SERPL-SCNC: 4.5 MMOL/L — SIGNIFICANT CHANGE UP (ref 3.5–5.3)
PROT SERPL-MCNC: 9.6 G/DL — HIGH (ref 6–8.3)
PROTHROM AB SERPL-ACNC: 12.2 SEC — SIGNIFICANT CHANGE UP (ref 10.5–13.4)
RBC # BLD: 4.49 M/UL — SIGNIFICANT CHANGE UP (ref 3.8–5.2)
RBC # FLD: 16.3 % — HIGH (ref 10.3–14.5)
RH IG SCN BLD-IMP: POSITIVE — SIGNIFICANT CHANGE UP
SAO2 % BLDV: 59 % — SIGNIFICANT CHANGE UP
SARS-COV-2 RNA SPEC QL NAA+PROBE: SIGNIFICANT CHANGE UP
SODIUM SERPL-SCNC: 137 MMOL/L — SIGNIFICANT CHANGE UP (ref 135–145)
TROPONIN T, HIGH SENSITIVITY RESULT: 11 NG/L — SIGNIFICANT CHANGE UP
WBC # BLD: 7.74 K/UL — SIGNIFICANT CHANGE UP (ref 3.8–10.5)
WBC # FLD AUTO: 7.74 K/UL — SIGNIFICANT CHANGE UP (ref 3.8–10.5)

## 2022-09-18 PROCEDURE — G1004: CPT

## 2022-09-18 PROCEDURE — 99221 1ST HOSP IP/OBS SF/LOW 40: CPT | Mod: GC

## 2022-09-18 PROCEDURE — 99285 EMERGENCY DEPT VISIT HI MDM: CPT

## 2022-09-18 PROCEDURE — 71045 X-RAY EXAM CHEST 1 VIEW: CPT | Mod: 26,76

## 2022-09-18 PROCEDURE — 74177 CT ABD & PELVIS W/CONTRAST: CPT | Mod: 26,MG

## 2022-09-18 RX ORDER — ONDANSETRON 8 MG/1
4 TABLET, FILM COATED ORAL ONCE
Refills: 0 | Status: COMPLETED | OUTPATIENT
Start: 2022-09-18 | End: 2022-09-18

## 2022-09-18 RX ORDER — SODIUM CHLORIDE 9 MG/ML
1000 INJECTION, SOLUTION INTRAVENOUS
Refills: 0 | Status: DISCONTINUED | OUTPATIENT
Start: 2022-09-18 | End: 2022-09-19

## 2022-09-18 RX ORDER — LEVOTHYROXINE SODIUM 125 MCG
75 TABLET ORAL AT BEDTIME
Refills: 0 | Status: DISCONTINUED | OUTPATIENT
Start: 2022-09-18 | End: 2022-09-19

## 2022-09-18 RX ORDER — METOPROLOL TARTRATE 50 MG
5 TABLET ORAL EVERY 6 HOURS
Refills: 0 | Status: DISCONTINUED | OUTPATIENT
Start: 2022-09-18 | End: 2022-09-20

## 2022-09-18 RX ORDER — KETOROLAC TROMETHAMINE 30 MG/ML
15 SYRINGE (ML) INJECTION ONCE
Refills: 0 | Status: DISCONTINUED | OUTPATIENT
Start: 2022-09-18 | End: 2022-09-18

## 2022-09-18 RX ORDER — HEPARIN SODIUM 5000 [USP'U]/ML
5000 INJECTION INTRAVENOUS; SUBCUTANEOUS EVERY 8 HOURS
Refills: 0 | Status: DISCONTINUED | OUTPATIENT
Start: 2022-09-18 | End: 2022-09-20

## 2022-09-18 RX ORDER — PANTOPRAZOLE SODIUM 20 MG/1
40 TABLET, DELAYED RELEASE ORAL DAILY
Refills: 0 | Status: DISCONTINUED | OUTPATIENT
Start: 2022-09-18 | End: 2022-09-19

## 2022-09-18 RX ORDER — SODIUM CHLORIDE 9 MG/ML
500 INJECTION INTRAMUSCULAR; INTRAVENOUS; SUBCUTANEOUS ONCE
Refills: 0 | Status: COMPLETED | OUTPATIENT
Start: 2022-09-18 | End: 2022-09-18

## 2022-09-18 RX ORDER — ACETAMINOPHEN 500 MG
1000 TABLET ORAL ONCE
Refills: 0 | Status: COMPLETED | OUTPATIENT
Start: 2022-09-18 | End: 2022-09-19

## 2022-09-18 RX ORDER — ACETAMINOPHEN 500 MG
1000 TABLET ORAL ONCE
Refills: 0 | Status: COMPLETED | OUTPATIENT
Start: 2022-09-18 | End: 2022-09-18

## 2022-09-18 RX ADMIN — ONDANSETRON 4 MILLIGRAM(S): 8 TABLET, FILM COATED ORAL at 03:44

## 2022-09-18 RX ADMIN — SODIUM CHLORIDE 500 MILLILITER(S): 9 INJECTION INTRAMUSCULAR; INTRAVENOUS; SUBCUTANEOUS at 03:55

## 2022-09-18 RX ADMIN — PANTOPRAZOLE SODIUM 40 MILLIGRAM(S): 20 TABLET, DELAYED RELEASE ORAL at 21:09

## 2022-09-18 RX ADMIN — SODIUM CHLORIDE 125 MILLILITER(S): 9 INJECTION, SOLUTION INTRAVENOUS at 11:24

## 2022-09-18 RX ADMIN — SODIUM CHLORIDE 125 MILLILITER(S): 9 INJECTION, SOLUTION INTRAVENOUS at 21:09

## 2022-09-18 RX ADMIN — Medication 400 MILLIGRAM(S): at 03:43

## 2022-09-18 RX ADMIN — HEPARIN SODIUM 5000 UNIT(S): 5000 INJECTION INTRAVENOUS; SUBCUTANEOUS at 18:04

## 2022-09-18 RX ADMIN — Medication 5 MILLIGRAM(S): at 18:04

## 2022-09-18 RX ADMIN — Medication 75 MICROGRAM(S): at 21:09

## 2022-09-18 RX ADMIN — Medication 15 MILLIGRAM(S): at 03:44

## 2022-09-18 NOTE — ED PROVIDER NOTE - PHYSICAL EXAMINATION
PHYSICAL EXAM:    GENERAL: NAD  HEENT:  Atraumatic  CHEST/LUNG: Chest rise equal bilaterally  HEART: Regular rate and rhythm  ABDOMEN: Soft, RLQ abd tenderness, Nondistended  EXTREMITIES:  Extremities warm  PSYCH: A&Ox3  SKIN: No obvious rashes or lesions  MSK: +1 Bilateral pitting edema  NEUROLOGY: strength and sensation intact in all extremities.

## 2022-09-18 NOTE — ED PROVIDER NOTE - CLINICAL SUMMARY MEDICAL DECISION MAKING FREE TEXT BOX
66 y/o female w/ PMH HTN w/ PSH multiple SBOs requiring diagnostic lap, c-sections, left partial nephrectomy c/o 1 day history of increasing diffuse abdominal pain that feels similar to prior SBOs requiring surgery. Concerning for SBO vs appendicitis. Will treat pain and CTAP. Draw trop and BNP 2/2 bilateral lower extremity pitting edema concerning for new-onset CHF. Reassess s/p scans and meds.

## 2022-09-18 NOTE — ED ADULT NURSE NOTE - OBJECTIVE STATEMENT
Pt A&Ox4, ambulatory. PT in NAD, resp equal and unlabored. pmhx: SBOx14. Pt states as os yesterday afternoon abd distention and umbilical region abd pain. Pt states she had several normal sized bowel movements. PT denies; n/v, fever/chills, abd rigidity, SOB, CP, diarrhea. 22g to L hand. US IV to be placed for IV contrast.

## 2022-09-18 NOTE — H&P ADULT - NSHPLABSRESULTS_GEN_ALL_CORE
13.1   7.74  )-----------( 302      ( 18 Sep 2022 03:35 )             41.8   09-18    137  |  98  |  14  ----------------------------<  93  4.5   |  23  |  1.20    Ca    10.3      18 Sep 2022 03:35    TPro  9.6<H>  /  Alb  4.6  /  TBili  0.5  /  DBili  x   /  AST  32  /  ALT  11  /  AlkPhos  61  09-18  < from: CT Abdomen and Pelvis w/ IV Cont (09.18.22 @ 05:19) >    FINDINGS:  Full examination of the intra-abdominal viscera and vasculature is   limited without the addition of IV contrast.    LOWER CHEST: Bibasilar atelectasis. Partially visualized enteric tube   with tip terminating in the stomach. Trace bilateral pleural effusions.   Small pericardial effusion.    LIVER: Hepatic cyst and 1.5 cm liver hypodensity unchanged from prior   exams.  BILE DUCTS: Normal caliber.  GALLBLADDER: Within normal limits.  SPLEEN: Within normal limits.  PANCREAS: Within normal limits.  ADRENALS: Grossly stable 1.6 cm nodularity along the inferior aspect of   the right adrenal gland. Left adrenal gland is within normal limits.  KIDNEYS/URETERS: Status post partial nephrectomy. Right-sided kidney is   within normal limits.    BLADDER: Within normal limits.  REPRODUCTIVE ORGANS: Hysterectomy.    BOWEL: Small bowel obstruction with transition in the lower anterior   abdomen (601:32). Small bowel feces sign just proximal to the transition   point. Distal small bowel loops are decompressed. Appendix is normal.  PERITONEUM: No ascites.  VESSELS: Within normal limits.  RETROPERITONEUM/LYMPH NODES: No lymphadenopathy.  ABDOMINAL WALL: Small fat-containing umbilical hernia.  BONES: Within normal limits.    IMPRESSION:  Small bowel obstruction with transition in the lower anterior abdomen,   similar in location to prior examination.    --- End of Report ---          MIREILLE LEAHY MD; Resident Radiologist  This document has been electronically signed.  ESHA GOMES MD; Attending Radiologist  This document has been electronically signed. Sep 18 2022  6:04AM    < end of copied text >

## 2022-09-18 NOTE — PATIENT PROFILE ADULT - FALL HARM RISK - HARM RISK INTERVENTIONS
Patient's TCU nurse called regarding CPM orders.  Per nurse, patient is at 90 degrees and the TCU would like to discontinue the CPM.  Per the resident working with Dr. Hill in clinic today, the CPM can be discontinued.  The TCU disconnected the call before the verbal order could be given.    Bethany Zuluaga LPN     Assistance with ambulation/Communicate Risk of Fall with Harm to all staff/Reinforce activity limits and safety measures with patient and family/Tailored Fall Risk Interventions/Visual Cue: Yellow wristband and red socks/Bed in lowest position, wheels locked, appropriate side rails in place/Call bell, personal items and telephone in reach/Instruct patient to call for assistance before getting out of bed or chair/Non-slip footwear when patient is out of bed/Pfeifer to call system/Physically safe environment - no spills, clutter or unnecessary equipment/Purposeful Proactive Rounding/Room/bathroom lighting operational, light cord in reach

## 2022-09-18 NOTE — ED PROCEDURE NOTE - ATTENDING CONTRIBUTION TO CARE
DR. DANIELS, ATTENDING MD-  I was in the exam room and observed and supervised the resident when they were completing the key portions of this procedure.

## 2022-09-18 NOTE — H&P ADULT - ASSESSMENT
66 y/o female w/ PMH of HTN and Hypothyroidism and w/ PSH of c-sections, left partial nephrectomy and SBOs managed nonoperatively in the past presents with 1 day history of increasing diffuse abdominal pain that feels similar to prior SBOs, and CT findings of dilated loops of small bowel and transition point in the LLQ similar to the previous one 2 years ago.    Plan:  - Admit to B team Surgery under Dr. Mcdonough, floor bed  - NPO/NGT/IVF   - serial abdominal exams  - repeat blood lactate  - no narcotic pain medications   - hold all po meds  - DVT ppx     d/w Dr. Mcdonough    B Team Surgery  [id number]

## 2022-09-18 NOTE — ED ADULT TRIAGE NOTE - CHIEF COMPLAINT QUOTE
Pt arrives to ED c/o abd pain and believe she may have an SBO.   Pt has hx of 15 SBOs with multiple surgeries in the past.   LBM yesterday, c/o nausea but no vomiting.  Hx of renal cancer more than 10 years ago

## 2022-09-18 NOTE — ED PROVIDER NOTE - OBJECTIVE STATEMENT
68 y/o female w/ PMH HTN w/ PSH multiple SBOs requiring diagnostic lap, c-sections, left partial nephrectomy c/o 1 day history of increasing diffuse abdominal pain that feels similar to prior SBOs requiring surgery. Denies fevers, chills, nausea, vomiting, dizziness, chest pain, SOB, dysuria, hematuria.

## 2022-09-18 NOTE — H&P ADULT - NSHPPHYSICALEXAM_GEN_ALL_CORE
General: NAD, appears comfortable  Resp: nonlabored, airway patent   Abd: soft, mildly distended, nontender. no rebound or guarding tenderness. NGT intact w/ gastric contents   Vasc: WWP  Neuro: A&Ox3

## 2022-09-18 NOTE — ED PROVIDER NOTE - IV ALTEPLASE ADMIN OUTSIDE HIDDEN
Problem: Patient Care Overview (Adult)  Goal: Plan of Care Review  Outcome: Ongoing (interventions implemented as appropriate)    03/02/17 0708   Coping/Psychosocial Response Interventions   Plan Of Care Reviewed With patient   Patient Care Overview   Progress improving            show

## 2022-09-18 NOTE — ED PROVIDER NOTE - NS ED ROS FT
GENERAL: No fever or chills  EYES: no change in vision   HEENT: no trouble swallowing or speaking   CARDIAC: no chest pain   PULMONARY: no cough or SOB  GI:  + abdominal pain  : No changes in urination   SKIN: no rashes   NEURO: no headache   MSK: No joint pain     All other ROS negative unless otherwise specified in HPI.

## 2022-09-18 NOTE — PATIENT PROFILE ADULT - HISTORY OF COVID-19 VACCINATION
After Visit Summary   12/3/2018    Noe Florence    MRN: [id number]           Patient Information     Date Of Birth          1936        Visit Information        Provider Department      12/3/2018 10:30 AM UC CV DEVICE 1 [facility]        Today's Diagnoses     NSVT (nonsustained ventricular tachycardia) (H)    -  1      Care Instructions    It was a pleasure to see you in clinic today.  Please do not hesitate to call with any questions or concerns.  You are scheduled for a remote transmission on 3/5/19.  We look forward to seeing you in clinic at your next device check in 6 months.    Kathy Bright, RN, MS, CCRN  Electrophysiology Nurse Clinician  [facility] [place]    During Business Hours Please Call:  [phone]  After Hours Please Call:  [phone] - select option #4 and ask for job code [phone]                        Follow-ups after your visit        Your next 10 appointments already scheduled     Jan 07, 2019 10:00 AM CST   (Arrive by 9:45 AM)   Return Visit with Roberto Sarmiento MD   [facility] ([facility])    [address] [place] [address]  4th Floor  [facility] [address] [phone]            Jan 08, 2019 11:00 AM CST   [facility] Draw with  [facility] DRAW   [facility] Draw ([facility])    [address] [place] [address]  [address]  [facility] [id number] [phone]            Jan 08, 2019 12:30 PM CST   Return Neuropathy Visit with Jase Duarte MD   [facility]UROSPECIAL[facility] ([facility])    [address]  [address]  [facility] [address] [phone]            Mar 05, 2019 12:00 AM CST   CARDIAC DEVICE CHECK - REMOTE with  ICD REMOTE   [facility] ([facility])    [address]  [address]  [facility] [id number] [phone]            Apr 02, 2019 10:15 AM CDT   LAB with  LAB   [facility]    [address]  1st Floor  [facility] [id number] [phone]           Please do not eat 10-12 hours before your appointment if you are coming in fasting for labs on lipids, cholesterol, or glucose (sugar). This does not apply to pregnant women. Water, hot tea and black coffee (with nothing added) are okay. Do not drink other fluids, diet soda or chew gum.            Apr 02, 2019 11:00 AM CDT   CT CHEST/ABDOMEN/PELVIS W CONTRAST with UCCT2   [facility] CT ([facility])    [address]  [address] [facility] [address] [phone]           How do I prepare for my exam? (Food and drink instructions) To prepare: Do not eat or drink for 2 hours before your exam. If you need to take medicine, you may take it with small sips of water. (We may ask you to take liquid medicine as well.)  How do I prepare for my exam? (Other instructions) Please arrive 30 minutes early for your CT.  Once in the department you might be asked to drink water 15-20 minutes prior to your exam.  If indicated you may be asked to drink an oral contrast in advance of your CT.  If this is the case, the imaging team will let you know or be in contact with you prior to your appointment  Patients over 70 or patients with diabetes or kidney problems: If you haven t had a blood test (creatinine test) within the last 30 days, the Cardiologist/Radiologist may require you to get this test prior to your exam.  If you have diabetes:  Continue to take your metformin medication on the day of your exam  What should I wear: Please wear loose clothing, such as a sweat suit or jogging clothes. Avoid snaps, zippers and other metal. We may ask you to undress and put on a hospital gown.  How long does the exam take: Most scans take less than 20 minutes.  What should I bring: Please bring any scans or X-rays taken at other hospitals, if similar tests were done. Also bring a  list of your medicines, including vitamins, minerals and over-the-counter drugs. It is safest to leave personal items at home.  Do I need a : No  is needed.  What do I need to tell my doctor? Be sure to tell your doctor: * If you have any allergies. * If there s any chance you are pregnant. * If you are breastfeeding.  What should I do after the exam: No restrictions, You may resume normal activities.  What is this test: A CT (computed tomography) scan is a series of pictures that allows us to look inside your body. The scanner creates images of the body in cross sections, much like slices of bread. This helps us see any problems more clearly. You may receive contrast (X-ray dye) before or during your scan. You will be asked to drink the contrast.  Who should I call with questions: If you have any questions, please call the Imaging Department where you will have your exam. Directions, parking instructions, and other information is available on our website, [email or web address].            Apr 08, 2019 10:45 AM CDT   (Arrive by 10:30 AM)   Return Visit with Francisco Gilliam MD   [facility] ([facility])    [address] [place] [address]  [address]  [facility] [id number] [phone]            May 28, 2019  3:00 PM CDT   (Arrive by 2:45 PM)   Return Vascular Visit with Frida Desai MD   [facility] ([facility])    [address] [place] [address]  [address]  [facility] [id number] [phone]              Future tests that were ordered for you today     Open Future Orders        Priority Expected Expires Ordered    EP ICD/Pacemaker/Loop Recorder Routine  12/3/2019 12/3/2018    Cardiac Device Check - Remote Routine  12/2/2020 12/2/2018            Who to contact     Please call your clinic at No information on file. to:    Ask questions about your health    Make or cancel appointments    Discuss your medicines    Learn about your  test results    Speak to your doctor            Additional Information About Your Visit        [employer] Information     [employer] gives you secure access to your electronic health record. If you see a primary care provider, you can also send messages to your care team and make appointments. If you have questions, please call your primary care clinic.  If you do not have a primary care provider, please call [phone] and they will assist you.      [employer] is an electronic gateway that provides easy, online access to your medical records. With [employer], you can request a clinic appointment, read your test results, renew a prescription or communicate with your care team.     To access your existing account, please contact your [facility] or call [phone] for assistance.        Care EveryWhere ID     This is your Care EveryWhere ID. This could be used by other organizations to access your [place] medical records  [id number]         Blood Pressure from Last 3 Encounters:   12/03/18 122/74   11/27/18 127/80   11/14/18 132/80    Weight from Last 3 Encounters:   12/03/18 67.4 kg (148 lb 8 oz)   11/27/18 68.7 kg (151 lb 8 oz)   11/14/18 66.4 kg (146 lb 4.8 oz)              We Performed the Following     ICD DEVICE PROGRAMMING EVAL, DUAL LEAD ICD          Today's Medication Changes          These changes are accurate as of 12/3/18  6:31 PM.  If you have any questions, ask your nurse or doctor.               These medicines have changed or have updated prescriptions.        Dose/Directions    * warfarin 5 MG tablet   Commonly known as:  COUMADIN   This may have changed:  additional instructions   Used for:  Atrial flutter (H)        7.5 mg on Wed; 5 mg all other days  or as instructed by coumadin clinic.   Quantity:  35 tablet   Refills:  5       * warfarin 5 MG tablet   Commonly known as:  COUMADIN   This may have changed:  Another medication with the same name was changed. Make sure you  understand how and when to take each.   Used for:  Paroxysmal atrial fibrillation (H), Long-term (current) use of anticoagulants        Take 1 tablet by mouth daily or as directed by the Coumadin clinic.   Quantity:  90 tablet   Refills:  3       * Notice:  This list has 2 medication(s) that are the same as other medications prescribed for you. Read the directions carefully, and ask your doctor or other care provider to review them with you.             Primary Care Provider Office Phone # Fax #    Roberto Sarmiento [phone] [address] [address]        Equal Access to Services     [facility]: Hadii giovanny olsen hadasho Solali, waaxda luqadaha, qaybta kaalmada aicha, sil burden . So [facility] [phone].    ATENCIÓN: Si habla español, tiene a aguirre disposición servicios gratuitos de asistencia lingüística. [facility] [phone].    We comply with applicable federal civil rights laws and [place] laws. We do not discriminate on the basis of race, color, national origin, age, disability, sex, sexual orientation, or gender identity.            Thank you!     Thank you for choosing [facility]  for your care. Our goal is always to provide you with excellent care. Hearing back from our patients is one way we can continue to improve our services. Please take a few minutes to complete the written survey that you may receive in the mail after your visit with us. Thank you!             Your Updated Medication List - Protect others around you: Learn how to safely use, store and throw away your medicines at www.disposemymeds.org.          This list is accurate as of 12/3/18  6:31 PM.  Always use your most recent med list.                   Brand Name Dispense Instructions for use Diagnosis    aspirin 81 MG tablet    ASA     Take 1 tablet by mouth daily.        atorvastatin 40 MG tablet    LIPITOR    100 tablet    Take 1 tablet (40 mg) by mouth  daily    Hyperlipidemia, unspecified hyperlipidemia type       bacitracin 500 UNIT/GM external ointment     28 g    Apply topically 2 times daily APPLY TO LEFT LEG TWO TIMES PER DAY    Left leg cellulitis       Blood Pressure Monitor Kit     1 kit    1 Units daily    Hypertension       ciclopirox 0.77 % cream    LOPROX    90 g    Apply topically 2 times daily To feet and toenails.    Dermatophytosis of nail, Tinea pedis of both feet       doxazosin 2 MG tablet    CARDURA    90 tablet    Take 1/2 tab in the morning and 1/2 tab in the evening    Essential hypertension       fluticasone 50 MCG/ACT nasal spray    FLONASE    3 Package    Spray 2 sprays into both nostrils daily    Unspecified sinusitis (chronic)       ketoconazole 2 % external cream    NIZORAL    60 g    Apply topically daily On hold    Tinea corporis       loratadine 10 MG tablet    CLARITIN     Take 10 mg by mouth as needed        metoprolol tartrate 25 MG tablet    LOPRESSOR    180 tablet    Take 1 tablet (25 mg) by mouth 2 times daily    Coronary artery disease involving native heart without angina pectoris, unspecified vessel or lesion type       mirtazapine 15 MG tablet    REMERON     Take 15 mg by mouth At Bedtime.        multivitamin tablet      Take 1 tablet by mouth daily. AM        order for DME     1 Units    20-30 mm Hg knee length compression stockings.  Measure and fit.  Style and color per patient preference.  Pat Hsu per patient need.    PVD (peripheral vascular disease) (H), Ulcer of left lower extremity, limited to breakdown of skin (H)       triamcinolone 0.1 % external cream    KENALOG    80 g    Apply topically 2 times daily For up to two weeks in a row    Atopic eczema       vitamin B-12 1000 MCG tablet    CYANOCOBALAMIN    180 tablet    Take 2 tablets (2,000 mcg) by mouth daily    Anemia       VITAMIN D-3 PO      Take 1 tablet by mouth 2 times daily        * warfarin 5 MG tablet    COUMADIN    35 tablet    7.5 mg on Wed; 5 mg  all other days  or as instructed by coumadin clinic.    Atrial flutter (H)       * warfarin 5 MG tablet    COUMADIN    90 tablet    Take 1 tablet by mouth daily or as directed by the Coumadin clinic.    Paroxysmal atrial fibrillation (H), Long-term (current) use of anticoagulants       ZOLOFT 100 MG tablet   Generic drug:  sertraline      Take 100 mg by mouth every evening.        * Notice:  This list has 2 medication(s) that are the same as other medications prescribed for you. Read the directions carefully, and ask your doctor or other care provider to review them with you.       Yes

## 2022-09-18 NOTE — ED PROVIDER NOTE - PROGRESS NOTE DETAILS
WASHINGTON MARTE: Notified by CT tech that patient's right brachial peripheral IV line infiltrated during CT IV contrast scan (approx 60 ccs). Will monitor patient for signs of compartment syndrome and reassess. WASHINGTON MARTE: Surgery is aware of patient's SBO found on CT scan.

## 2022-09-18 NOTE — ED PROVIDER NOTE - ATTENDING CONTRIBUTION TO CARE
HPI: 68 y/o female w/ Past Medical History HTN w/ PSH multiple SBOs (14x) requiring diagnostic lap, c-sections, left partial nephrectomy c/o 1 day history of increasing diffuse abdominal pain that feels similar to prior SBOs requiring surgery. Denies fevers, chills, nausea, vomiting, dizziness, chest pain, SOB, dysuria, hematuria.  EXAM: Uncomfortable, vomiting, abd distended, tenderness to palpation diffusely but mostly periumbilical.   MDM: pt with multiple medical problems including multiple SBOs that is coming in for diffuse abd pain and initially denied vomiting but now in ED is vomiting during exam. ALso reports passing gas but last BM was approx 14 hours prior to arrival. Will obtain labs and CT imaging. Due to pt history and vomiting, will place NGT in ED now prophlactically to help prevent aspiration and pain and N/V. Pt amenable to plan.

## 2022-09-18 NOTE — H&P ADULT - HISTORY OF PRESENT ILLNESS
68 y/o female w/ PMH of HTN and Hypothyroidism and w/ PSH of c-sections, left partial nephrectomy and SBOs managed nonoperatively in the past presents with 1 day history of increasing diffuse abdominal pain that feels similar to prior SBOs. She reports intermittent nausea but no vomiting. Reports flatus and BM 2 days ago. Denies fevers, chills, nausea, vomiting, dizziness, chest pain, SOB, dysuria, hematuria.    In the ED, patient is afebrile, hemodynamically stable and non-toxic appearing.

## 2022-09-18 NOTE — ED PROVIDER NOTE - WET READ LAUNCH FT
There are no Wet Read(s) to document.
I will SWITCH the dose or number of times a day I take the medications listed below when I get home from the hospital:  None

## 2022-09-18 NOTE — H&P ADULT - ATTENDING COMMENTS
Above noted. History obtained, the patient was examined.  Developed colicky abdominal pain yesterday morning, followed by nausea and emesis. Last bowel movement yesterday at 2PM. No flatus or bowel movement since. had an episode in March of this year that resolved without surgical intervention.  Physical Exam: Vital signs are stable. Afebrile.  Abdomen: Obese, soft, tender mostly over the james-umbilical region and the right para-umbilical area.  labs: As charted. Lactate is pending.  CT Scan: consistent with a small bowel obstruction.  Plan: NPO. IV fluids, DVT prophylaxis, no analgesics, close monitoring.

## 2022-09-19 ENCOUNTER — NON-APPOINTMENT (OUTPATIENT)
Age: 67
End: 2022-09-19

## 2022-09-19 LAB
ANION GAP SERPL CALC-SCNC: 12 MMOL/L — SIGNIFICANT CHANGE UP (ref 7–14)
BUN SERPL-MCNC: 14 MG/DL — SIGNIFICANT CHANGE UP (ref 7–23)
CALCIUM SERPL-MCNC: 9 MG/DL — SIGNIFICANT CHANGE UP (ref 8.4–10.5)
CHLORIDE SERPL-SCNC: 100 MMOL/L — SIGNIFICANT CHANGE UP (ref 98–107)
CO2 SERPL-SCNC: 24 MMOL/L — SIGNIFICANT CHANGE UP (ref 22–31)
CREAT SERPL-MCNC: 1.16 MG/DL — SIGNIFICANT CHANGE UP (ref 0.5–1.3)
EGFR: 51 ML/MIN/1.73M2 — LOW
GLUCOSE SERPL-MCNC: 85 MG/DL — SIGNIFICANT CHANGE UP (ref 70–99)
HCT VFR BLD CALC: 35.7 % — SIGNIFICANT CHANGE UP (ref 34.5–45)
HGB BLD-MCNC: 11.3 G/DL — LOW (ref 11.5–15.5)
MAGNESIUM SERPL-MCNC: 1.8 MG/DL — SIGNIFICANT CHANGE UP (ref 1.6–2.6)
MCHC RBC-ENTMCNC: 29.8 PG — SIGNIFICANT CHANGE UP (ref 27–34)
MCHC RBC-ENTMCNC: 31.7 GM/DL — LOW (ref 32–36)
MCV RBC AUTO: 94.2 FL — SIGNIFICANT CHANGE UP (ref 80–100)
NRBC # BLD: 0 /100 WBCS — SIGNIFICANT CHANGE UP (ref 0–0)
NRBC # FLD: 0 K/UL — SIGNIFICANT CHANGE UP (ref 0–0)
PHOSPHATE SERPL-MCNC: 3 MG/DL — SIGNIFICANT CHANGE UP (ref 2.5–4.5)
PLATELET # BLD AUTO: 277 K/UL — SIGNIFICANT CHANGE UP (ref 150–400)
POTASSIUM SERPL-MCNC: 3.3 MMOL/L — LOW (ref 3.5–5.3)
POTASSIUM SERPL-SCNC: 3.3 MMOL/L — LOW (ref 3.5–5.3)
RBC # BLD: 3.79 M/UL — LOW (ref 3.8–5.2)
RBC # FLD: 16.2 % — HIGH (ref 10.3–14.5)
SODIUM SERPL-SCNC: 136 MMOL/L — SIGNIFICANT CHANGE UP (ref 135–145)
WBC # BLD: 5.44 K/UL — SIGNIFICANT CHANGE UP (ref 3.8–10.5)
WBC # FLD AUTO: 5.44 K/UL — SIGNIFICANT CHANGE UP (ref 3.8–10.5)

## 2022-09-19 RX ORDER — PANTOPRAZOLE SODIUM 20 MG/1
40 TABLET, DELAYED RELEASE ORAL
Refills: 0 | Status: DISCONTINUED | OUTPATIENT
Start: 2022-09-19 | End: 2022-09-20

## 2022-09-19 RX ORDER — INFLUENZA VIRUS VACCINE 15; 15; 15; 15 UG/.5ML; UG/.5ML; UG/.5ML; UG/.5ML
0.7 SUSPENSION INTRAMUSCULAR ONCE
Refills: 0 | Status: COMPLETED | OUTPATIENT
Start: 2022-09-19 | End: 2022-09-19

## 2022-09-19 RX ORDER — LEVOTHYROXINE SODIUM 125 MCG
200 TABLET ORAL DAILY
Refills: 0 | Status: DISCONTINUED | OUTPATIENT
Start: 2022-09-19 | End: 2022-09-20

## 2022-09-19 RX ORDER — SODIUM CHLORIDE 9 MG/ML
1000 INJECTION, SOLUTION INTRAVENOUS
Refills: 0 | Status: DISCONTINUED | OUTPATIENT
Start: 2022-09-19 | End: 2022-09-20

## 2022-09-19 RX ORDER — POTASSIUM CHLORIDE 20 MEQ
10 PACKET (EA) ORAL
Refills: 0 | Status: COMPLETED | OUTPATIENT
Start: 2022-09-19 | End: 2022-09-19

## 2022-09-19 RX ORDER — SODIUM CHLORIDE 9 MG/ML
1000 INJECTION, SOLUTION INTRAVENOUS
Refills: 0 | Status: DISCONTINUED | OUTPATIENT
Start: 2022-09-19 | End: 2022-09-19

## 2022-09-19 RX ORDER — MAGNESIUM SULFATE 500 MG/ML
2 VIAL (ML) INJECTION ONCE
Refills: 0 | Status: COMPLETED | OUTPATIENT
Start: 2022-09-19 | End: 2022-09-19

## 2022-09-19 RX ADMIN — HEPARIN SODIUM 5000 UNIT(S): 5000 INJECTION INTRAVENOUS; SUBCUTANEOUS at 02:31

## 2022-09-19 RX ADMIN — Medication 100 MILLIEQUIVALENT(S): at 13:13

## 2022-09-19 RX ADMIN — Medication 100 MILLIEQUIVALENT(S): at 12:08

## 2022-09-19 RX ADMIN — Medication 25 GRAM(S): at 11:14

## 2022-09-19 RX ADMIN — HEPARIN SODIUM 5000 UNIT(S): 5000 INJECTION INTRAVENOUS; SUBCUTANEOUS at 09:53

## 2022-09-19 RX ADMIN — SODIUM CHLORIDE 50 MILLILITER(S): 9 INJECTION, SOLUTION INTRAVENOUS at 19:28

## 2022-09-19 RX ADMIN — Medication 1000 MILLIGRAM(S): at 00:30

## 2022-09-19 RX ADMIN — HEPARIN SODIUM 5000 UNIT(S): 5000 INJECTION INTRAVENOUS; SUBCUTANEOUS at 18:07

## 2022-09-19 RX ADMIN — PANTOPRAZOLE SODIUM 40 MILLIGRAM(S): 20 TABLET, DELAYED RELEASE ORAL at 12:07

## 2022-09-19 RX ADMIN — Medication 400 MILLIGRAM(S): at 00:04

## 2022-09-19 RX ADMIN — SODIUM CHLORIDE 100 MILLILITER(S): 9 INJECTION, SOLUTION INTRAVENOUS at 11:14

## 2022-09-19 RX ADMIN — Medication 100 MILLIEQUIVALENT(S): at 11:14

## 2022-09-19 NOTE — PROGRESS NOTE ADULT - SUBJECTIVE AND OBJECTIVE BOX
Surgery [facility] B Team Daily Progress Note   CODI TAVERAS | [id number]  --------------------------------------------------------------------------------------------------------------------  SUBJECTIVE / 24H EVENTS  Patient seen and examined on morning rounds. Received 1x Tylenol iso of pain, otherwise asymptomatic, v/s stable.  --------------------------------------------------------------------------------------------------------------------  OBJECTIVE:    VITAL SIGNS:  T(C): 36.9 (09-19-22 @ 00:02), Max: 37 (09-18-22 @ 07:06)  HR: 58 (09-19-22 @ 00:02) (54 - 76)  BP: 124/65 (09-19-22 @ 00:02) (121/71 - 162/82)  RR: 20 (09-19-22 @ 00:02) (17 - 20)  SpO2: 97% (09-19-22 @ 00:02) (97% - 100%)  Daily Height in cm: 170.2 (18 Sep 2022 19:30)    Daily       PHYSICAL EXAM:  Gen: NAD  LS: Respirations unlabored.   Card: RRR.   GI: ********************************************  Ext: Warm, well perfused      09-18-22 @ 07:01 - 09-19-22 @ 02:05  --------------------------------------------------------  IN:    Lactated Ringers: 1000 mL  Total IN: 1000 mL    OUT:    Nasogastric/Oral tube (mL): 100 mL    Oral Fluid: 0 mL    Voided (mL): 350 mL  Total OUT: 450 mL    Total NET: 550 mL          LAB VALUES:  09-18    137  |  98  |  14  ----------------------------<  93  4.5   |  23  |  1.20    Ca    10.3      18 Sep 2022 03:35    TPro  9.6<H>  /  Alb  4.6  /  TBili  0.5  /  DBili  x   /  AST  32  /  ALT  11  /  AlkPhos  61  09-18                               13.1   7.74  )-----------( 302      ( 18 Sep 2022 03:35 )             41.8     LIVER FUNCTIONS - ( 18 Sep 2022 03:35 )  Alb: 4.6 g/dL / Pro: 9.6 g/dL / ALK PHOS: 61 U/L / ALT: 11 U/L / AST: 32 U/L / GGT: x           PT/INR - ( 18 Sep 2022 03:35 )   PT: 12.2 sec;   INR: 1.05 ratio         PTT - ( 18 Sep 2022 03:35 )  PTT:32.3 sec            MICROBIOLOGY:    No new microbiology data for review.     RADIOLOGY:  PACS Image: Image(s) Available (09-18-22 @ 05:26)  PACS Image: Image(s) Available (09-18-22 @ 05:19)  PACS Image: Image(s) Available (09-18-22 @ 03:16)    No new radiographic images for review.    MEDICATIONS  (STANDING):  heparin   Injectable 5000 Unit(s) SubCutaneous every 8 hours  influenza  Vaccine (HIGH DOSE) 0.7 milliLiter(s) IntraMuscular once  lactated ringers. 1000 milliLiter(s) (125 mL/Hr) IV Continuous <Continuous>  levothyroxine Injectable 75 MICROGram(s) IV Push at bedtime  metoprolol tartrate Injectable 5 milliGRAM(s) IV Push every 6 hours  pantoprazole  Injectable 40 milliGRAM(s) IV Push daily    MEDICATIONS  (PRN):      Surgery [facility] Team Daily Progress Note   CODI TAVERAS | [id number]  --------------------------------------------------------------------------------------------------------------------  SUBJECTIVE / 24H EVENTS  Patient seen and examined on morning rounds. Received 1x Tylenol iso of pain, otherwise asymptomatic, v/s stable.  +/- bowel function.  Patient reports that she feels well this morning, improved much from yesterday.     --------------------------------------------------------------------------------------------------------------------  OBJECTIVE:    VITAL SIGNS:  T(C): 36.9 (09-19-22 @ 00:02), Max: 37 (09-18-22 @ 07:06)  HR: 58 (09-19-22 @ 00:02) (54 - 76)  BP: 124/65 (09-19-22 @ 00:02) (121/71 - 162/82)  RR: 20 (09-19-22 @ 00:02) (17 - 20)  SpO2: 97% (09-19-22 @ 00:02) (97% - 100%)  Daily Height in cm: 170.2 (18 Sep 2022 19:30)    Daily       PHYSICAL EXAM:  Gen: NAD  LS: Respirations unlabored.   Card: RRR.   GI: ********************************************  Ext: Warm, well perfused      09-18-22 @ 07:01  -  09-19-22 @ 02:05  --------------------------------------------------------  IN:    Lactated Ringers: 1000 mL  Total IN: 1000 mL    OUT:    Nasogastric/Oral tube (mL): 100 mL    Oral Fluid: 0 mL    Voided (mL): 350 mL  Total OUT: 450 mL    Total NET: 550 mL          LAB VALUES:  09-18    137  |  98  |  14  ----------------------------<  93  4.5   |  23  |  1.20    Ca    10.3      18 Sep 2022 03:35    TPro  9.6<H>  /  Alb  4.6  /  TBili  0.5  /  DBili  x   /  AST  32  /  ALT  11  /  AlkPhos  61  09-18                               13.1   7.74  )-----------( 302      ( 18 Sep 2022 03:35 )             41.8     LIVER FUNCTIONS - ( 18 Sep 2022 03:35 )  Alb: 4.6 g/dL / Pro: 9.6 g/dL / ALK PHOS: 61 U/L / ALT: 11 U/L / AST: 32 U/L / GGT: x           PT/INR - ( 18 Sep 2022 03:35 )   PT: 12.2 sec;   INR: 1.05 ratio         PTT - ( 18 Sep 2022 03:35 )  PTT:32.3 sec            MICROBIOLOGY:    No new microbiology data for review.     RADIOLOGY:  PACS Image: Image(s) Available (09-18-22 @ 05:26)  PACS Image: Image(s) Available (09-18-22 @ 05:19)  PACS Image: Image(s) Available (09-18-22 @ 03:16)    No new radiographic images for review.    MEDICATIONS  (STANDING):  heparin   Injectable 5000 Unit(s) SubCutaneous every 8 hours  influenza  Vaccine (HIGH DOSE) 0.7 milliLiter(s) IntraMuscular once  lactated ringers. 1000 milliLiter(s) (125 mL/Hr) IV Continuous <Continuous>  levothyroxine Injectable 75 MICROGram(s) IV Push at bedtime  metoprolol tartrate Injectable 5 milliGRAM(s) IV Push every 6 hours  pantoprazole  Injectable 40 milliGRAM(s) IV Push daily    MEDICATIONS  (PRN):

## 2022-09-19 NOTE — PROGRESS NOTE ADULT - ATTENDING COMMENTS
Above noted. Feels much better, denies pain, nausea or emesis. Passing a small amount of flatus, had a bowel movement.  Physical Exam: afebrile.  Abdomen: Soft, non-tender.  Labs: WBC is normal.  Plan: Advance to a clear liquid diet, close monitoring.

## 2022-09-19 NOTE — PROGRESS NOTE ADULT - ASSESSMENT
68 y/o female w/ PMH of HTN and Hypothyroidism and w/ PSH of c-sections, left partial nephrectomy and SBOs managed nonoperatively in the past presents with 1 day history of increasing diffuse abdominal pain that feels similar to prior SBOs, and CT findings of dilated loops of small bowel and transition point in the LLQ similar to the previous one 2 years ago.    Plan:  - Admit to B team Surgery under Dr. Mcdonough, floor bed  - NPO/NGT/IVF   - serial abdominal exams  - repeat blood lactate  - no narcotic pain medications   - hold all po meds  - DVT ppx     d/w Dr. Mcdonough    B Team Surgery  [id number]   66 y/o female w/ PMH of HTN and Hypothyroidism and w/ PSH of c-sections, left partial nephrectomy and SBOs managed nonoperatively in the past presents with 1 day history of increasing diffuse abdominal pain that feels similar to prior SBOs, and CT findings of dilated loops of small bowel and transition point in the LLQ similar to the previous one 2 years ago, now s/p NGT decompression with ROBF.    Plan:  - dc NGT  - NPO sips/chips  - hold all po meds  - DVT ppx     B Team Surgery  [id number]

## 2022-09-20 ENCOUNTER — TRANSCRIPTION ENCOUNTER (OUTPATIENT)
Age: 67
End: 2022-09-20

## 2022-09-20 VITALS
TEMPERATURE: 99 F | OXYGEN SATURATION: 100 % | SYSTOLIC BLOOD PRESSURE: 138 MMHG | DIASTOLIC BLOOD PRESSURE: 74 MMHG | RESPIRATION RATE: 18 BRPM | HEART RATE: 65 BPM

## 2022-09-20 LAB
ALBUMIN SERPL ELPH-MCNC: 3.3 G/DL — SIGNIFICANT CHANGE UP (ref 3.3–5)
ALP SERPL-CCNC: 45 U/L — SIGNIFICANT CHANGE UP (ref 40–120)
ALT FLD-CCNC: 7 U/L — SIGNIFICANT CHANGE UP (ref 4–33)
ANION GAP SERPL CALC-SCNC: 8 MMOL/L — SIGNIFICANT CHANGE UP (ref 7–14)
AST SERPL-CCNC: 13 U/L — SIGNIFICANT CHANGE UP (ref 4–32)
BILIRUB DIRECT SERPL-MCNC: <0.2 MG/DL — SIGNIFICANT CHANGE UP (ref 0–0.3)
BILIRUB INDIRECT FLD-MCNC: >0.2 MG/DL — SIGNIFICANT CHANGE UP (ref 0–1)
BILIRUB SERPL-MCNC: 0.4 MG/DL — SIGNIFICANT CHANGE UP (ref 0.2–1.2)
BUN SERPL-MCNC: 11 MG/DL — SIGNIFICANT CHANGE UP (ref 7–23)
CALCIUM SERPL-MCNC: 8.9 MG/DL — SIGNIFICANT CHANGE UP (ref 8.4–10.5)
CHLORIDE SERPL-SCNC: 107 MMOL/L — SIGNIFICANT CHANGE UP (ref 98–107)
CO2 SERPL-SCNC: 27 MMOL/L — SIGNIFICANT CHANGE UP (ref 22–31)
CREAT SERPL-MCNC: 1.21 MG/DL — SIGNIFICANT CHANGE UP (ref 0.5–1.3)
EGFR: 49 ML/MIN/1.73M2 — LOW
GLUCOSE SERPL-MCNC: 77 MG/DL — SIGNIFICANT CHANGE UP (ref 70–99)
HCT VFR BLD CALC: 33.2 % — LOW (ref 34.5–45)
HGB BLD-MCNC: 10.5 G/DL — LOW (ref 11.5–15.5)
MAGNESIUM SERPL-MCNC: 2.1 MG/DL — SIGNIFICANT CHANGE UP (ref 1.6–2.6)
MCHC RBC-ENTMCNC: 30 PG — SIGNIFICANT CHANGE UP (ref 27–34)
MCHC RBC-ENTMCNC: 31.6 GM/DL — LOW (ref 32–36)
MCV RBC AUTO: 94.9 FL — SIGNIFICANT CHANGE UP (ref 80–100)
NRBC # BLD: 0 /100 WBCS — SIGNIFICANT CHANGE UP (ref 0–0)
NRBC # FLD: 0 K/UL — SIGNIFICANT CHANGE UP (ref 0–0)
PHOSPHATE SERPL-MCNC: 2.7 MG/DL — SIGNIFICANT CHANGE UP (ref 2.5–4.5)
PLATELET # BLD AUTO: 263 K/UL — SIGNIFICANT CHANGE UP (ref 150–400)
POTASSIUM SERPL-MCNC: 3.3 MMOL/L — LOW (ref 3.5–5.3)
POTASSIUM SERPL-SCNC: 3.3 MMOL/L — LOW (ref 3.5–5.3)
PROT SERPL-MCNC: 7.2 G/DL — SIGNIFICANT CHANGE UP (ref 6–8.3)
RBC # BLD: 3.5 M/UL — LOW (ref 3.8–5.2)
RBC # FLD: 16.1 % — HIGH (ref 10.3–14.5)
SODIUM SERPL-SCNC: 142 MMOL/L — SIGNIFICANT CHANGE UP (ref 135–145)
WBC # BLD: 6.86 K/UL — SIGNIFICANT CHANGE UP (ref 3.8–10.5)
WBC # FLD AUTO: 6.86 K/UL — SIGNIFICANT CHANGE UP (ref 3.8–10.5)

## 2022-09-20 RX ORDER — POTASSIUM CHLORIDE 20 MEQ
10 PACKET (EA) ORAL
Refills: 0 | Status: DISCONTINUED | OUTPATIENT
Start: 2022-09-20 | End: 2022-09-20

## 2022-09-20 RX ORDER — POTASSIUM CHLORIDE 20 MEQ
10 PACKET (EA) ORAL
Refills: 0 | Status: COMPLETED | OUTPATIENT
Start: 2022-09-20 | End: 2022-09-20

## 2022-09-20 RX ADMIN — SODIUM CHLORIDE 50 MILLILITER(S): 9 INJECTION, SOLUTION INTRAVENOUS at 06:27

## 2022-09-20 RX ADMIN — Medication 100 MILLIEQUIVALENT(S): at 17:19

## 2022-09-20 RX ADMIN — Medication 200 MICROGRAM(S): at 06:27

## 2022-09-20 RX ADMIN — HEPARIN SODIUM 5000 UNIT(S): 5000 INJECTION INTRAVENOUS; SUBCUTANEOUS at 01:23

## 2022-09-20 RX ADMIN — PANTOPRAZOLE SODIUM 40 MILLIGRAM(S): 20 TABLET, DELAYED RELEASE ORAL at 06:27

## 2022-09-20 RX ADMIN — Medication 100 MILLIEQUIVALENT(S): at 15:35

## 2022-09-20 RX ADMIN — Medication 100 MILLIEQUIVALENT(S): at 13:38

## 2022-09-20 NOTE — DISCHARGE NOTE NURSING/CASE MANAGEMENT/SOCIAL WORK - NSDCPEFALRISK_GEN_ALL_CORE
For information on Fall & Injury Prevention, visit: [email or web address] [place]  [email or web address] [place]  https://www.cdc.gov/steadi/patient.html

## 2022-09-20 NOTE — DISCHARGE NOTE PROVIDER - HOSPITAL COURSE
66 y/o female w/ PMH of HTN and Hypothyroidism and w/ PSH of c-sections, left partial nephrectomy and SBOs managed nonoperatively in the past presents with 1 day history of increasing diffuse abdominal pain that feels similar to prior SBOs, and CT findings of dilated loops of small bowel and transition point in the LLQ similar to the previous one 2 years ago, now s/p NGT decompression with ROBF. Patient was admitted with a small bowel obstruction and treated conservatively with NGT decompression and bowel rest. The patient had return of bowel function. heir diet was then slowly advanced as tolerated. Once patient was tolerating regular diet, voiding and ambulating without difficulty, they were found to be stable for discharge to home.

## 2022-09-20 NOTE — PROGRESS NOTE ADULT - ASSESSMENT
66 y/o female w/ PMH of HTN and Hypothyroidism and w/ PSH of c-sections, left partial nephrectomy and SBOs managed nonoperatively in the past presents with 1 day history of increasing diffuse abdominal pain that feels similar to prior SBOs, and CT findings of dilated loops of small bowel and transition point in the LLQ similar to the previous one 2 years ago, now s/p NGT decompression with ROBF.    Plan:  - dc NGT  - NPO sips/chips  - hold all po meds  - DVT ppx     B Team Surgery  [id number] 68 y/o female w/ PMH of HTN and Hypothyroidism and w/ PSH of c-sections, left partial nephrectomy and SBOs managed nonoperatively in the past presents with 1 day history of increasing diffuse abdominal pain that feels similar to prior SBOs, and CT findings of dilated loops of small bowel and transition point in the LLQ similar to the previous one 2 years ago, now s/p NGT decompression with ROBF.    Plan:  - Regular diet. pt well tolerating   - dispo planning     B Team Surgery  [id number]

## 2022-09-20 NOTE — PROGRESS NOTE ADULT - ATTENDING COMMENTS
Feels well. Denies abdominal; pain, tolerating a regular diet, passing flatus, had a bowel movement,  Physical Exam: Afebrile.  Abdomen: Soft, non-tender.  Plan: Discharge today Feels well. Denies abdominal; pain, tolerating a regular diet, passing flatus, had a bowel movement,  Physical Exam: Afebrile.  Abdomen: Soft, non-tender.  Plan: Discharge today.

## 2022-09-20 NOTE — DISCHARGE NOTE PROVIDER - NSDCCPCAREPLAN_GEN_ALL_CORE_FT
PRINCIPAL DISCHARGE DIAGNOSIS  Diagnosis: SBO (small bowel obstruction)  Assessment and Plan of Treatment: DIET: Return to your usual diet.  NOTIFY YOUR SURGEON IF YOU HAVE: any fever (over 100.4 F) persistent nausea/vomiting, or if your pain is not controlled on your discharge pain medications, unable to urinate.  Please follow up with your primary care physician in one week regarding your hospitalization, bring copies of your discharge paperwork.  Please follow up with your surgeon, Dr. Mcdonough within 1-2 weeks of discharge.

## 2022-09-20 NOTE — PROGRESS NOTE ADULT - SUBJECTIVE AND OBJECTIVE BOX
Surgery [facility] B Team Daily Progress Note   CODI TAVERAS | MRN-[id number]  --------------------------------------------------------------------------------------------------------------------  SUBJECTIVE / 24H EVENTS  Patient seen and examined on morning rounds. No acute events overnight.  --------------------------------------------------------------------------------------------------------------------  OBJECTIVE:    VITAL SIGNS:  T(C): 36.4 (09-20-22 @ 01:10), Max: 37.2 (09-19-22 @ 17:42)  HR: 54 (09-20-22 @ 01:10) (54 - 63)  BP: 132/70 (09-20-22 @ 01:10) (121/70 - 147/70)  RR: 18 (09-20-22 @ 01:10) (16 - 18)  SpO2: 99% (09-20-22 @ 01:10) (94% - 100%)  Daily     Daily       PHYSICAL EXAM:  Gen: NAD  LS: Respirations unlabored.   Card: RRR.   GI: *************************************  Ext: Warm, well perfused      09-18-22 @ 07:01  -  09-19-22 @ 07:00  --------------------------------------------------------  IN:    IV PiggyBack: 100 mL    Lactated Ringers: 1500 mL  Total IN: 1600 mL    OUT:    Nasogastric/Oral tube (mL): 150 mL    Oral Fluid: 0 mL    Voided (mL): 700 mL  Total OUT: 850 mL    Total NET: 750 mL      09-19-22 @ 07:01  -  09-20-22 @ 01:55  --------------------------------------------------------  IN:    IV PiggyBack: 300 mL    Lactated Ringers: 500 mL    Lactated Ringers: 500 mL    Oral Fluid: 540 mL  Total IN: 1840 mL    OUT:    Nasogastric/Oral tube (mL): 100 mL    Voided (mL): 1150 mL  Total OUT: 1250 mL    Total NET: 590 mL          LAB VALUES:  09-19    136  |  100  |  14  ----------------------------<  85  3.3<L>   |  24  |  1.16    Ca    9.0      19 Sep 2022 05:36  Phos  3.0     09-19  Mg     1.80     09-19    TPro  9.6<H>  /  Alb  4.6  /  TBili  0.5  /  DBili  x   /  AST  32  /  ALT  11  /  AlkPhos  61  09-18                               11.3   5.44  )-----------( 277      ( 19 Sep 2022 05:36 )             35.7     LIVER FUNCTIONS - ( 18 Sep 2022 03:35 )  Alb: 4.6 g/dL / Pro: 9.6 g/dL / ALK PHOS: 61 U/L / ALT: 11 U/L / AST: 32 U/L / GGT: x           PT/INR - ( 18 Sep 2022 03:35 )   PT: 12.2 sec;   INR: 1.05 ratio         PTT - ( 18 Sep 2022 03:35 )  PTT:32.3 sec            MICROBIOLOGY:    No new microbiology data for review.     RADIOLOGY:    No new radiographic images for review.    MEDICATIONS  (STANDING):  heparin   Injectable 5000 Unit(s) SubCutaneous every 8 hours  influenza  Vaccine (HIGH DOSE) 0.7 milliLiter(s) IntraMuscular once  lactated ringers. 1000 milliLiter(s) (50 mL/Hr) IV Continuous <Continuous>  levothyroxine 200 MICROGram(s) Oral daily  metoprolol tartrate Injectable 5 milliGRAM(s) IV Push every 6 hours  pantoprazole    Tablet 40 milliGRAM(s) Oral before breakfast    MEDICATIONS  (PRN):      Surgery [facility] B Team Daily Progress Note   CODI TAVERAS | MRN-[id number]  --------------------------------------------------------------------------------------------------------------------  SUBJECTIVE / 24H EVENTS  Patient seen and examined on morning rounds. No acute events overnight.  --------------------------------------------------------------------------------------------------------------------  OBJECTIVE:    VITAL SIGNS:  T(C): 36.4 (09-20-22 @ 01:10), Max: 37.2 (09-19-22 @ 17:42)  HR: 54 (09-20-22 @ 01:10) (54 - 63)  BP: 132/70 (09-20-22 @ 01:10) (121/70 - 147/70)  RR: 18 (09-20-22 @ 01:10) (16 - 18)  SpO2: 99% (09-20-22 @ 01:10) (94% - 100%)  Daily     Daily       PHYSICAL EXAM:  Gen: NAD  LS: Respirations unlabored.   Card: RRR.   GI: soft, non tender abdomen  Ext: Warm, well perfused      09-18-22 @ 07:01  -  09-19-22 @ 07:00  --------------------------------------------------------  IN:    IV PiggyBack: 100 mL    Lactated Ringers: 1500 mL  Total IN: 1600 mL    OUT:    Nasogastric/Oral tube (mL): 150 mL    Oral Fluid: 0 mL    Voided (mL): 700 mL  Total OUT: 850 mL    Total NET: 750 mL      09-19-22 @ 07:01  -  09-20-22 @ 01:55  --------------------------------------------------------  IN:    IV PiggyBack: 300 mL    Lactated Ringers: 500 mL    Lactated Ringers: 500 mL    Oral Fluid: 540 mL  Total IN: 1840 mL    OUT:    Nasogastric/Oral tube (mL): 100 mL    Voided (mL): 1150 mL  Total OUT: 1250 mL    Total NET: 590 mL          LAB VALUES:  09-19    136  |  100  |  14  ----------------------------<  85  3.3<L>   |  24  |  1.16    Ca    9.0      19 Sep 2022 05:36  Phos  3.0     09-19  Mg     1.80     09-19    TPro  9.6<H>  /  Alb  4.6  /  TBili  0.5  /  DBili  x   /  AST  32  /  ALT  11  /  AlkPhos  61  09-18                               11.3   5.44  )-----------( 277      ( 19 Sep 2022 05:36 )             35.7     LIVER FUNCTIONS - ( 18 Sep 2022 03:35 )  Alb: 4.6 g/dL / Pro: 9.6 g/dL / ALK PHOS: 61 U/L / ALT: 11 U/L / AST: 32 U/L / GGT: x           PT/INR - ( 18 Sep 2022 03:35 )   PT: 12.2 sec;   INR: 1.05 ratio         PTT - ( 18 Sep 2022 03:35 )  PTT:32.3 sec            MICROBIOLOGY:    No new microbiology data for review.     RADIOLOGY:    No new radiographic images for review.    MEDICATIONS  (STANDING):  heparin   Injectable 5000 Unit(s) SubCutaneous every 8 hours  influenza  Vaccine (HIGH DOSE) 0.7 milliLiter(s) IntraMuscular once  lactated ringers. 1000 milliLiter(s) (50 mL/Hr) IV Continuous <Continuous>  levothyroxine 200 MICROGram(s) Oral daily  metoprolol tartrate Injectable 5 milliGRAM(s) IV Push every 6 hours  pantoprazole    Tablet 40 milliGRAM(s) Oral before breakfast    MEDICATIONS  (PRN):

## 2022-09-20 NOTE — DISCHARGE NOTE NURSING/CASE MANAGEMENT/SOCIAL WORK - NSDCPNINST_GEN_ALL_CORE
Call MD or come to ER for fever/chills, pain not relieved with pain medicines, difficulty in breathing or persistent nausea/vomiting.

## 2022-09-20 NOTE — DISCHARGE NOTE NURSING/CASE MANAGEMENT/SOCIAL WORK - PATIENT PORTAL LINK FT
You can access the FollowMyHealth Patient Portal offered by [facility] by registering at the following website: [email or web address]. By joining [employer]’s FollowMyHealth portal, you will also be able to view your health information using other applications (apps) compatible with our system.

## 2022-09-20 NOTE — DISCHARGE NOTE PROVIDER - CARE PROVIDER_API CALL
Abel Mcdonough)  Surgery  [address] [facility], [address] [place] [address]  Phone: [phone]  Fax: [phone]  Follow Up Time: 2 weeks

## 2022-09-21 ENCOUNTER — NON-APPOINTMENT (OUTPATIENT)
Age: 67
End: 2022-09-21

## 2022-09-23 ENCOUNTER — NON-APPOINTMENT (OUTPATIENT)
Age: 67
End: 2022-09-23

## 2022-09-25 ENCOUNTER — NON-APPOINTMENT (OUTPATIENT)
Age: 67
End: 2022-09-25

## 2022-09-25 PROBLEM — Z13.39 SCREENING FOR ALCOHOL PROBLEM: Status: RESOLVED | Noted: 2022-09-25 | Resolved: 2022-09-27

## 2022-09-25 PROBLEM — Z00.00 MEDICARE ANNUAL WELLNESS VISIT, INITIAL: Status: RESOLVED | Noted: 2022-09-25 | Resolved: 2022-10-05

## 2022-09-25 PROBLEM — F32.A DEPRESSION: Status: ACTIVE | Noted: 2022-09-25

## 2022-09-25 PROBLEM — Z86.79 HISTORY OF INTERMITTENT CLAUDICATION: Status: RESOLVED | Noted: 2018-08-15 | Resolved: 2022-09-25

## 2022-09-25 LAB
BASOPHILS # BLD AUTO: 0.03 K/UL
BASOPHILS NFR BLD AUTO: 0.4 %
CHOLEST SERPL-MCNC: 305 MG/DL
EOSINOPHIL # BLD AUTO: 0.16 K/UL
EOSINOPHIL NFR BLD AUTO: 2.1 %
ESTIMATED AVERAGE GLUCOSE: 117 MG/DL
HBA1C MFR BLD HPLC: 5.7 %
HCT VFR BLD CALC: 40.5 %
HDLC SERPL-MCNC: 98 MG/DL
HGB BLD-MCNC: 12.7 G/DL
IMM GRANULOCYTES NFR BLD AUTO: 0.4 %
LDLC SERPL CALC-MCNC: 177 MG/DL
LYMPHOCYTES # BLD AUTO: 2.15 K/UL
LYMPHOCYTES NFR BLD AUTO: 28.4 %
MAN DIFF?: NORMAL
MCHC RBC-ENTMCNC: 29.3 PG
MCHC RBC-ENTMCNC: 31.4 GM/DL
MCV RBC AUTO: 93.3 FL
MONOCYTES # BLD AUTO: 0.43 K/UL
MONOCYTES NFR BLD AUTO: 5.7 %
NEUTROPHILS # BLD AUTO: 4.77 K/UL
NEUTROPHILS NFR BLD AUTO: 63 %
NONHDLC SERPL-MCNC: 206 MG/DL
PLATELET # BLD AUTO: 338 K/UL
RBC # BLD: 4.34 M/UL
RBC # FLD: 16.8 %
T3FREE SERPL-MCNC: 1.65 PG/ML
T4 FREE SERPL-MCNC: 0.4 NG/DL
TRIGL SERPL-MCNC: 146 MG/DL
TSH SERPL-ACNC: 43.6 UIU/ML
WBC # FLD AUTO: 7.57 K/UL

## 2022-09-25 NOTE — REVIEW OF SYSTEMS
[Cough] : cough [Joint Pain] : joint pain [Muscle Pain] : muscle pain [Negative] : Heme/Lymph [Abdominal Pain] : abdominal pain [FreeTextEntry9] : See HPI

## 2022-09-25 NOTE — COUNSELING
[Weight management counseling provided] : Weight management [Healthy eating counseling provided] : healthy eating [Activity counseling provided] : activity [Low Salt Diet] : Low salt diet [Behavioral health counseling provided] : Behavioral health counseling provided [Benefits of weight loss discussed] : Benefits of weight loss discussed [Structured Weight Management Program suggested:] : Structured weight management program suggested

## 2022-09-25 NOTE — HISTORY OF PRESENT ILLNESS
[de-identified] : Pam is a 67-year-old black female with a history of Graves' disease status post ablation now hypothyroid, morbid obesity, hypertension, renal cell carcinoma with mild renal insufficiency post nephrectomy and recurrent SBO here for a comprehensive evaluation.\par \par In terms of her RCC she is seeing her urologist once a year.  No c/o hematuria. \par \par Retired but is bored.\par \par She was hospitalized in March with another SBO while in [place].  Surgery was advised but she didn't want it.  She continues to have intermittent abdominal pain and c/o reflux which is severe at times.\par \par She is following with a holistic doctor who switched her to NP Thyroid.\par \par C/O depressed mood and would like to see someone

## 2022-09-25 NOTE — HEALTH RISK ASSESSMENT
[Yes] : Yes [No] : In the past 12 months have you used drugs other than those required for medical reasons? No [No falls in past year] : Patient reported no falls in the past year [Patient reported mammogram was normal] : Patient reported mammogram was normal [Patient reported PAP Smear was normal] : Patient reported PAP Smear was normal [Patient reported bone density results were normal] : Patient reported bone density results were normal [Patient reported colonoscopy was abnormal] : Patient reported colonoscopy was abnormal [None] : None [Alone] : lives alone [Employed] : employed [Feels Safe at Home] : Feels safe at home [Fully functional (bathing, dressing, toileting, transferring, walking, feeding)] : Fully functional (bathing, dressing, toileting, transferring, walking, feeding) [Fully functional (using the telephone, shopping, preparing meals, housekeeping, doing laundry, using] : Fully functional and needs no help or supervision to perform IADLs (using the telephone, shopping, preparing meals, housekeeping, doing laundry, using transportation, managing medications and managing finances) [Smoke Detector] : smoke detector [Carbon Monoxide Detector] : carbon monoxide detector [Seat Belt] :  uses seat belt [Sunscreen] : uses sunscreen [Fair] :  ~his/her~ mood as fair [Never] : Never [Several Days (1)] : 2.) Feeling down, depressed or hopeless? Several days [1/2 of Days or More (2)] : 6.) Feeling bad about yourself, or that you are a failure, or have let yourself or your family down? Half the days or more [Nearly Every Day (3)] : 7.) Trouble concentrating on things, such as reading a newspaper or watching television? Nearly every day [Not at All (0)] : 8.) Moving or speaking so slowly that other people could have noticed, or the opposite, moving or speaking faster than usual? Not at all [Mild] : severity of depression is mild [Somewhat Difficult] : How difficult have these problems made it for you to do your work, take care of things at home, or get along with people? Somewhat difficult [PHQ-9 Positive] : PHQ-9 Positive [With Patient/Caregiver] : , with patient/caregiver [de-identified] : Occasional [de-identified] : Exercises reg [[id number]] : 9 [Change in mental status noted] : No change in mental status noted [Language] : denies difficulty with language [Behavior] : denies difficulty with behavior [Learning/Retaining New Information] : denies difficulty learning/retaining new information [Handling Complex Tasks] : denies difficulty handling complex tasks [Reasoning] : denies difficulty with reasoning [Spatial Ability and Orientation] : denies difficulty with spatial ability and orientation [Reports changes in hearing] : Reports no changes in hearing [Reports changes in vision] : Reports no changes in vision [Reports changes in dental health] : Reports no changes in dental health [MammogramDate] : 03/19 [PapSmearDate] : 03/19 [BoneDensityDate] : 03/19 [ColonoscopyDate] : 01/13 [ColonoscopyComments] : Had polyps but refuses to go for any more [AdvancecareDate] : 09/22

## 2022-09-25 NOTE — ASSESSMENT
[FreeTextEntry1] : \par 1.  REBECCA - is UTD but should be going for a colonoscopy again as she had some polyps a number of years ago.  She declines at this time.  She refuses any immunizations at this time, including Prevnar which she is due for.\par 2.  HTN - BP is slightly elevated but was normal when she saw renal.\par 3.  Hypothyroidism - c/o fatigue but is now on medication from a holistic doctor.  Will check TFT's\par 4.  Recurrent episode of SBO likely due to adhesions.  Declined surgery.  Also with severe reflux at times.  Will sdtart protonix 40 mg daily and refer to GI.\par 5.  Increased weight - to work on diet and exercise\par 6.  Depression - PHQ = 9.  Referred to Behavioral health.  Has declined cymbalta in the past\par 7.  H/O RCC -follows with urology and renal as she has a mildly elevated creatinine.\par 8.  Prediabetes - no overt signs or symptoms of DM.  Will check a HgbA1C.\par 9.  Abnormal EKG with bifascicular block without change.  \par 10. Follow-up pending above.

## 2022-09-25 NOTE — PHYSICAL EXAM

## 2022-10-07 ENCOUNTER — APPOINTMENT (OUTPATIENT)
Dept: INTERNAL MEDICINE | Facility: CLINIC | Age: 67
End: 2022-10-07

## 2022-10-07 VITALS
HEIGHT: 67 IN | HEART RATE: 73 BPM | WEIGHT: 253 LBS | SYSTOLIC BLOOD PRESSURE: 138 MMHG | BODY MASS INDEX: 39.71 KG/M2 | DIASTOLIC BLOOD PRESSURE: 86 MMHG | OXYGEN SATURATION: 92 %

## 2022-10-07 PROCEDURE — 99214 OFFICE O/P EST MOD 30 MIN: CPT

## 2022-10-08 NOTE — ASSESSMENT
[FreeTextEntry1] : 1.  Recurrent small bowel obstructions likely due to adhesions.  Patient has not wanted to pursue surgery to see if she needs lysis of adhesions.  We will continue to monitor closely for recurrent symptoms.  Advised not to do any further sit ups or anything that would raise her intra-abdominal pressure.\par 2.  Hypertension controlled at this time.  Would like to see her numbers a little lower but will follow for now.\par 3.  Morbid obesity -understands the benefits of weight loss but she has been unable to do so.\par 4.  CKD 3, stable.  Saw nephrology earlier this year.  Labs reviewed and appreciated.  Nephrology note read and appreciated.\par 5.  Follow-up in 3 to 4 months.  Declines a flu shot.

## 2022-10-08 NOTE — HISTORY OF PRESENT ILLNESS
[de-identified] : Akila is a 67-year-old female with a history of hypertension, hypothyroidism, renal cell CA, CKD 3 here for follow-up after being hospitalized for another SBO.  She has been admitted 15 times in the past several years for an SBO thought to be due to adhesions.  Surgery has been entertained in the past but not during this recent admission because her symptoms resolved so quickly.  She has trouble tolerating the NG tube after it is removed because she has a sensation that her esophagus is closing.  She is eating fine now and has no abdominal pain.  She thinks she may actually have precipitated episode as she was doing sit ups and acutely felt the pain.  No complaints of fever, bowel movements are now normal.  She is eating well

## 2022-10-13 PROBLEM — Z13.31 SCREENING FOR DEPRESSION: Status: RESOLVED | Noted: 2022-09-25 | Resolved: 2022-09-27

## 2022-12-01 NOTE — ED ADULT TRIAGE NOTE - NS ED NURSE BANDS TYPE
[de-identified] : 61 year old male presents for initial evaluation of bilateral hip pain x 6 months. Denies any particular trauma or injury. He complains of constant sharp pain, L>R, worse with activity, particularly yoga. He also reports some stiffness with his activities. He also notes pain is worse with lying on either hip. He has tried Advil for pain with mild relief. Denies low back pain. Denies paresthesias in the lower extremities. Denies prior injury. 
Name band;

## 2022-12-09 NOTE — ED PROCEDURE NOTE - CPROC ED INDICATIONS1
intestinal obstruction/sbo Show Aperture Variable?: Yes Consent: The patient's consent was obtained including but not limited to risks of crusting, scabbing, blistering, scarring, darker or lighter pigmentary change, recurrence, incomplete removal and infection. Render Note In Bullet Format When Appropriate: No Duration Of Freeze Thaw-Cycle (Seconds): 1 Post-Care Instructions: I reviewed with the patient in detail post-care instructions. Patient is to wear sunprotection, and avoid picking at any of the treated lesions. Pt may apply Vaseline to crusted or scabbing areas. Number Of Freeze-Thaw Cycles: 2 freeze-thaw cycles Detail Level: Detailed

## 2022-12-12 NOTE — ED ADULT NURSE NOTE - TEMPLATE LIST FOR HEAD TO TOE ASSESSMENT
DIAGNOSIS: Left Hip pain   APPOINTMENT DATE: 12/21/2022   NOTES STATUS DETAILS   OFFICE NOTE from referring provider Internal 12/02/2022 Dr Rea [facility]    OFFICE NOTE from other specialist N/A    DISCHARGE SUMMARY from hospital N/A    DISCHARGE REPORT from the ER Care Everywhere 11/29/2022 [facility]   OPERATIVE REPORT N/A    EMG report N/A    MEDICATION LIST N/A    MRI N/A    DEXA (osteoporosis/bone health) N/A    CT SCAN N/A    XRAYS (IMAGES & REPORTS) Internal 12/02/2022 pelvis hip           
Abdominal Pain, N/V/D

## 2023-01-20 NOTE — PATIENT PROFILE ADULT. - AS SC BRADEN FRICTION
[facility] THERAPY MISSED TREATMENT NOTE  [facility] CCU 3A  3A-10/010-A      Date: 2023  Patient Name: Amaury Branch        CSN: [id number]   : 1963  (60 y.o.)  Gender: male                REASON FOR MISSED TREATMENT:  Pt off the floor at dialysis upon attempt, will check back as able. (3) no apparent problem

## 2023-02-07 ENCOUNTER — APPOINTMENT (OUTPATIENT)
Dept: INTERNAL MEDICINE | Facility: CLINIC | Age: 68
End: 2023-02-07
Payer: MEDICARE

## 2023-02-07 ENCOUNTER — LABORATORY RESULT (OUTPATIENT)
Age: 68
End: 2023-02-07

## 2023-02-07 VITALS
BODY MASS INDEX: 38.61 KG/M2 | OXYGEN SATURATION: 97 % | HEIGHT: 67 IN | DIASTOLIC BLOOD PRESSURE: 86 MMHG | HEART RATE: 80 BPM | WEIGHT: 246 LBS | SYSTOLIC BLOOD PRESSURE: 138 MMHG

## 2023-02-07 DIAGNOSIS — E66.01 MORBID (SEVERE) OBESITY DUE TO EXCESS CALORIES: ICD-10-CM

## 2023-02-07 DIAGNOSIS — R73.09 OTHER ABNORMAL GLUCOSE: ICD-10-CM

## 2023-02-07 DIAGNOSIS — Z87.19 PERSONAL HISTORY OF OTHER DISEASES OF THE DIGESTIVE SYSTEM: ICD-10-CM

## 2023-02-07 DIAGNOSIS — R41.3 OTHER AMNESIA: ICD-10-CM

## 2023-02-07 PROCEDURE — 99214 OFFICE O/P EST MOD 30 MIN: CPT

## 2023-02-07 NOTE — REVIEW OF SYSTEMS
[Negative] : Constitutional [FreeTextEntry7] : Having BMs twice per day which is her normal baseline.  Had been once a day but her NP thyroid was recently increased. [FreeTextEntry8] : See HPI

## 2023-02-07 NOTE — HISTORY OF PRESENT ILLNESS
[de-identified] : 68-year-old female with a history of hypothyroidism, morbid obesity, hypertension, recurrent small bowel obstructions here for follow-up of the same.  Has been seeing a  regarding her thyroid and is on NP thyroid.  The last time I checked her thyroid function tests her TSH was markedly elevated and her free T3 was low.\par \par She continues to struggle with her weight although it is down 12 pounds from about a year ago.\par \par She is going to the gym and is feeling well.  Has noticed some difficulty with her memory.  Her daughter and friends have noticed well.  She can be looking for something and it is in her hand.  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  She is enjoying this though.\par \par She did have an episode of some right-sided abdominal pain reminiscent of her kidney pain associated with frothy urine but over the last couple weeks it resolved and her urine is clear.  No gross hematuria.

## 2023-02-07 NOTE — ASSESSMENT
[FreeTextEntry1] : 1.  HTN - BP is acceptable.  Continue current management.  Check electrolytes and creatinine.\par 2.  Hypothyroidism following with a naturopath.  Last T3 was low and they raised her dose of NP thyroid and she has more energy, BMs are back to normal and she has lost about 7 pounds.  Dose adjustment was done approximately 1 month ago.\par 3.  Complaints of memory difficulties -stressed importance of staying busy, doing word and math games, will check reversible causes.  Briefly discussed getting a neurology evaluation but she does not feel it is necessary at this time.  We will continue to follow clinically.\par 4.  Complaints of frothy urine and flank pain now resolved.  Does not think she passed a stone.  We will check a UA with microscopy as well as her creatinine given her history of renal cell carcinoma.\par 5.  Morbid obesity with good weight loss over the past year.  Encouraged to continue with the gym and eating healthy and losing weight.\par 6.  Prediabetes -check a hemoglobin A1c today.\par 7.  Follow-up pending above.

## 2023-02-08 LAB
ALBUMIN SERPL ELPH-MCNC: 4.4 G/DL
ALP BLD-CCNC: 72 U/L
ALT SERPL-CCNC: 13 U/L
ANION GAP SERPL CALC-SCNC: 13 MMOL/L
APPEARANCE: CLEAR
AST SERPL-CCNC: 18 U/L
BACTERIA: NEGATIVE
BASOPHILS # BLD AUTO: 0.02 K/UL
BASOPHILS NFR BLD AUTO: 0.3 %
BILIRUB SERPL-MCNC: 0.2 MG/DL
BILIRUBIN URINE: NEGATIVE
BLOOD URINE: NEGATIVE
BUN SERPL-MCNC: 22 MG/DL
CALCIUM SERPL-MCNC: 10.4 MG/DL
CHLORIDE SERPL-SCNC: 104 MMOL/L
CHOLEST SERPL-MCNC: 178 MG/DL
CO2 SERPL-SCNC: 23 MMOL/L
COLOR: YELLOW
CREAT SERPL-MCNC: 1.3 MG/DL
EGFR: 45 ML/MIN/1.73M2
EOSINOPHIL # BLD AUTO: 0.06 K/UL
EOSINOPHIL NFR BLD AUTO: 0.8 %
ESTIMATED AVERAGE GLUCOSE: 117 MG/DL
FOLATE SERPL-MCNC: 7.5 NG/ML
GLUCOSE QUALITATIVE U: NEGATIVE
GLUCOSE SERPL-MCNC: 86 MG/DL
HBA1C MFR BLD HPLC: 5.7 %
HCT VFR BLD CALC: 40.3 %
HDLC SERPL-MCNC: 75 MG/DL
HGB BLD-MCNC: 12.4 G/DL
HYALINE CASTS: 0 /LPF
IMM GRANULOCYTES NFR BLD AUTO: 0.3 %
KETONES URINE: NEGATIVE
LDLC SERPL CALC-MCNC: 89 MG/DL
LEUKOCYTE ESTERASE URINE: ABNORMAL
LYMPHOCYTES # BLD AUTO: 1.9 K/UL
LYMPHOCYTES NFR BLD AUTO: 26.8 %
MAGNESIUM SERPL-MCNC: 2 MG/DL
MAN DIFF?: NORMAL
MCHC RBC-ENTMCNC: 29.3 PG
MCHC RBC-ENTMCNC: 30.8 GM/DL
MCV RBC AUTO: 95.3 FL
MICROSCOPIC-UA: NORMAL
MONOCYTES # BLD AUTO: 0.55 K/UL
MONOCYTES NFR BLD AUTO: 7.8 %
NEUTROPHILS # BLD AUTO: 4.54 K/UL
NEUTROPHILS NFR BLD AUTO: 64 %
NITRITE URINE: NEGATIVE
NONHDLC SERPL-MCNC: 104 MG/DL
PH URINE: 6
PLATELET # BLD AUTO: 325 K/UL
POTASSIUM SERPL-SCNC: 4.9 MMOL/L
PROT SERPL-MCNC: 8.2 G/DL
PROTEIN URINE: ABNORMAL
RBC # BLD: 4.23 M/UL
RBC # FLD: 14.6 %
RED BLOOD CELLS URINE: 1 /HPF
SODIUM SERPL-SCNC: 141 MMOL/L
SPECIFIC GRAVITY URINE: 1.02
SQUAMOUS EPITHELIAL CELLS: 3 /HPF
T PALLIDUM AB SER QL IA: NEGATIVE
T4 FREE SERPL-MCNC: 2 NG/DL
TRIGL SERPL-MCNC: 75 MG/DL
TSH SERPL-ACNC: 0.04 UIU/ML
UROBILINOGEN URINE: NORMAL
VIT B12 SERPL-MCNC: 906 PG/ML
WBC # FLD AUTO: 7.09 K/UL
WHITE BLOOD CELLS URINE: 2 /HPF

## 2023-02-09 LAB — BACTERIA UR CULT: NORMAL

## 2023-02-10 NOTE — ED PROVIDER NOTE - ATTENDING CONTRIBUTION TO CARE
18
12
bautista: hx recurrent SBOs believed related to prior surgeries. Pt now presents with abdominal distention and pain.  exam: distended and tender diffuse mid and lower abdomen.   CT: SBO similar to site of recent SBO several months prior.  surelaverne consulted; NG placed. pt to be admitted to Dr Waddell.

## 2023-05-10 NOTE — ED ADULT NURSE NOTE - NSFALLRSKASSESSTYPE_ED_ALL_ED
Initial (On Arrival) Retention Suture Text: Retention sutures were placed to support the closure and prevent dehiscence.

## 2023-06-13 NOTE — ED PROVIDER NOTE - LIVES WITH, PROFILE
"PSYCHIATRY  PROGRESS NOTE     DATE OF SERVICE   6/13/2023         CHIEF COMPLAINT   \"I feel anxious.\"       SUBJECTIVE   Nursing reports:     Nursing reportsPatient remains on SIO due to risk for self-injury and suicide. She is using a medical bed to help with mobility due to a history of osteoporosis.      Patient vomited a small amount of whitish vomitus without any food particles. Verbalized that she still feel nauseated and Zofran 4 mg. given @ 2351 PRN for nausea.  She has been restless since the start of the shift and could hardly sleep. Sleeping medication was offered to her and she agreed to take it. Melatonin 3 mg. given @ 2357 PRN for sleep. At about 0230, patient was seen sticking two of her fingers down her throat and caused her to gag and threw up a small amount of whitish secretions, mostly saliva. Patient continued to be restless and kept on turning and tossing in bed the next three hours.  Hydroxyzine 25 mg. given @0342 PRN for anxiety. Patient was noted to be confused and disorganized. She was disoriented to place as she asked the staff for the location of the bathroom. Patient went to the bathroom, and sat on the toilet several times during the night, but she voided twice only.      Total Intake: 120 ml water. Total Output: 150 ml, clear, yellow/ straw colored urine. There was some unmeasured urine as patient took out the hat from the toilet seat.     Slept for a total of 3.5 hours.       She is scheduled for a BMP and CBC with Platelets and Differential count check today.         reports:    Assessment/Intervention/Current Symptoms and Care Coordination  - chart review  - team meeting  - team rounds/pt interview addressed patient needs/concerns  - Current Symptoms include the following: suicidal  - Confirmed with Josefina from [facility] that pt is able to return home when stable as she has lived with them for 5 years.      Discharge Plan or Goal  Pending stabilization & development " "of a safe discharge plan.  Considerations include:  [facility]        Barriers to Discharge   Patient requires further psychiatric stabilization due to current symptomology        Referral Status  None        Legal Status  Patient is under MI commitment in [facility]           OBJECTIVE   Met with pt in dining room area on unit 3B. Upon patient interview, patient reports, \"I feel anxious.\" Pt affect appears flat and pt mood is reported as \"fair.\"  Patient does giggle at times during conversation in a manner which is incongruent with speech. Thoughts continue to be disorganized and difficult to follow. Patient reports she feels anxiety due being encouraged to attend groups this morning by staff on unit. Provider discussed that all patients are encouraged to attend the groups as they are beneficial for patients to gain coping skills and engage with peers on unit.  Patient does not verbalize understanding and appears apprehensive. Patient appears to have ongoing confusion and does not recognize writer although patient has spoke with this provider daily during current psychiatric hospitalization.  When asked, patient does endorse experiencing some issues with memory.  Patient states, \"I can remember things from long ago.  I struggle with remembering what happens daily.\"  Patient is oriented to time, is aware of being in a hospital, and able to explain that she was brought in for being suicidal. Today patient denies SI, intent, or plan.  Denies HI.  Patient continues on SIO monitoring due to reporting active SI with plan and ongoing confusion/impulsive unsafe behaviors observed by staff. Patient denies any auditory or visual hallucinations. Pt denies nausea and vomiting today however has an emesis basin next to her due to experiencing nausea and vomiting most of day yesterday. This was likely due to patient having a gastric bypass; patient endorses that she often experiences nausea, vomiting, constipation, " and diarrhea since this procedure.  Patient was COVID tested yesterday to rule out and results negative.     Patient had a BMP and CBC today. Patient continues to experience hyponatremia however improving; sodium level today is 133 and increased from yesterday (129).  Patient continues to be on a 2 L fluid restriction per IM to treat hyponatremia. Plan will be to increase patient's dose of Prozac to 10 mg liquid daily to target ongoing depression symptoms. Namenda 2.5 mg PO BID was initiated yesterday to treat observed dementia symptoms.  Today patient denies any side effects of this medication. Plan will be to continue Olanzapine ODT 2.5 mg tablet every morning, Olanzapine 10 mg ODT tablet at bedtime, and Mirtazapine 15 mg PO disintegrating tablet at HS.        MEDICATIONS   Medications:  Scheduled Meds:    calcium carbonate  500 mg Oral Daily     childrens multivitamin with iron  2 tablet Oral Daily     cyanocobalamin  100 mcg Oral Daily     famotidine  20 mg Oral Daily     ferrous sulfate  325 mg Oral Every Other Day     [START ON 6/14/2023] FLUoxetine  10 mg Oral Daily     memantine  2.5 mg Oral BID     mirtazapine  15 mg Orally disintegrating tablet At Bedtime     OLANZapine zydis  2.5 mg Oral QAM     OLANZapine zydis  10 mg Oral At Bedtime     simvastatin  40 mg Oral At Bedtime     valACYclovir  500 mg Oral Daily     cholecalciferol  25 mcg Oral Daily     Continuous Infusions:  PRN Meds:.acetaminophen, alum & mag hydroxide-simethicone, budesonide-formoterol, calcium carbonate, hydrOXYzine, melatonin, OLANZapine zydis, ondansetron, prochlorperazine, senna-docusate    Medication adherence issues: MS Med Adherence Y/N: No  Medication side effects: MEDICATION SIDE EFFECTS: no side effects reported however patient has ongoing hyponatremia which is trending up.   Benefit: Yes / No: Yes       ROS   Pertinent items are noted in HPI.       MENTAL STATUS EXAM   Vitals: /75 (BP Location: Right arm, Patient  "Position: Supine, Cuff Size: Adult Small)   Pulse 103   Temp 99.8  F (37.7  C) (Temporal)   Resp 16   Ht 1.626 m (5' 4\")   Wt 48.9 kg (107 lb 12.9 oz)   LMP  (LMP Unknown)   SpO2 95%   BMI 18.50 kg/m      Appearance:  Poorly groomed and Disheveled  Mood: \"Fair.\"  Affect: flat  was congruent to speech.  Suicidal Ideation: absent  Homicidal Ideation: PRESENT / ABSENT: absent   Thought process: difficult to follow and disorganized   Thought content: endorses preoccupations  Fund of Knowledge: average  Attention/Concentration: Poor  Language ability:  Intact  Memory:  Immediate recall impaired, Short-term memory impaired and Long-term memory impaired  Insight:  limited.  Judgement: limited  Orientation: Person:  yes  Place:  yes aware she is in a hospital  Time: Yes  Situation: yes  Psychomotor Behavior: normal or unremarkable    Muscle Strength and Tone: MuscleStrength: Normal  Gait and Station: slightly stiff however steady with walker       LABS   Personally reviewed.      Latest Reference Range & Units 06/10/23 13:09 06/10/23 14:42 06/11/23 14:11 06/11/23 14:21 06/12/23 08:00 06/12/23 12:07 06/13/23 07:46   Sodium 136 - 145 mmol/L 129 (L)    129 (L)  133 (L)   Potassium 3.4 - 5.3 mmol/L 3.9    4.0  3.8   Chloride 98 - 107 mmol/L 95 (L)    93 (L)  96 (L)   Carbon Dioxide (CO2) 22 - 29 mmol/L 27    24  26   Urea Nitrogen 8.0 - 23.0 mg/dL 14.3    26.3 (H)  16.0   Creatinine 0.51 - 0.95 mg/dL 0.75    0.95  0.73   GFR Estimate >60 mL/min/1.73m2 80    60 (L)  82   Calcium 8.8 - 10.2 mg/dL 9.8    10.0  9.7   Anion Gap 7 - 15 mmol/L 7    12  11   Albumin 3.5 - 5.2 g/dL 3.9         Protein Total 6.4 - 8.3 g/dL 6.2 (L)         Alkaline Phosphatase 35 - 104 U/L 49         ALT 10 - 35 U/L 13         AST 10 - 35 U/L 19         Bilirubin Total <=1.2 mg/dL 0.3         Glucose 70 - 99 mg/dL 99    201 (H)  102 (H)   Lipase 13 - 60 U/L    21      WBC 4.0 - 11.0 10e3/uL 11.5 (H)      10.4   Hemoglobin 11.7 - 15.7 g/dL 12.9     "  12.6   Hematocrit 35.0 - 47.0 % 37.8      37.7   Platelet Count 150 - 450 10e3/uL 249      262   RBC Count 3.80 - 5.20 10e6/uL 4.15      4.11   MCV 78 - 100 fL 91      92   MCH 26.5 - 33.0 pg 31.1      30.7   MCHC 31.5 - 36.5 g/dL 34.1      33.4   RDW 10.0 - 15.0 % 12.9      13.0   % Neutrophils % 75      79   % Lymphocytes % 17      13   % Monocytes % 8      8   % Eosinophils % 0      0   % Basophils % 0      0   Absolute Basophils 0.0 - 0.2 10e3/uL 0.0      0.0   Absolute Eosinophils 0.0 - 0.7 10e3/uL 0.0      0.0   Absolute Immature Granulocytes <=0.4 10e3/uL 0.0      0.0   Absolute Lymphocytes 0.8 - 5.3 10e3/uL 2.0      1.4   Absolute Monocytes 0.0 - 1.3 10e3/uL 1.0      0.8   % Immature Granulocytes % 0      0   Absolute Neutrophils 1.6 - 8.3 10e3/uL 8.5 (H)      8.2   Absolute NRBCs 10e3/uL 0.0      0.0   NRBCs per 100 WBC <1 /100 0      0   Color Urine Colorless, Straw, Light Yellow, Yellow   Light Yellow        Appearance Urine Clear   Clear        Glucose Urine Negative mg/dL  Negative        Bilirubin Urine Negative   Negative        Ketones Urine Negative mg/dL  Negative        Specific Gravity Urine 1.003 - 1.035   1.009        pH Urine 5.0 - 7.0   6.5        Protein Albumin Urine Negative mg/dL  Negative        Urobilinogen mg/dL Normal, 2.0 mg/dL  Normal        Nitrite Urine Negative   Negative        Blood Urine Negative   Negative        Leukocyte Esterase Urine Negative   Negative        SARS CoV2 PCR Negative       Negative    XR ABDOMEN 1 VIEW    Rpt       (L): Data is abnormally low  (H): Data is abnormally high  Rpt: View report in Results Review for more information        Lab Results   Component Value Date    VALPROATE 59.5 06/05/2023          DIAGNOSIS   Principal Problem:    Suicidal ideation  MDD, severe, recurrent episode, with psychotic features  R/O Bipolar Disorder Type 1    Active Problem List:  Patient Active Problem List   Diagnosis     Arthritis     Depressive disorder      Borderline personality disorder (H)     GERD (gastroesophageal reflux disease)     Holosystolic murmur     Asthma     History of gastric bypass     Hyperlipidemia LDL goal <130     Other insomnia     Mild mitral regurgitation     Mild aortic stenosis     Weight loss     Bilateral low back pain without sciatica     Adhesive capsulitis of shoulder, right     Mild intermittent asthma, unspecified whether complicated     Bipolar affective disorder, remission status unspecified (H)     Constipation, unspecified constipation type     Age-related osteoporosis without current pathological fracture     Plantar warts     Hypoglycemia     Failure to thrive in adult     Hypotension, unspecified hypotension type     Suicidal ideation          PLAN   1. Education given regarding diagnostic and treatment options with risks, benefits and alternatives and adequate verbalization of understanding.  2.  Medications:  Hospital  -Increase Prozac to 10 mg PO liquid daily to target depressed mood.  -Continue Namenda 2.5 mg tablet PO BID to treat dementia symptoms observed.  -Continue Olanzapine ODT 2.5 mg tablet every morning  -Continue Olanzapine 10 mg ODT tablet at bedtime  -Continue Mirtazapine 15 mg PO disintegrating tablet at HS.   -Continue Olanzapine ODT 5 mg PO tablet 3 times daily as needed for severe agitation.   3. Consultations:  Hospitalist to follow as needed.  Dietician consulted and will follow pt; plan is for pt to eat small meals/snack throughout the day d/t history of gastric bypass.    4. Structure and Supervision  Unit 3B  Precautions placed.  Fall precautions ordered.  Ordered SIO monitoring due to reporting active SI with plan and confused impulsive behavior.  5.   is following in regards to collecting and reviewing collateral information, referrals and disposition planning.  Legal: civil commitment.   Referrals:  Per CTC  Care Coordination:  Per CTC  Placement:  TBD  Anticipated  Discharge:  TBD     Further treatment programming to be determined throughout the hospital course.      Risk Assessment: [facility] RISK ASSESSMENT: Patient on precautions    Coordination of Care:   Treatment Plan reviewed and physician signed, Care discussed with Care/Treatment Team Members, Chart reviewed and Patient seen      Re-Certification I certify that the inpatient psychiatric facility services furnished since the previous certification were, and continue to be, medically necessary for, either, treatment which could reasonably be expected to improve the patient s condition or diagnostic study and that the hospital records indicate that the services furnished were, either, intensive treatment services, admission and related services necessary for diagnostic study, or equivalent services.     I certify that the patient continues to need, on a daily basis, active treatment furnished directly by or requiring the supervision of inpatient psychiatric facility personnel.   I estimate 7-14 days of hospitalization is necessary for proper treatment of the patient. My plans for post-hospital care for this patient are  TBD     DANIEL Bennett CNP    -     6/13/2023     -     10:00 AM    Total time  35 minutes with > 50%spent on coordination of cares and psycho-education.    This note was created with help of Dragon dictation system. Grammatical / typing errors are not intentional.    DANIEL Bennett CNP          alone

## 2023-06-19 NOTE — PATIENT PROFILE ADULT. - NS PRO MODE OF ARRIVAL
stretcher 36 y/o female patient with morbid obesity (s/p sleeve gastrectomy) and hiatal hernia; comes in for conversion to modified duodenal switch and admission to surgery [facility] for perioperative care.

## 2023-06-29 ENCOUNTER — OFFICE (OUTPATIENT)
Dept: URBAN - METROPOLITAN AREA CLINIC 109 | Facility: CLINIC | Age: 68
Setting detail: OPHTHALMOLOGY
End: 2023-06-29
Payer: MEDICARE

## 2023-06-29 DIAGNOSIS — H04.122: ICD-10-CM

## 2023-06-29 DIAGNOSIS — H04.121: ICD-10-CM

## 2023-06-29 DIAGNOSIS — H35.362: ICD-10-CM

## 2023-06-29 DIAGNOSIS — E05.00: ICD-10-CM

## 2023-06-29 DIAGNOSIS — H25.13: ICD-10-CM

## 2023-06-29 PROCEDURE — 92134 CPTRZ OPH DX IMG PST SGM RTA: CPT | Performed by: OPHTHALMOLOGY

## 2023-06-29 PROCEDURE — 92014 COMPRE OPH EXAM EST PT 1/>: CPT | Performed by: OPHTHALMOLOGY

## 2023-06-29 ASSESSMENT — REFRACTION_CURRENTRX
OS_AXIS: 169
OD_SPHERE: +1.50
OD_ADD: +2.50
OS_CYLINDER: -0.25
OS_OVR_VA: 20/
OS_SPHERE: +1.50
OS_ADD: +2.50
OD_AXIS: 31
OD_OVR_VA: 20/
OS_OVR_VA: 20/
OS_SPHERE: +3.25
OD_SPHERE: +3.25
OD_CYLINDER: -0.50
OD_OVR_VA: 20/

## 2023-06-29 ASSESSMENT — SPHEQUIV_DERIVED
OS_SPHEQUIV: 2.625
OD_SPHEQUIV: 2.125

## 2023-06-29 ASSESSMENT — LID EXAM ASSESSMENTS
OD_MEIBOMITIS: RLL RUL 1+
OS_MEIBOMITIS: LLL LUL 1+

## 2023-06-29 ASSESSMENT — VISUAL ACUITY
OD_BCVA: 20/20-2
OS_BCVA: 20/20-2

## 2023-06-29 ASSESSMENT — CONFRONTATIONAL VISUAL FIELD TEST (CVF)
OS_FINDINGS: FULL
OD_FINDINGS: FULL

## 2023-06-29 ASSESSMENT — REFRACTION_AUTOREFRACTION
OD_CYLINDER: -0.75
OD_SPHERE: +2.50
OS_CYLINDER: -0.75
OS_AXIS: 64
OD_AXIS: 76
OS_SPHERE: +3.00

## 2023-06-29 ASSESSMENT — TEAR BREAK UP TIME (TBUT)
OS_TBUT: 1+
OD_TBUT: 1+

## 2023-06-29 ASSESSMENT — TONOMETRY
OS_IOP_MMHG: 15
OD_IOP_MMHG: 15

## 2023-06-29 ASSESSMENT — REFRACTION_MANIFEST
OS_SPHERE: +1.50
OD_SPHERE: +1.75
OS_ADD: +2.50
OD_ADD: +2.50
OS_VA1: 20/25
OD_SPHERE: +3.75
OD_VA1: 20/20
OS_SPHERE: +3.75

## 2023-08-30 ENCOUNTER — OUTPATIENT (OUTPATIENT)
Dept: OUTPATIENT SERVICES | Facility: HOSPITAL | Age: 68
LOS: 1 days | End: 2023-08-30

## 2023-08-30 ENCOUNTER — APPOINTMENT (OUTPATIENT)
Age: 68
End: 2023-08-30

## 2023-08-30 DIAGNOSIS — Z87.898 PERSONAL HISTORY OF OTHER SPECIFIED CONDITIONS: ICD-10-CM

## 2023-08-30 DIAGNOSIS — U07.1 COVID-19: ICD-10-CM

## 2023-08-30 DIAGNOSIS — Z87.09 PERSONAL HISTORY OF OTHER DISEASES OF THE RESPIRATORY SYSTEM: ICD-10-CM

## 2023-08-30 DIAGNOSIS — Z85.528 PERSONAL HISTORY OF OTHER MALIGNANT NEOPLASM OF KIDNEY: ICD-10-CM

## 2023-08-30 DIAGNOSIS — M17.11 UNILATERAL PRIMARY OSTEOARTHRITIS, RIGHT KNEE: ICD-10-CM

## 2023-08-30 DIAGNOSIS — R45.89 OTHER SYMPTOMS AND SIGNS INVOLVING EMOTIONAL STATE: ICD-10-CM

## 2023-08-31 DIAGNOSIS — I10 ESSENTIAL (PRIMARY) HYPERTENSION: ICD-10-CM

## 2023-09-07 NOTE — DIETITIAN INITIAL EVALUATION ADULT. - FEEDING SKILL
Anesthesia Post-op Note    Patient: Shimon Hernandez  Procedure(s) Performed: COLONOSCOPY  Anesthesia type: MAC    Vitals Value Taken Time   Temp 36.3 °C (97.3 °F) 09/07/23 1445   Pulse 72 09/07/23 1445   Resp 20 09/07/23 1445   SpO2 99 % 09/07/23 1445   /78 09/07/23 1445         Patient Location: Phase II  Post-op Vital Signs:stable  Level of Consciousness: participates in exam, alert, awake and oriented  Respiratory Status: spontaneous ventilation and room air  Cardiovascular blood pressure returned to baseline and stable  Hydration: euvolemic  Pain Management: well controlled  Handoff: Handoff to receiving clinician was performed and questions were answered  Vomiting: none  Nausea: None  Airway Patency:patent  Post-op Assessment: awake, alert, appropriately conversant, or baseline, no complications, patient tolerated procedure well and no evidence of recall      No notable events documented.   independent

## 2023-09-10 ENCOUNTER — INPATIENT (INPATIENT)
Facility: HOSPITAL | Age: 68
LOS: 1 days | Discharge: HOME | End: 2023-09-12
Attending: SURGERY | Admitting: SURGERY
Payer: MEDICARE

## 2023-09-10 VITALS
HEART RATE: 77 BPM | TEMPERATURE: 99 F | SYSTOLIC BLOOD PRESSURE: 137 MMHG | OXYGEN SATURATION: 100 % | RESPIRATION RATE: 18 BRPM | DIASTOLIC BLOOD PRESSURE: 72 MMHG

## 2023-09-10 DIAGNOSIS — K56.609 UNSPECIFIED INTESTINAL OBSTRUCTION, UNSPECIFIED AS TO PARTIAL VERSUS COMPLETE OBSTRUCTION: ICD-10-CM

## 2023-09-10 LAB
ALBUMIN SERPL ELPH-MCNC: 4.1 G/DL — SIGNIFICANT CHANGE UP (ref 3.3–5)
ALP SERPL-CCNC: 62 U/L — SIGNIFICANT CHANGE UP (ref 40–120)
ALT FLD-CCNC: 21 U/L — SIGNIFICANT CHANGE UP (ref 4–33)
ANION GAP SERPL CALC-SCNC: 10 MMOL/L — SIGNIFICANT CHANGE UP (ref 7–14)
APTT BLD: 29.1 SEC — SIGNIFICANT CHANGE UP (ref 24.5–35.6)
AST SERPL-CCNC: 17 U/L — SIGNIFICANT CHANGE UP (ref 4–32)
BASOPHILS # BLD AUTO: 0.02 K/UL — SIGNIFICANT CHANGE UP (ref 0–0.2)
BASOPHILS NFR BLD AUTO: 0.2 % — SIGNIFICANT CHANGE UP (ref 0–2)
BILIRUB SERPL-MCNC: <0.2 MG/DL — SIGNIFICANT CHANGE UP (ref 0.2–1.2)
BLD GP AB SCN SERPL QL: NEGATIVE — SIGNIFICANT CHANGE UP
BLOOD GAS VENOUS COMPREHENSIVE RESULT: SIGNIFICANT CHANGE UP
BUN SERPL-MCNC: 20 MG/DL — SIGNIFICANT CHANGE UP (ref 7–23)
CALCIUM SERPL-MCNC: 10.5 MG/DL — SIGNIFICANT CHANGE UP (ref 8.4–10.5)
CHLORIDE SERPL-SCNC: 105 MMOL/L — SIGNIFICANT CHANGE UP (ref 98–107)
CO2 SERPL-SCNC: 26 MMOL/L — SIGNIFICANT CHANGE UP (ref 22–31)
CREAT SERPL-MCNC: 1.37 MG/DL — HIGH (ref 0.5–1.3)
EGFR: 42 ML/MIN/1.73M2 — LOW
EOSINOPHIL # BLD AUTO: 0.02 K/UL — SIGNIFICANT CHANGE UP (ref 0–0.5)
EOSINOPHIL NFR BLD AUTO: 0.2 % — SIGNIFICANT CHANGE UP (ref 0–6)
GLUCOSE SERPL-MCNC: 103 MG/DL — HIGH (ref 70–99)
HCT VFR BLD CALC: 38 % — SIGNIFICANT CHANGE UP (ref 34.5–45)
HGB BLD-MCNC: 12.2 G/DL — SIGNIFICANT CHANGE UP (ref 11.5–15.5)
IANC: 9.01 K/UL — HIGH (ref 1.8–7.4)
IMM GRANULOCYTES NFR BLD AUTO: 0.3 % — SIGNIFICANT CHANGE UP (ref 0–0.9)
INR BLD: 0.96 RATIO — SIGNIFICANT CHANGE UP (ref 0.85–1.18)
LIDOCAIN IGE QN: 27 U/L — SIGNIFICANT CHANGE UP (ref 7–60)
LYMPHOCYTES # BLD AUTO: 1.52 K/UL — SIGNIFICANT CHANGE UP (ref 1–3.3)
LYMPHOCYTES # BLD AUTO: 13.9 % — SIGNIFICANT CHANGE UP (ref 13–44)
MCHC RBC-ENTMCNC: 29.5 PG — SIGNIFICANT CHANGE UP (ref 27–34)
MCHC RBC-ENTMCNC: 32.1 GM/DL — SIGNIFICANT CHANGE UP (ref 32–36)
MCV RBC AUTO: 92 FL — SIGNIFICANT CHANGE UP (ref 80–100)
MONOCYTES # BLD AUTO: 0.36 K/UL — SIGNIFICANT CHANGE UP (ref 0–0.9)
MONOCYTES NFR BLD AUTO: 3.3 % — SIGNIFICANT CHANGE UP (ref 2–14)
NEUTROPHILS # BLD AUTO: 9.01 K/UL — HIGH (ref 1.8–7.4)
NEUTROPHILS NFR BLD AUTO: 82.1 % — HIGH (ref 43–77)
NRBC # BLD: 0 /100 WBCS — SIGNIFICANT CHANGE UP (ref 0–0)
NRBC # FLD: 0 K/UL — SIGNIFICANT CHANGE UP (ref 0–0)
PLATELET # BLD AUTO: 412 K/UL — HIGH (ref 150–400)
POTASSIUM SERPL-MCNC: 4 MMOL/L — SIGNIFICANT CHANGE UP (ref 3.5–5.3)
POTASSIUM SERPL-SCNC: 4 MMOL/L — SIGNIFICANT CHANGE UP (ref 3.5–5.3)
PROT SERPL-MCNC: 8.4 G/DL — HIGH (ref 6–8.3)
PROTHROM AB SERPL-ACNC: 10.8 SEC — SIGNIFICANT CHANGE UP (ref 9.5–13)
RBC # BLD: 4.13 M/UL — SIGNIFICANT CHANGE UP (ref 3.8–5.2)
RBC # FLD: 12.4 % — SIGNIFICANT CHANGE UP (ref 10.3–14.5)
RH IG SCN BLD-IMP: POSITIVE — SIGNIFICANT CHANGE UP
SODIUM SERPL-SCNC: 141 MMOL/L — SIGNIFICANT CHANGE UP (ref 135–145)
WBC # BLD: 10.96 K/UL — HIGH (ref 3.8–10.5)
WBC # FLD AUTO: 10.96 K/UL — HIGH (ref 3.8–10.5)

## 2023-09-10 PROCEDURE — 99222 1ST HOSP IP/OBS MODERATE 55: CPT | Mod: GC

## 2023-09-10 PROCEDURE — 99285 EMERGENCY DEPT VISIT HI MDM: CPT

## 2023-09-10 PROCEDURE — 74177 CT ABD & PELVIS W/CONTRAST: CPT | Mod: 26,MA

## 2023-09-10 PROCEDURE — 71045 X-RAY EXAM CHEST 1 VIEW: CPT | Mod: 26

## 2023-09-10 RX ORDER — TETRACAINE/BENZOCAINE/BUTAMBEN 2%-14%-2%
1 OINTMENT (GRAM) TOPICAL THREE TIMES A DAY
Refills: 0 | Status: DISCONTINUED | OUTPATIENT
Start: 2023-09-10 | End: 2023-09-12

## 2023-09-10 RX ORDER — HEPARIN SODIUM 5000 [USP'U]/ML
5000 INJECTION INTRAVENOUS; SUBCUTANEOUS EVERY 8 HOURS
Refills: 0 | Status: DISCONTINUED | OUTPATIENT
Start: 2023-09-10 | End: 2023-09-12

## 2023-09-10 RX ORDER — SODIUM CHLORIDE 9 MG/ML
1000 INJECTION INTRAMUSCULAR; INTRAVENOUS; SUBCUTANEOUS ONCE
Refills: 0 | Status: COMPLETED | OUTPATIENT
Start: 2023-09-10 | End: 2023-09-10

## 2023-09-10 RX ORDER — LEVOTHYROXINE SODIUM 125 MCG
90 TABLET ORAL
Refills: 0 | Status: DISCONTINUED | OUTPATIENT
Start: 2023-09-10 | End: 2023-09-10

## 2023-09-10 RX ORDER — OLMESARTAN MEDOXOMIL-HYDROCHLOROTHIAZIDE 25; 40 MG/1; MG/1
1 TABLET, FILM COATED ORAL
Qty: 0 | Refills: 0 | DISCHARGE

## 2023-09-10 RX ORDER — MORPHINE SULFATE 50 MG/1
4 CAPSULE, EXTENDED RELEASE ORAL ONCE
Refills: 0 | Status: DISCONTINUED | OUTPATIENT
Start: 2023-09-10 | End: 2023-09-10

## 2023-09-10 RX ORDER — ONDANSETRON 8 MG/1
4 TABLET, FILM COATED ORAL ONCE
Refills: 0 | Status: COMPLETED | OUTPATIENT
Start: 2023-09-10 | End: 2023-09-10

## 2023-09-10 RX ORDER — LEVOTHYROXINE SODIUM 125 MCG
90 TABLET ORAL
Refills: 0 | Status: DISCONTINUED | OUTPATIENT
Start: 2023-09-10 | End: 2023-09-12

## 2023-09-10 RX ORDER — SODIUM CHLORIDE 9 MG/ML
1000 INJECTION, SOLUTION INTRAVENOUS
Refills: 0 | Status: DISCONTINUED | OUTPATIENT
Start: 2023-09-10 | End: 2023-09-12

## 2023-09-10 RX ORDER — KETOROLAC TROMETHAMINE 30 MG/ML
15 SYRINGE (ML) INJECTION ONCE
Refills: 0 | Status: DISCONTINUED | OUTPATIENT
Start: 2023-09-10 | End: 2023-09-10

## 2023-09-10 RX ORDER — VERAPAMIL HCL 240 MG
1 CAPSULE, EXTENDED RELEASE PELLETS 24 HR ORAL
Qty: 0 | Refills: 0 | DISCHARGE

## 2023-09-10 RX ORDER — ACETAMINOPHEN 500 MG
1000 TABLET ORAL ONCE
Refills: 0 | Status: COMPLETED | OUTPATIENT
Start: 2023-09-10 | End: 2023-09-10

## 2023-09-10 RX ADMIN — SODIUM CHLORIDE 1000 MILLILITER(S): 9 INJECTION INTRAMUSCULAR; INTRAVENOUS; SUBCUTANEOUS at 10:22

## 2023-09-10 RX ADMIN — Medication 1 SPRAY(S): at 22:19

## 2023-09-10 RX ADMIN — Medication 400 MILLIGRAM(S): at 04:32

## 2023-09-10 RX ADMIN — Medication 1000 MILLIGRAM(S): at 04:47

## 2023-09-10 RX ADMIN — Medication 90 MICROGRAM(S): at 21:47

## 2023-09-10 RX ADMIN — HEPARIN SODIUM 5000 UNIT(S): 5000 INJECTION INTRAVENOUS; SUBCUTANEOUS at 13:35

## 2023-09-10 RX ADMIN — MORPHINE SULFATE 4 MILLIGRAM(S): 50 CAPSULE, EXTENDED RELEASE ORAL at 05:32

## 2023-09-10 RX ADMIN — MORPHINE SULFATE 4 MILLIGRAM(S): 50 CAPSULE, EXTENDED RELEASE ORAL at 05:47

## 2023-09-10 RX ADMIN — SODIUM CHLORIDE 125 MILLILITER(S): 9 INJECTION, SOLUTION INTRAVENOUS at 23:02

## 2023-09-10 RX ADMIN — HEPARIN SODIUM 5000 UNIT(S): 5000 INJECTION INTRAVENOUS; SUBCUTANEOUS at 22:18

## 2023-09-10 RX ADMIN — SODIUM CHLORIDE 1000 MILLILITER(S): 9 INJECTION INTRAMUSCULAR; INTRAVENOUS; SUBCUTANEOUS at 11:50

## 2023-09-10 RX ADMIN — Medication 15 MILLIGRAM(S): at 12:09

## 2023-09-10 RX ADMIN — Medication 400 MILLIGRAM(S): at 23:44

## 2023-09-10 RX ADMIN — SODIUM CHLORIDE 1000 MILLILITER(S): 9 INJECTION INTRAMUSCULAR; INTRAVENOUS; SUBCUTANEOUS at 04:32

## 2023-09-10 RX ADMIN — SODIUM CHLORIDE 125 MILLILITER(S): 9 INJECTION, SOLUTION INTRAVENOUS at 12:35

## 2023-09-10 RX ADMIN — Medication 15 MILLIGRAM(S): at 12:37

## 2023-09-10 RX ADMIN — ONDANSETRON 4 MILLIGRAM(S): 8 TABLET, FILM COATED ORAL at 07:47

## 2023-09-10 NOTE — H&P ADULT - ATTENDING COMMENTS
Adhesive small bowel obstruction    a.  Admit to B surgery - JERMAINE JASON  b.  Keep nil per OS  c.  Maintain IVF resuscitation  d.  May place NGT to low wall suction  e.  Obtain CBC, BMP q 24 and as needed  f.  Maintain DVT prophylaxis with Venodyne / heparin SQ/ lovenox  G.  CT of abdomen and pelvis/plain abdominal films reviewed  f.  Once decompressed, may perform water soluble contrast challenge per ACS protocol  g.  Strict I and O's

## 2023-09-10 NOTE — ED PROVIDER NOTE - PROGRESS NOTE DETAILS
Bishnu, PGY3 - Received sign-out on patient. Introduced myself and updated patient on the medical evaluation process. Patient aware and in agreement. Placed an US IV 2/2 the two prior US IVs blowing, appears to be due to deep veins. patient to get meds and CT now Bishnu, PGY3 - Patient CT shows SBO, patient notified and updated. paged surgery Bishnu, PGY3 - surgery aware, will come see patient. Patient requesting to use the bathroom prior to NG placement Bishnu, PGY3 - NG tube placed with adequate drainage. surgery at bedside.

## 2023-09-10 NOTE — ED ADULT NURSE NOTE - OBJECTIVE STATEMENT
Pt presents to the ED for c/o abdominal pain and vomiting. Hx of obstructions, not actively vomiting. Denies diarrhea  and constipation. A&OX4 and in no acute distress. Respirations even and unlabored. All safety initiated and maintained. Plan of care ongoing.

## 2023-09-10 NOTE — ED PROVIDER NOTE - ATTENDING CONTRIBUTION TO CARE
I, Dr Merlin Valles wrote the initial note in its entirety and the resident only contributed to the progress note and disposition with which I agree.

## 2023-09-10 NOTE — ED PROVIDER NOTE - OBJECTIVE STATEMENT
69 y/o female w/ Past Medical History of HTN and Hypothyroidism and w/ PSH of c-sections, left partial nephrectomy, hysterectomy with 13 previous SBOs managed nonoperatively in the last presents with 1 day history of increasing diffuse abdominal pain that feels similar to prior SBOs. 3/10 pain at this time. started off as feeling crampy abd pain, went to bed, woke up due to severe 10/10 periumbilical pain, N/V without blood last was 2 hours prior to arrival, which improved her pain but came to ED for eval due to her history. Last flatus was 6 hours ago. Denies fevers, chills, dizziness, chest pain, SOB, dysuria, hematuria.    Chart review shows pt was managed non operatively last episode.

## 2023-09-10 NOTE — ED ADULT NURSE REASSESSMENT NOTE - NS ED NURSE REASSESS COMMENT FT1
Received report from night shift RN. Pt resting comfortably in bed, reports mild relief of nausea after medication. Pt pending CT scan, will continue to monitor.

## 2023-09-10 NOTE — H&P ADULT - HISTORY OF PRESENT ILLNESS
68F PMH HTN (controlled, not on Rx), Hypothyroidism, multiple SBOs (managed non-medically), PSH C section, hysterectomy (2003), L partial nephrectomy for RCC (2007), p/t ED for n/v, concerned for SBO. Denies fever, chills, n/v, CP, SOB, last BM and gas yesterday.      ED Course  - Afebrile, VSS, CBC 11, BMP normal, lactate 1.4. CT shows TP in anterior abdominal wall. NGT placed -- ~100ml, clear fluid produced.     Surgery consulted for SBO management

## 2023-09-10 NOTE — ED PROCEDURE NOTE - ATTENDING CONTRIBUTION TO CARE
Directly supervised procedure with no complications. Pt tolerated well.
DR. DANIELS, ATTENDING MD-  I was in the exam room and observed and supervised the resident when they were completing the key portions of this procedure.
DR. DANIELS, ATTENDING MD-  I was in the exam room and observed and supervised the resident when they were completing the key portions of this procedure.

## 2023-09-10 NOTE — ED ADULT TRIAGE NOTE - CHIEF COMPLAINT QUOTE
Pt st" I have an obstruction.....I have had this many times....last time year ago. Since last night I am having on and off abd pains and then on way here I vomited, now I have no pain."

## 2023-09-10 NOTE — ED PROVIDER NOTE - PHYSICAL EXAMINATION
[facility]    [place] [id number]    Phone:  [phone]       Thank You for choosing us for your health care visit. We are glad to serve you and happy to provide you with this summary of your visit. Please help us to ensure we have accurate records. If you find anything that needs to be changed, please let our staff know as soon as possible.          Your Demographic Information     Patient Name Sex Alethea Alvarez Female 1958       Ethnic Group Patient Race    Not of  or  Origin White      Your Visit Details     Date & Time Provider Department    3/20/2017 10:00 AM Shazia Oscar NP [facility]      Your Upcoming Appointment*(Max 10)     2017 12:00 PM CDT   Lab Visit with [facility]   [facility] ([facility])    [place] [id number] [phone]            2017  9:00 AM CDT   Lab Visit with [facility]   [facility] ([facility])    [place] [id number] [phone]            2017  3:40 PM CDT   Office Visit with Shazia Oscar NP   [facility] ([facility])    [place] [id number] [phone]              Your To Do List     Future Orders Please Complete On or Around Expires    HEPATITIS C ANTIBODY WITH REFLEX  Mar 20, 2017 2017    Follow-Up    Return in about 6 months (around 2017) for [employer].      Conditions Discussed Today or Order-Related Diagnoses        Comments    Depressive disorder, not elsewhere classified    -  Primary     Essential hypertension, benign         Gastroesophageal reflux disease without esophagitis         Hyperlipidemia, unspecified hyperlipidemia type         Allergic rhinitis, unspecified allergic rhinitis trigger, unspecified rhinitis seasonality         Need for hepatitis C  screening test           Your Vitals Were     BP Pulse Resp Height Weight LMP    112/60 70 14 5' 5.5\" (1.664 m) 229 lb (103.9 kg) 02/02/2014    BMI Smoking Status                37.53 kg/m2 Former Smoker          Medications Prescribed or Re-Ordered Today     loratadine (CLARITIN) 10 MG tablet    Sig - Route: Take 1 tablet by mouth daily. - Oral    Class: Eprescribe    Pharmacy: [email or web address] [place] [address] AT [address] Ph #: [phone]    losartan (COZAAR) 50 MG tablet    Sig - Route: Take 1 tablet by mouth daily. - Oral    Class: Eprescribe    Pharmacy: [email or web address] [place] [address] AT [address] Ph #: [phone]    hydrochlorothiazide (HYDRODIURIL) 25 MG tablet    Sig: Take 1 TAB PO daily    Class: Eprescribe    Pharmacy: [email or web address] [place] [address] AT [address] Ph #: [phone]    Notes to Pharmacy: Note dosage decrease    citalopram (CELEXA) 10 MG tablet    Sig: Take 1 PO daily    Class: Eprescribe    Pharmacy: [email or web address] [place] [address] AT [address] Ph #: [phone]    Notes to Pharmacy: **Patient requests 90 days supply**    atorvastatin (LIPITOR) 10 MG tablet    Sig - Route: Take 1 tablet by mouth daily. - Oral    Class: Eprescribe    Pharmacy: [email or web address] [place] [address] AT [address] Ph #: [phone]    omeprazole (PRILOSEC) 20 MG capsule    Sig: Take 1 TAB PO BID    Class: Eprescribe    Pharmacy: [email or web address] [place] [address] AT [address] Ph #: [phone]    Notes to Pharmacy: Note dose increase to BID      Your Current Medications Are        Disp Refills Start End    loratadine (CLARITIN) 10 MG tablet 90 tablet 1 3/20/2017     Sig - Route: Take 1 tablet by mouth daily. - Oral    Class: Eprescribe    losartan (COZAAR) 50 MG tablet 90 tablet 1 3/20/2017     Sig - Route: Take 1 tablet by mouth daily. - Oral     Class: Eprescribe    hydrochlorothiazide (HYDRODIURIL) 25 MG tablet 90 tablet 1 3/20/2017     Sig: Take 1 TAB PO daily    Class: Eprescribe    Notes to Pharmacy: Note dosage decrease    citalopram (CELEXA) 10 MG tablet 90 tablet 1 3/20/2017     Sig: Take 1 PO daily    Class: Eprescribe    Notes to Pharmacy: **Patient requests 90 days supply**    atorvastatin (LIPITOR) 10 MG tablet 90 tablet 1 3/20/2017     Sig - Route: Take 1 tablet by mouth daily. - Oral    Class: Eprescribe    omeprazole (PRILOSEC) 20 MG capsule 180 capsule 1 3/20/2017     Sig: Take 1 TAB PO BID    Class: Eprescribe    Notes to Pharmacy: Note dose increase to BID    celecoxib (CELEBREX) 100 MG capsule 60 capsule 1 1/19/2017     Sig: Fill as work comp.  Take one pill twice a day for anti-inflammatory    albuterol (PROAIR HFA) 108 (90 BASE) MCG/ACT inhaler 1 Inhaler 4 7/14/2016     Sig - Route: Inhale 2 puffs into the lungs every 4 hours as needed for Shortness of Breath or Wheezing. - Inhalation    Class: Eprescribe    fluticasone (FLONASE) 50 MCG/ACT nasal spray 3 Bottle 1 3/21/2016     Sig - Route: Spray 2 sprays in each nostril daily. - Nasal    Class: Eprescribe    Notes to Pharmacy: **Patient requests 90 days supply**    Ascorbic Acid (VITAMIN C) 500 MG tablet        Sig - Route: Take 500 mg by mouth daily. - Oral    Class: Historical Med    Multiple Vitamins-Minerals (WOMENS 50+ MULTI VITAMIN/MIN PO)        Sig - Route: Take 1 capsule by mouth daily. - Oral    Class: Historical Med    Calcium Carbonate Antacid (TUMS PO)        Sig - Route: Take  by mouth as needed. - Oral    Class: Historical Med      Discontinued Medications        Reason for Discontinue    traMADOL (ULTRAM) 50 MG tablet Therapy Completed      Allergies     Dander     Dust     Grass     Trees       Immunizations History as of 3/20/2017     Name Date    INFLUENZA QUADRIVALENT 9/19/2016 10:55 AM, 9/21/2015  9:50 AM    Tdap 9/21/2015  9:51 AM      Problem List as of 3/20/2017      Allergic rhinitis, cause unspecified    Depressive disorder, not elsewhere classified    Osteoarthritis of both knees, and hands    Hyperlipidemia    GERD (gastroesophageal reflux disease)    Essential hypertension, benign    Cellulitis of big toe of left foot            Patient Instructions     None       not in acute distress at this time.  Heart is regular rate and rhythm lungs are clear to auscultation abdomen is tender diffusely but mostly in the epigastric and periumbilical region with guarding.  No rebound.  Negative McBurney's and negative Vallejo's.  No CVA tenderness or bilaterally.

## 2023-09-10 NOTE — H&P ADULT - NSHPLABSRESULTS_GEN_ALL_CORE
12.2  10.96 )-----------( 412    ( 10 Sep 2023 04:10 )             38.0  141  |  105  |  20  ----------------------------<  103<H>    (09-10)  4.0   |  26  |  1.37<H>            Ca    10.5      09-10  Mg    x  Phos  x        LIVER FUNCTIONS - ( 10 Sep 2023 04:10 )  Alb: 4.1 g/dL / Pro: 8.4 g/dL / ALK PHOS: 62 U/L / ALT: 21 U/L / AST: 17 U/L / GGT: x      PT/INR - ( 10 Sep 2023 05:32 )   PT: 10.8 sec;   INR: 0.96 ratio         PTT - ( 10 Sep 2023 05:32 )  PTT:29.1 sec  Urinalysis Basic - ( 10 Sep 2023 04:10 )    Color: x / Appearance: x / SG: x / pH: x  Gluc: 103 mg/dL / Ketone: x  / Bili: x / Urobili: x   Blood: x / Protein: x / Nitrite: x   Leuk Esterase: x / RBC: x / WBC x   Sq Epi: x / Non Sq Epi: x / Bacteria: x

## 2023-09-10 NOTE — H&P ADULT - ASSESSMENT
68F PMH HTN (controlled, not on Rx), Hypothyroidism, multiple SBOs (managed non-medically), PSH C section, hysterectomy (2003), L partial nephrectomy for RCC (2007), p/t ED for n/v, concerned for SBO. Dx - SBO. last BM and passing gas last night.     PLAN  - Admit to Dr Sotomayor  - NPO/IVF  - NGT to low wall suction  - Transition PO to IV Rx  - IV PPI    Discussed with Dr Wang team  [id number]

## 2023-09-10 NOTE — ED PROCEDURE NOTE - PROCEDURE NAME, MLM
Point of Care Ultrasound Vascular Access

## 2023-09-10 NOTE — ED PROCEDURE NOTE - PROCEDURE ADDITIONAL DETAILS
Emergency Department Focused Ultrasound performed at patient's bedside for placement of ultrasound guided IV. The study was confirmed with blood return and ease of flushing saline.    Upper extremity laterality: left  IV Gauge: 20

## 2023-09-10 NOTE — ED PROVIDER NOTE - CLINICAL SUMMARY MEDICAL DECISION MAKING FREE TEXT BOX
68-year-old female with multiple previous surgeries in her abdomen pelvis including hysterectomy, laparoscopy, C-sections that has had 13 previous SBO's in the past usually managed nonop.  Patient is back for umbilical pain sharp improved with vomiting.  Concern again is for SBO given her previous history we will obtain labs and imaging and provide medications and make patient n.p.o. at this time.  Will reassess when CT imaging is back.  In the meantime we will control for comfort and most likely surgical consult if SBO shown on CT.

## 2023-09-10 NOTE — H&P ADULT - NSHPPHYSICALEXAM_GEN_ALL_CORE
GENERAL: NAD, lying in bed   NEURO: AOx3, awake alert appropriate  HEENT: NCAT, trachea midline  PULM: Respirations non-labored  ABD: Soft, +RUQ/epigastric tender, non-distended, no peritonitis/rebound tenderness,   EXT: Warm, well perfused

## 2023-09-11 LAB
ANION GAP SERPL CALC-SCNC: 11 MMOL/L — SIGNIFICANT CHANGE UP (ref 7–14)
BASOPHILS # BLD AUTO: 0.01 K/UL — SIGNIFICANT CHANGE UP (ref 0–0.2)
BASOPHILS NFR BLD AUTO: 0.1 % — SIGNIFICANT CHANGE UP (ref 0–2)
BUN SERPL-MCNC: 18 MG/DL — SIGNIFICANT CHANGE UP (ref 7–23)
CALCIUM SERPL-MCNC: 9.4 MG/DL — SIGNIFICANT CHANGE UP (ref 8.4–10.5)
CHLORIDE SERPL-SCNC: 108 MMOL/L — HIGH (ref 98–107)
CO2 SERPL-SCNC: 23 MMOL/L — SIGNIFICANT CHANGE UP (ref 22–31)
CREAT SERPL-MCNC: 1.45 MG/DL — HIGH (ref 0.5–1.3)
EGFR: 39 ML/MIN/1.73M2 — LOW
EOSINOPHIL # BLD AUTO: 0.15 K/UL — SIGNIFICANT CHANGE UP (ref 0–0.5)
EOSINOPHIL NFR BLD AUTO: 1.6 % — SIGNIFICANT CHANGE UP (ref 0–6)
GLUCOSE SERPL-MCNC: 78 MG/DL — SIGNIFICANT CHANGE UP (ref 70–99)
HCT VFR BLD CALC: 31.9 % — LOW (ref 34.5–45)
HGB BLD-MCNC: 10.4 G/DL — LOW (ref 11.5–15.5)
IANC: 5.62 K/UL — SIGNIFICANT CHANGE UP (ref 1.8–7.4)
IMM GRANULOCYTES NFR BLD AUTO: 0.2 % — SIGNIFICANT CHANGE UP (ref 0–0.9)
LYMPHOCYTES # BLD AUTO: 2.87 K/UL — SIGNIFICANT CHANGE UP (ref 1–3.3)
LYMPHOCYTES # BLD AUTO: 30.8 % — SIGNIFICANT CHANGE UP (ref 13–44)
MAGNESIUM SERPL-MCNC: 1.9 MG/DL — SIGNIFICANT CHANGE UP (ref 1.6–2.6)
MCHC RBC-ENTMCNC: 30 PG — SIGNIFICANT CHANGE UP (ref 27–34)
MCHC RBC-ENTMCNC: 32.6 GM/DL — SIGNIFICANT CHANGE UP (ref 32–36)
MCV RBC AUTO: 91.9 FL — SIGNIFICANT CHANGE UP (ref 80–100)
MONOCYTES # BLD AUTO: 0.65 K/UL — SIGNIFICANT CHANGE UP (ref 0–0.9)
MONOCYTES NFR BLD AUTO: 7 % — SIGNIFICANT CHANGE UP (ref 2–14)
NEUTROPHILS # BLD AUTO: 5.62 K/UL — SIGNIFICANT CHANGE UP (ref 1.8–7.4)
NEUTROPHILS NFR BLD AUTO: 60.3 % — SIGNIFICANT CHANGE UP (ref 43–77)
NRBC # BLD: 0 /100 WBCS — SIGNIFICANT CHANGE UP (ref 0–0)
NRBC # FLD: 0 K/UL — SIGNIFICANT CHANGE UP (ref 0–0)
PHOSPHATE SERPL-MCNC: 3.7 MG/DL — SIGNIFICANT CHANGE UP (ref 2.5–4.5)
PLATELET # BLD AUTO: 350 K/UL — SIGNIFICANT CHANGE UP (ref 150–400)
POTASSIUM SERPL-MCNC: 3.7 MMOL/L — SIGNIFICANT CHANGE UP (ref 3.5–5.3)
POTASSIUM SERPL-SCNC: 3.7 MMOL/L — SIGNIFICANT CHANGE UP (ref 3.5–5.3)
RBC # BLD: 3.47 M/UL — LOW (ref 3.8–5.2)
RBC # FLD: 12.7 % — SIGNIFICANT CHANGE UP (ref 10.3–14.5)
SODIUM SERPL-SCNC: 142 MMOL/L — SIGNIFICANT CHANGE UP (ref 135–145)
WBC # BLD: 9.32 K/UL — SIGNIFICANT CHANGE UP (ref 3.8–10.5)
WBC # FLD AUTO: 9.32 K/UL — SIGNIFICANT CHANGE UP (ref 3.8–10.5)

## 2023-09-11 PROCEDURE — 74018 RADEX ABDOMEN 1 VIEW: CPT | Mod: 26,77

## 2023-09-11 PROCEDURE — 99232 SBSQ HOSP IP/OBS MODERATE 35: CPT | Mod: GC

## 2023-09-11 PROCEDURE — 74018 RADEX ABDOMEN 1 VIEW: CPT | Mod: 26

## 2023-09-11 RX ORDER — INFLUENZA VIRUS VACCINE 15; 15; 15; 15 UG/.5ML; UG/.5ML; UG/.5ML; UG/.5ML
0.7 SUSPENSION INTRAMUSCULAR ONCE
Refills: 0 | Status: DISCONTINUED | OUTPATIENT
Start: 2023-09-11 | End: 2023-09-12

## 2023-09-11 RX ORDER — ACETAMINOPHEN 500 MG
1000 TABLET ORAL EVERY 6 HOURS
Refills: 0 | Status: DISCONTINUED | OUTPATIENT
Start: 2023-09-11 | End: 2023-09-12

## 2023-09-11 RX ORDER — DIATRIZOATE MEGLUMINE 180 MG/ML
120 INJECTION, SOLUTION INTRAVESICAL ONCE
Refills: 0 | Status: COMPLETED | OUTPATIENT
Start: 2023-09-11 | End: 2023-09-11

## 2023-09-11 RX ORDER — POTASSIUM CHLORIDE 20 MEQ
20 PACKET (EA) ORAL ONCE
Refills: 0 | Status: DISCONTINUED | OUTPATIENT
Start: 2023-09-11 | End: 2023-09-12

## 2023-09-11 RX ADMIN — DIATRIZOATE MEGLUMINE 120 MILLILITER(S): 180 INJECTION, SOLUTION INTRAVESICAL at 11:03

## 2023-09-11 RX ADMIN — Medication 1 SPRAY(S): at 23:35

## 2023-09-11 RX ADMIN — Medication 1000 MILLIGRAM(S): at 14:43

## 2023-09-11 RX ADMIN — Medication 400 MILLIGRAM(S): at 23:34

## 2023-09-11 RX ADMIN — HEPARIN SODIUM 5000 UNIT(S): 5000 INJECTION INTRAVENOUS; SUBCUTANEOUS at 23:35

## 2023-09-11 RX ADMIN — Medication 400 MILLIGRAM(S): at 14:27

## 2023-09-11 RX ADMIN — HEPARIN SODIUM 5000 UNIT(S): 5000 INJECTION INTRAVENOUS; SUBCUTANEOUS at 13:26

## 2023-09-11 RX ADMIN — Medication 90 MICROGRAM(S): at 23:41

## 2023-09-11 RX ADMIN — Medication 90 MICROGRAM(S): at 06:32

## 2023-09-11 RX ADMIN — HEPARIN SODIUM 5000 UNIT(S): 5000 INJECTION INTRAVENOUS; SUBCUTANEOUS at 06:33

## 2023-09-11 RX ADMIN — Medication 1000 MILLIGRAM(S): at 00:14

## 2023-09-11 NOTE — PROGRESS NOTE ADULT - ASSESSMENT
68F PMH HTN (controlled, not on Rx), Hypothyroidism, multiple SBOs (managed non-medically), PSH C section, hysterectomy (2003), L partial nephrectomy for RCC (2007), p/t ED for n/v, concerned for SBO    PLAN  - NPO/IVF  - NGT to low wall suction  - Transition PO to IV Rx  - IV PPI  - DVT PPX   68F PMH HTN (controlled, not on Rx), Hypothyroidism, multiple SBOs (managed non-medically), PSH C section, hysterectomy (2003), L partial nephrectomy for RCC (2007), p/t ED for n/v, concerned for SBO    PLAN  - Gastrogaffin challenge today, f/u abdominal x-ray  - NPO/IVF  - NGT to low wall suction  - Transition PO to IV Rx  - IV PPI  - DVT PPX

## 2023-09-11 NOTE — PATIENT PROFILE ADULT - FUNCTIONAL ASSESSMENT - BASIC MOBILITY 6.
4-calculated by average/Not able to assess (calculate score using [facility] averaging method)  3-calculated by average/Not able to assess (calculate score using [facility] averaging method)

## 2023-09-11 NOTE — PROGRESS NOTE ADULT - ATTENDING COMMENTS
recurrent presumed adhesive SBO  will attempt gastrografin challenge for continued non-operative management in the absence of hard indications for operative exploration at present

## 2023-09-11 NOTE — PROVIDER CONTACT NOTE (OTHER) - SITUATION
Patient's only IV removed due to being symptomatic. In need of new IV access for medication administration.

## 2023-09-11 NOTE — PROVIDER CONTACT NOTE (OTHER) - ASSESSMENT
AOx4, VS stable. Left AC IV access has infiltrated and been removed. Patient is a hard stick as per patient and previous access was obtained ultrasound guided.

## 2023-09-11 NOTE — PROGRESS NOTE ADULT - SUBJECTIVE AND OBJECTIVE BOX
Morning Surgical Progress Note  Patient is a 68y old  Female who presents with a chief complaint of SBO (10 Sep 2023 11:48)    SUBJECTIVE: Patient seen and examined at bedside with surgical team, patient without complaints.     Vital Signs Last 24 Hrs  T(C): 36.6 (11 Sep 2023 01:53), Max: 37 (10 Sep 2023 12:09)  T(F): 97.9 (11 Sep 2023 01:53), Max: 98.6 (10 Sep 2023 12:09)  HR: 68 (11 Sep 2023 01:53) (68 - 87)  BP: 146/89 (11 Sep 2023 01:53) (126/80 - 146/89)  BP(mean): --  RR: 18 (11 Sep 2023 01:53) (17 - 18)  SpO2: 100% (11 Sep 2023 01:53) (99% - 100%)    Parameters below as of 11 Sep 2023 01:53  Patient On (Oxygen Delivery Method): room air    I&O's Detail    10 Sep 2023 07:01  -  11 Sep 2023 07:00  --------------------------------------------------------  IN:    IV PiggyBack: 100 mL    Lactated Ringers: 375 mL  Total IN: 475 mL    OUT:    Oral Fluid: 0 mL  Total OUT: 0 mL    Total NET: 475 mL        Medications  MEDICATIONS  (STANDING):  heparin   Injectable 5000 Unit(s) SubCutaneous every 8 hours  influenza  Vaccine (HIGH DOSE) 0.7 milliLiter(s) IntraMuscular once  lactated ringers. 1000 milliLiter(s) (125 mL/Hr) IV Continuous <Continuous>  levothyroxine Injectable 90 MICROGram(s) IV Push <User Schedule>  tetracaine/benzocaine/butamben Spray 1 Spray(s) Topical three times a day    MEDICATIONS  (PRN):    Physical Exam  Constitutional: A&Ox3, NAD  Gastrointestinal: Soft nontender, nondistended  Extremities: Moving all extremities, no edema  Skin: No Rashes, Hematoma, Ecchymosis  LABS:                        10.4   9.32  )-----------( 350      ( 11 Sep 2023 05:30 )             31.9     09-10    141  |  105  |  20  ----------------------------<  103<H>  4.0   |  26  |  1.37<H>    Ca    10.5      10 Sep 2023 04:10    TPro  8.4<H>  /  Alb  4.1  /  TBili  <0.2  /  DBili  x   /  AST  17  /  ALT  21  /  AlkPhos  62  09-10    PT/INR - ( 10 Sep 2023 05:32 )   PT: 10.8 sec;   INR: 0.96 ratio         PTT - ( 10 Sep 2023 05:32 )  PTT:29.1 sec  LIVER FUNCTIONS - ( 10 Sep 2023 04:10 )  Alb: 4.1 g/dL / Pro: 8.4 g/dL / ALK PHOS: 62 U/L / ALT: 21 U/L / AST: 17 U/L / GGT: x           Urinalysis Basic - ( 10 Sep 2023 04:10 )    Color: x / Appearance: x / SG: x / pH: x  Gluc: 103 mg/dL / Ketone: x  / Bili: x / Urobili: x   Blood: x / Protein: x / Nitrite: x   Leuk Esterase: x / RBC: x / WBC x   Sq Epi: x / Non Sq Epi: x / Bacteria: x       Morning Surgical Progress Note  Patient is a 68y old  Female who presents with a chief complaint of SBO (10 Sep 2023 11:48)    SUBJECTIVE: Patient seen and examined at bedside with surgical team, patient without complaints. - flatus/ -BM    Vital Signs Last 24 Hrs  T(C): 36.6 (11 Sep 2023 01:53), Max: 37 (10 Sep 2023 12:09)  T(F): 97.9 (11 Sep 2023 01:53), Max: 98.6 (10 Sep 2023 12:09)  HR: 68 (11 Sep 2023 01:53) (68 - 87)  BP: 146/89 (11 Sep 2023 01:53) (126/80 - 146/89)  BP(mean): --  RR: 18 (11 Sep 2023 01:53) (17 - 18)  SpO2: 100% (11 Sep 2023 01:53) (99% - 100%)    Parameters below as of 11 Sep 2023 01:53  Patient On (Oxygen Delivery Method): room air    I&O's Detail    10 Sep 2023 07:01  -  11 Sep 2023 07:00  --------------------------------------------------------  IN:    IV PiggyBack: 100 mL    Lactated Ringers: 375 mL  Total IN: 475 mL    OUT:    Oral Fluid: 0 mL  Total OUT: 0 mL    Total NET: 475 mL        Medications  MEDICATIONS  (STANDING):  heparin   Injectable 5000 Unit(s) SubCutaneous every 8 hours  influenza  Vaccine (HIGH DOSE) 0.7 milliLiter(s) IntraMuscular once  lactated ringers. 1000 milliLiter(s) (125 mL/Hr) IV Continuous <Continuous>  levothyroxine Injectable 90 MICROGram(s) IV Push <User Schedule>  tetracaine/benzocaine/butamben Spray 1 Spray(s) Topical three times a day    MEDICATIONS  (PRN):    Physical Exam  Constitutional: A&Ox3, NAD  Gastrointestinal: Soft nontender, nondistended  Extremities: Moving all extremities, no edema  Skin: No Rashes, Hematoma, Ecchymosis  LABS:                        10.4   9.32  )-----------( 350      ( 11 Sep 2023 05:30 )             31.9     09-10    141  |  105  |  20  ----------------------------<  103<H>  4.0   |  26  |  1.37<H>    Ca    10.5      10 Sep 2023 04:10    TPro  8.4<H>  /  Alb  4.1  /  TBili  <0.2  /  DBili  x   /  AST  17  /  ALT  21  /  AlkPhos  62  09-10    PT/INR - ( 10 Sep 2023 05:32 )   PT: 10.8 sec;   INR: 0.96 ratio         PTT - ( 10 Sep 2023 05:32 )  PTT:29.1 sec  LIVER FUNCTIONS - ( 10 Sep 2023 04:10 )  Alb: 4.1 g/dL / Pro: 8.4 g/dL / ALK PHOS: 62 U/L / ALT: 21 U/L / AST: 17 U/L / GGT: x           Urinalysis Basic - ( 10 Sep 2023 04:10 )    Color: x / Appearance: x / SG: x / pH: x  Gluc: 103 mg/dL / Ketone: x  / Bili: x / Urobili: x   Blood: x / Protein: x / Nitrite: x   Leuk Esterase: x / RBC: x / WBC x   Sq Epi: x / Non Sq Epi: x / Bacteria: x

## 2023-09-11 NOTE — PATIENT PROFILE ADULT - FALL HARM RISK - HARM RISK INTERVENTIONS

## 2023-09-12 ENCOUNTER — TRANSCRIPTION ENCOUNTER (OUTPATIENT)
Age: 68
End: 2023-09-12

## 2023-09-12 VITALS
DIASTOLIC BLOOD PRESSURE: 76 MMHG | OXYGEN SATURATION: 100 % | SYSTOLIC BLOOD PRESSURE: 145 MMHG | TEMPERATURE: 99 F | HEART RATE: 87 BPM | RESPIRATION RATE: 18 BRPM

## 2023-09-12 LAB
ANION GAP SERPL CALC-SCNC: 17 MMOL/L — HIGH (ref 7–14)
BUN SERPL-MCNC: 17 MG/DL — SIGNIFICANT CHANGE UP (ref 7–23)
CALCIUM SERPL-MCNC: 9.5 MG/DL — SIGNIFICANT CHANGE UP (ref 8.4–10.5)
CHLORIDE SERPL-SCNC: 102 MMOL/L — SIGNIFICANT CHANGE UP (ref 98–107)
CO2 SERPL-SCNC: 23 MMOL/L — SIGNIFICANT CHANGE UP (ref 22–31)
CREAT SERPL-MCNC: 1.39 MG/DL — HIGH (ref 0.5–1.3)
EGFR: 41 ML/MIN/1.73M2 — LOW
GLUCOSE SERPL-MCNC: 66 MG/DL — LOW (ref 70–99)
HCT VFR BLD CALC: 33.8 % — LOW (ref 34.5–45)
HGB BLD-MCNC: 10.5 G/DL — LOW (ref 11.5–15.5)
MAGNESIUM SERPL-MCNC: 1.9 MG/DL — SIGNIFICANT CHANGE UP (ref 1.6–2.6)
MCHC RBC-ENTMCNC: 29 PG — SIGNIFICANT CHANGE UP (ref 27–34)
MCHC RBC-ENTMCNC: 31.1 GM/DL — LOW (ref 32–36)
MCV RBC AUTO: 93.4 FL — SIGNIFICANT CHANGE UP (ref 80–100)
NRBC # BLD: 0 /100 WBCS — SIGNIFICANT CHANGE UP (ref 0–0)
NRBC # FLD: 0 K/UL — SIGNIFICANT CHANGE UP (ref 0–0)
PHOSPHATE SERPL-MCNC: 3.5 MG/DL — SIGNIFICANT CHANGE UP (ref 2.5–4.5)
PLATELET # BLD AUTO: 362 K/UL — SIGNIFICANT CHANGE UP (ref 150–400)
POTASSIUM SERPL-MCNC: 3.5 MMOL/L — SIGNIFICANT CHANGE UP (ref 3.5–5.3)
POTASSIUM SERPL-SCNC: 3.5 MMOL/L — SIGNIFICANT CHANGE UP (ref 3.5–5.3)
RBC # BLD: 3.62 M/UL — LOW (ref 3.8–5.2)
RBC # FLD: 12.7 % — SIGNIFICANT CHANGE UP (ref 10.3–14.5)
SODIUM SERPL-SCNC: 142 MMOL/L — SIGNIFICANT CHANGE UP (ref 135–145)
WBC # BLD: 9.43 K/UL — SIGNIFICANT CHANGE UP (ref 3.8–10.5)
WBC # FLD AUTO: 9.43 K/UL — SIGNIFICANT CHANGE UP (ref 3.8–10.5)

## 2023-09-12 PROCEDURE — 74018 RADEX ABDOMEN 1 VIEW: CPT | Mod: 26

## 2023-09-12 PROCEDURE — 71045 X-RAY EXAM CHEST 1 VIEW: CPT | Mod: 26

## 2023-09-12 PROCEDURE — 99231 SBSQ HOSP IP/OBS SF/LOW 25: CPT

## 2023-09-12 RX ORDER — POTASSIUM CHLORIDE 20 MEQ
40 PACKET (EA) ORAL ONCE
Refills: 0 | Status: COMPLETED | OUTPATIENT
Start: 2023-09-12 | End: 2023-09-12

## 2023-09-12 RX ORDER — LEVOTHYROXINE SODIUM 125 MCG
200 TABLET ORAL DAILY
Refills: 0 | Status: DISCONTINUED | OUTPATIENT
Start: 2023-09-12 | End: 2023-09-12

## 2023-09-12 RX ADMIN — Medication 40 MILLIEQUIVALENT(S): at 09:03

## 2023-09-12 RX ADMIN — Medication 90 MICROGRAM(S): at 06:21

## 2023-09-12 RX ADMIN — Medication 1000 MILLIGRAM(S): at 00:17

## 2023-09-12 RX ADMIN — HEPARIN SODIUM 5000 UNIT(S): 5000 INJECTION INTRAVENOUS; SUBCUTANEOUS at 06:21

## 2023-09-12 RX ADMIN — HEPARIN SODIUM 5000 UNIT(S): 5000 INJECTION INTRAVENOUS; SUBCUTANEOUS at 13:06

## 2023-09-12 NOTE — PROGRESS NOTE ADULT - SUBJECTIVE AND OBJECTIVE BOX
TEAM [ B ] Surgery Daily Progress Note  =====================================================    SUBJECTIVE: Patient seen and examined at bedside on AM rounds. Patient denies any acute complaints. NPO with NGT, denies nausea, vomiting.  +  Flatus/  -  BM. OOB/Amublating as tolerated. Denies fever, chills.      ALLERGIES:  ampicillin (Unknown)      --------------------------------------------------------------------------------------    MEDICATIONS:    Neurologic Medications  acetaminophen   IVPB .. 1000 milliGRAM(s) IV Intermittent every 6 hours    Respiratory Medications    Cardiovascular Medications    Gastrointestinal Medications  lactated ringers. 1000 milliLiter(s) IV Continuous <Continuous>  potassium chloride   Powder 40 milliEquivalent(s) Oral once    Genitourinary Medications    Hematologic/Oncologic Medications  heparin   Injectable 5000 Unit(s) SubCutaneous every 8 hours  influenza  Vaccine (HIGH DOSE) 0.7 milliLiter(s) IntraMuscular once    Antimicrobial/Immunologic Medications    Endocrine/Metabolic Medications  levothyroxine Injectable 90 MICROGram(s) IV Push <User Schedule>    Topical/Other Medications  tetracaine/benzocaine/butamben Spray 1 Spray(s) Topical three times a day    --------------------------------------------------------------------------------------    VITAL SIGNS:  T(C): 36.6 (09-12-23 @ 06:00), Max: 36.7 (09-11-23 @ 09:45)  HR: 76 (09-12-23 @ 06:00) (74 - 84)  BP: 151/91 (09-12-23 @ 06:00) (147/85 - 151/91)  RR: 18 (09-12-23 @ 06:00) (17 - 18)  SpO2: 96% (09-12-23 @ 06:00) (96% - 100%)  --------------------------------------------------------------------------------------    EXAM    General: NAD, resting in bed comfortably.  Cardiac: regular rate, warm and well perfused  Respiratory: Nonlabored respirations, normal cw expansion.  Abdomen: soft, nontender, nondistended.  Extremities: normal strength, FROM, no deformities    --------------------------------------------------------------------------------------    LABS                        10.5   9.43  )-----------( 362      ( 12 Sep 2023 05:10 )             33.8   09-12    142  |  102  |  17  ----------------------------<  66<L>  3.5   |  23  |  1.39<H>    Ca    9.5      12 Sep 2023 05:10  Phos  3.5     09-12  Mg     1.90     09-12      --------------------------------------------------------------------------------------    INS AND OUTS:    09-11-23 @ 07:01  -  09-12-23 @ 07:00  --------------------------------------------------------  IN: 2350 mL / OUT: 2120 mL / NET: 230 mL      --------------------------------------------------------------------------------------

## 2023-09-12 NOTE — DISCHARGE NOTE PROVIDER - HOSPITAL COURSE
Patient is a 68year old female with PMHx HTN (controlled, not on Rx), Hypothyroidism, multiple SBOs (managed non-medically), PSH C section, hysterectomy (2003), L partial nephrectomy for RCC (2007), presented to the ED for n/v, concerned for SBO. Patient was admitted with a small bowel obstruction and treated conservatively with NGT decompression and bowel rest. Patient was given gastrograffin and serial abdominal Xrays were taken, revealing contrast moving throughout the bowel and colon. NGT was removed and their diet was then slowly advanced as tolerated. Once patient was tolerating regular diet, voiding and ambulating without difficulty, they were found to be stable for discharge to home.     At the time of discharge, the patient was hemodynamically stable, was tolerating PO diet, was voiding urine and passing stool. Patient was ambulating and was comfortable with adequate pain control.  The patient was instructed to follow up with Dr. Sotomayor within 1 week after discharge from the hospital.  The patient / family felt comfortable with discharge.  The patient had no other issues.    Per attending, patient deemed medically stable and ready for discharge at this time.

## 2023-09-12 NOTE — DISCHARGE NOTE NURSING/CASE MANAGEMENT/SOCIAL WORK - PATIENT PORTAL LINK FT
You can access the FollowMyHealth Patient Portal offered by [facility] by registering at the following website: [email or web address]. By joining [facility]’s FollowMyHealth portal, you will also be able to view your health information using other applications (apps) compatible with our system.

## 2023-09-12 NOTE — DISCHARGE NOTE PROVIDER - NSDCCPCAREPLAN_GEN_ALL_CORE_FT
PRINCIPAL DISCHARGE DIAGNOSIS  Diagnosis: SBO (small bowel obstruction)  Assessment and Plan of Treatment:   ACTIVITY: No heavy lifting or straining. Otherwise, you may return to your usual level of physical activity. If you are taking narcotic pain medication DO NOT drive a car, operate machinery or make important decisions.  DIET: Return to your usual diet.  NOTIFY YOUR SURGEON IF YOU HAVE: any bleeding that does not stop, any pus draining from your wound(s), any fever (over 100.4 F) persistent nausea/vomiting, or if your pain is not controlled on your discharge pain medications, unable to urinate.  FOLLOW UP:  1. Please follow up with your primary care physician in one week regarding your hospitalization, bring copies of your discharge paperwork.  2. Please follow up with your surgeon, Dr. Sotomayor in 1-2 weeks following your discharge. Please call [phone] to make an appointment.

## 2023-09-12 NOTE — DISCHARGE NOTE PROVIDER - CARE PROVIDER_API CALL
Jamar Sotomayor  Surgery  [address] [place] [address], Level [place] [address]  Phone: [phone]  Fax: [phone]  Follow Up Time: 1 week

## 2023-09-12 NOTE — PROGRESS NOTE ADULT - ASSESSMENT
68F PMH HTN (controlled, not on Rx), Hypothyroidism, multiple SBOs (managed non-medically), PSH C section, hysterectomy (2003), L partial nephrectomy for RCC (2007), p/t ED for n/v, concerned for SBO    PLAN  - NGT removed  - Diet: Clear liquid  - Transition PO to IV Rx  - IV PPI  - DVT PPX    B team [id number]

## 2023-09-20 ENCOUNTER — APPOINTMENT (OUTPATIENT)
Dept: INTERNAL MEDICINE | Facility: CLINIC | Age: 68
End: 2023-09-20

## 2023-10-05 ENCOUNTER — APPOINTMENT (OUTPATIENT)
Dept: INTERNAL MEDICINE | Facility: CLINIC | Age: 68
End: 2023-10-05

## 2023-10-09 ENCOUNTER — APPOINTMENT (OUTPATIENT)
Dept: INTERNAL MEDICINE | Facility: CLINIC | Age: 68
End: 2023-10-09
Payer: MEDICARE

## 2023-10-09 ENCOUNTER — OUTPATIENT (OUTPATIENT)
Dept: OUTPATIENT SERVICES | Facility: HOSPITAL | Age: 68
LOS: 1 days | End: 2023-10-09
Payer: COMMERCIAL

## 2023-10-09 VITALS — HEART RATE: 74 BPM | SYSTOLIC BLOOD PRESSURE: 118 MMHG | DIASTOLIC BLOOD PRESSURE: 70 MMHG | RESPIRATION RATE: 14 BRPM

## 2023-10-09 DIAGNOSIS — E03.9 HYPOTHYROIDISM, UNSPECIFIED: ICD-10-CM

## 2023-10-09 DIAGNOSIS — Z00.00 ENCOUNTER FOR GENERAL ADULT MEDICAL EXAMINATION WITHOUT ABNORMAL FINDINGS: ICD-10-CM

## 2023-10-09 DIAGNOSIS — I10 ESSENTIAL (PRIMARY) HYPERTENSION: ICD-10-CM

## 2023-10-09 PROBLEM — Z87.898 HISTORY OF FATIGUE: Status: RESOLVED | Noted: 2021-05-25 | Resolved: 2023-10-09

## 2023-10-09 PROBLEM — Z87.898 HISTORY OF FLANK PAIN: Status: RESOLVED | Noted: 2019-11-26 | Resolved: 2023-10-09

## 2023-10-09 PROBLEM — U07.1 COVID-19 VIRUS INFECTION: Status: RESOLVED | Noted: 2020-12-09 | Resolved: 2023-10-09

## 2023-10-09 PROBLEM — R45.89 DEPRESSED MOOD: Status: RESOLVED | Noted: 2022-08-17 | Resolved: 2023-10-09

## 2023-10-09 PROBLEM — M17.11 PRIMARY OSTEOARTHRITIS OF RIGHT KNEE: Status: RESOLVED | Noted: 2017-10-17 | Resolved: 2023-10-09

## 2023-10-09 PROCEDURE — G0439: CPT

## 2023-10-09 RX ORDER — CONJUGATED ESTROGENS 0.62 MG/G
0.62 CREAM VAGINAL
Refills: 0 | Status: DISCONTINUED | COMMUNITY
End: 2023-10-09

## 2023-10-11 DIAGNOSIS — R79.89 OTHER SPECIFIED ABNORMAL FINDINGS OF BLOOD CHEMISTRY: ICD-10-CM

## 2023-10-11 LAB
25(OH)D3 SERPL-MCNC: 94.8 NG/ML
ALBUMIN SERPL ELPH-MCNC: 4.1 G/DL
ALP BLD-CCNC: 77 U/L
ALT SERPL-CCNC: 26 U/L
ANION GAP SERPL CALC-SCNC: 14 MMOL/L
AST SERPL-CCNC: 28 U/L
BASOPHILS # BLD AUTO: 0.02 K/UL
BASOPHILS NFR BLD AUTO: 0.3 %
BILIRUB SERPL-MCNC: <0.2 MG/DL
BUN SERPL-MCNC: 21 MG/DL
CALCIUM SERPL-MCNC: 10 MG/DL
CHLORIDE SERPL-SCNC: 103 MMOL/L
CHOLEST SERPL-MCNC: 192 MG/DL
CO2 SERPL-SCNC: 23 MMOL/L
CREAT SERPL-MCNC: 1.5 MG/DL
EGFR: 37 ML/MIN/1.73M2
EOSINOPHIL # BLD AUTO: 0.21 K/UL
EOSINOPHIL NFR BLD AUTO: 2.7 %
ESTIMATED AVERAGE GLUCOSE: 114 MG/DL
GLUCOSE SERPL-MCNC: 77 MG/DL
HBA1C MFR BLD HPLC: 5.6 %
HCT VFR BLD CALC: 37 %
HDLC SERPL-MCNC: 72 MG/DL
HGB BLD-MCNC: 11.4 G/DL
IMM GRANULOCYTES NFR BLD AUTO: 0.3 %
LDLC SERPL CALC-MCNC: 107 MG/DL
LYMPHOCYTES # BLD AUTO: 2.19 K/UL
LYMPHOCYTES NFR BLD AUTO: 28.1 %
MAN DIFF?: NORMAL
MCHC RBC-ENTMCNC: 29.5 PG
MCHC RBC-ENTMCNC: 30.8 GM/DL
MCV RBC AUTO: 95.6 FL
MONOCYTES # BLD AUTO: 0.7 K/UL
MONOCYTES NFR BLD AUTO: 9 %
NEUTROPHILS # BLD AUTO: 4.64 K/UL
NEUTROPHILS NFR BLD AUTO: 59.6 %
NONHDLC SERPL-MCNC: 120 MG/DL
PLATELET # BLD AUTO: 345 K/UL
POTASSIUM SERPL-SCNC: 4 MMOL/L
PROT SERPL-MCNC: 7.5 G/DL
RBC # BLD: 3.87 M/UL
RBC # FLD: 13.6 %
SODIUM SERPL-SCNC: 140 MMOL/L
T4 FREE SERPL-MCNC: 1.3 NG/DL
TRIGL SERPL-MCNC: 72 MG/DL
TSH SERPL-ACNC: 0.01 UIU/ML
WBC # FLD AUTO: 7.78 K/UL

## 2023-10-20 NOTE — PATIENT PROFILE ADULT. - AS SC BRADEN MOISTURE
Addended by: RODRIGO DOMINGUEZ on: 10/20/2023 10:02 AM     Modules accepted: Orders    
(4) rarely moist

## 2023-12-19 ENCOUNTER — APPOINTMENT (OUTPATIENT)
Dept: INTERNAL MEDICINE | Facility: CLINIC | Age: 68
End: 2023-12-19

## 2024-01-12 NOTE — DISCHARGE NOTE ADULT - NS MD DC FALL RISK RISK
For information on Fall & Injury Prevention, visit [email or web address] Statement Selected

## 2024-01-23 ENCOUNTER — APPOINTMENT (OUTPATIENT)
Dept: INTERNAL MEDICINE | Facility: CLINIC | Age: 69
End: 2024-01-23
Payer: MEDICARE

## 2024-01-23 DIAGNOSIS — E03.9 HYPOTHYROIDISM, UNSPECIFIED: ICD-10-CM

## 2024-01-23 PROCEDURE — 99213 OFFICE O/P EST LOW 20 MIN: CPT

## 2024-01-23 PROCEDURE — G2211 COMPLEX E/M VISIT ADD ON: CPT

## 2024-01-23 NOTE — HISTORY OF PRESENT ILLNESS
[de-identified] : RTC to discuss thyroid dose. Traditionally has been on NP thyroid. States had been on 120 mg.  In October, TSH was makedly suppressed, and was re-dosed at 15 mg Felt sluggish with fatigue and weight gain, and self increaed to 30 mg.  Feels better but not 100%

## 2024-02-16 NOTE — HEALTH RISK ASSESSMENT
[Audit-CScore] : 0 [DRN0Apexu] : 0 [Change in mental status noted] : No change in mental status noted [Language] : denies difficulty with language [Behavior] : denies difficulty with behavior [Learning/Retaining New Information] : denies difficulty learning/retaining new information [Handling Complex Tasks] : denies difficulty handling complex tasks [Reasoning] : denies difficulty with reasoning [Spatial Ability and Orientation] : denies difficulty with spatial ability and orientation [Reports changes in hearing] : Reports no changes in hearing [Reports changes in vision] : Reports no changes in vision [Reports changes in dental health] : Reports no changes in dental health

## 2024-02-16 NOTE — HISTORY OF PRESENT ILLNESS
[FreeTextEntry1] : Ms. TAVERAS is here for an annual physical examination and assessment. [de-identified] : She generally feels well with no specific complaints. Her recent health has been good. Recently hospitalized for SBO>  Hasbeen a recurrent problem from adhesions after surgery for kidney cancer.   Denies headache, dizziness. Denies rash, fatigue, fever, weight loss, anorexia. Denies cough, wheezing. Denies CP, SOB, BROWER, edema, palpitations. Denies abdominal pain, N/V/D/C. Denies BRBPR, melena, dysphagia. Denies dysuria, frequency, hematuria, hesitancy. Denies weakness, numbness, gait instability.

## 2024-02-21 ENCOUNTER — APPOINTMENT (OUTPATIENT)
Dept: INTERNAL MEDICINE | Facility: CLINIC | Age: 69
End: 2024-02-21
Payer: MEDICARE

## 2024-02-21 ENCOUNTER — OUTPATIENT (OUTPATIENT)
Dept: OUTPATIENT SERVICES | Facility: HOSPITAL | Age: 69
LOS: 1 days | End: 2024-02-21
Payer: MEDICARE

## 2024-02-21 VITALS
RESPIRATION RATE: 14 BRPM | HEART RATE: 70 BPM | WEIGHT: 242 LBS | BODY MASS INDEX: 37.9 KG/M2 | SYSTOLIC BLOOD PRESSURE: 140 MMHG | DIASTOLIC BLOOD PRESSURE: 90 MMHG

## 2024-02-21 DIAGNOSIS — Z85.528 PERSONAL HISTORY OF OTHER MALIGNANT NEOPLASM OF KIDNEY: ICD-10-CM

## 2024-02-21 LAB
ALBUMIN SERPL ELPH-MCNC: 4.1 G/DL
ANION GAP SERPL CALC-SCNC: 16 MMOL/L
BUN SERPL-MCNC: 17 MG/DL
CALCIUM SERPL-MCNC: 10 MG/DL
CHLORIDE SERPL-SCNC: 102 MMOL/L
CO2 SERPL-SCNC: 20 MMOL/L
CREAT SERPL-MCNC: 1.59 MG/DL
EGFR: 35 ML/MIN/1.73M2
GLUCOSE SERPL-MCNC: 82 MG/DL
PHOSPHATE SERPL-MCNC: 3.4 MG/DL
POTASSIUM SERPL-SCNC: 4.3 MMOL/L
SODIUM SERPL-SCNC: 139 MMOL/L
TSH SERPL-ACNC: 17.6 UIU/ML

## 2024-02-21 PROCEDURE — 99397 PER PM REEVAL EST PAT 65+ YR: CPT

## 2024-02-21 PROCEDURE — G0439: CPT

## 2024-02-21 RX ORDER — LEVOTHYROXINE, LIOTHYRONINE 57; 13.5 UG/1; UG/1
90 TABLET ORAL DAILY
Qty: 90 | Refills: 3 | Status: ACTIVE | COMMUNITY
Start: 2022-06-19 | End: 1900-01-01

## 2024-02-22 DIAGNOSIS — I10 ESSENTIAL (PRIMARY) HYPERTENSION: ICD-10-CM

## 2024-02-28 DIAGNOSIS — Z00.00 ENCOUNTER FOR GENERAL ADULT MEDICAL EXAMINATION WITHOUT ABNORMAL FINDINGS: ICD-10-CM

## 2024-02-28 DIAGNOSIS — Z85.528 PERSONAL HISTORY OF OTHER MALIGNANT NEOPLASM OF KIDNEY: ICD-10-CM

## 2024-03-05 DIAGNOSIS — E27.8 OTHER SPECIFIED DISORDERS OF ADRENAL GLAND: ICD-10-CM

## 2024-03-08 PROBLEM — E27.8 ADRENAL MASS: Status: ACTIVE | Noted: 2024-03-08

## 2024-03-22 NOTE — ED PROVIDER NOTE - RECEIVING PHYSICIAN
Department of Anesthesiology  Preprocedure Note       Name:  Joni Kingsley   Age:  76 y.o.  :  1947                                          MRN:  [id number]         Date:  3/22/2024      Surgeon: Surgeon(s):  Chaka Griffin MD    Procedure: Procedure(s):  COLONOSCOPY    Medications prior to admission:   Prior to Admission medications    Medication Sig Start Date End Date Taking? Authorizing Provider   fenofibrate (TRICOR) 54 MG tablet Take 1 tablet by mouth daily [facility] pharmacy: Please dispense generic fenofibrate unless prescriber denote   Yes Provider, MD Mikel   Multiple Vitamin (MULTIVITAMIN ADULT PO) Take by mouth daily    Provider, MD Mikel   ibuprofen (ADVIL;MOTRIN) 200 MG tablet Take 1 tablet by mouth every 6 hours as needed for Pain    ProviderMikel MD   amLODIPine (NORVASC) 10 MG tablet Take by mouth daily    Automatic Reconciliation, Ar   lisinopril (PRINIVIL;ZESTRIL) 20 MG tablet Take by mouth daily    Automatic Reconciliation, Ar       Current medications:    No current facility-administered medications for this encounter.       Allergies:    Allergies   Allergen Reactions   • Penicillins Other (See Comments)     Childhood allergy unknown reaction    • Shellfish Allergy Nausea And Vomiting       Problem List:    Patient Active Problem List   Diagnosis Code   • Status post reverse arthroplasty of right shoulder Z96.611       Past Medical History:        Diagnosis Date   • Cancer of lip    • History of seizure     52 years ago- only once   • HTN (hypertension)    • Lumbar disc herniation     L5   • Osteoarthritis of hip    • Prostate cancer (HCC)     finished XRT in 12/10       Past Surgical History:        Procedure Laterality Date   • CATARACT REMOVAL      bilateral   • HERNIA REPAIR      bilateral inguinal hernia repair- 2 years apart   • OTHER SURGICAL HISTORY      lip surgery   • PROSTATE BIOPSY, NEEDLE, SATURATION SAMPLING     • SHOULDER SURGERY  Manish

## 2024-03-27 NOTE — PATIENT PROFILE ADULT - PATIENT'S PREFERRED PRONOUN
Central Prior Authorization Team   Phone: [phone]    PA Initiation    Medication: OZEMPIC (0.25 OR 0.5 MG/DOSE) 2 MG/3ML SC SOPN  Insurance Company: [facility] - Phone [phone] Fax [phone]  Pharmacy Filling the Rx: [id number] - [place] - [address] AT [facility]  Filling Pharmacy Phone: [phone]  Filling Pharmacy Fax:    Start Date: 3/26/2024      
Prior Authorization Approval    Authorization Effective Date: 3/27/2024  Authorization Expiration Date: 3/27/2025  Medication: OZEMPIC (0.25 OR 0.5 MG/DOSE) 2 MG/3ML SC SOPN  Reference #:     Insurance Company: [employer] - Phone [phone] Fax [phone]  Which Pharmacy is filling the prescription (Not needed for infusion/clinic administered): [id number] [address] AT [facility] & [address]  Pharmacy Notified: Yes  Patient Notified: Instructed pharmacy to notify patient when script is ready to /ship.       
Prior Authorization Retail Medication Request    Medication/Dose: Ozempic    Diagnosis and ICD code (if different than what is on RX):    New/renewal/insurance change PA/secondary ins. PA:  Previously Tried and Failed:    Rationale:      Insurance   Primary: [employer]  Insurance ID:      Secondary (if applicable):  Insurance ID:      Pharmacy Information (if different than what is on RX)  Name:    Phone:    Fax:    Patient called today and is in need of a prior authorization. RN did explain that the prior auth team is currently behind due to the increased need in prior auths.   Patient understands.     Vivienne Robb RN on 3/14/2024 at 4:36 PM     
Her/She

## 2024-03-29 ENCOUNTER — APPOINTMENT (OUTPATIENT)
Dept: NEPHROLOGY | Facility: CLINIC | Age: 69
End: 2024-03-29
Payer: MEDICARE

## 2024-03-29 ENCOUNTER — LABORATORY RESULT (OUTPATIENT)
Age: 69
End: 2024-03-29

## 2024-03-29 VITALS
WEIGHT: 243 LBS | RESPIRATION RATE: 15 BRPM | SYSTOLIC BLOOD PRESSURE: 122 MMHG | DIASTOLIC BLOOD PRESSURE: 78 MMHG | HEART RATE: 72 BPM | HEIGHT: 67 IN | BODY MASS INDEX: 38.14 KG/M2 | OXYGEN SATURATION: 96 %

## 2024-03-29 DIAGNOSIS — I10 ESSENTIAL (PRIMARY) HYPERTENSION: ICD-10-CM

## 2024-03-29 DIAGNOSIS — N18.30 CHRONIC KIDNEY DISEASE, STAGE 3 UNSPECIFIED: ICD-10-CM

## 2024-03-29 DIAGNOSIS — R80.9 PROTEINURIA, UNSPECIFIED: ICD-10-CM

## 2024-03-29 PROCEDURE — 99213 OFFICE O/P EST LOW 20 MIN: CPT

## 2024-03-29 RX ORDER — TRIAMTERENE AND HYDROCHLOROTHIAZIDE 25; 37.5 MG/1; MG/1
37.5-25 TABLET ORAL DAILY
Qty: 90 | Refills: 3 | Status: DISCONTINUED | COMMUNITY
Start: 2021-12-28 | End: 2024-03-29

## 2024-03-29 NOTE — ASSESSMENT
[FreeTextEntry1] : 1. Elevated creatinine w/ normal BUN. 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  Will check Cystatin C eGFR for a more accurate assessment of GFR. 3. Proteinuria by UA. Maximus check urine protein to creatinine ratio. 3. Hypertension well controlled. Will call on Monday to discuss results.  Follow up as needed.

## 2024-03-29 NOTE — PHYSICAL EXAM
[General Appearance - Alert] : alert [Sclera] : the sclera and conjunctiva were normal [Hearing Threshold Finger Rub Not Gem] : hearing was normal [General Appearance - In No Acute Distress] : in no acute distress [Jugular Venous Distention Increased] : there was no jugular-venous distention [Auscultation Breath Sounds / Voice Sounds] : lungs were clear to auscultation bilaterally [Heart Sounds] : normal S1 and S2 [Murmurs] : no murmurs [Edema] : there was no peripheral edema [Abdomen Tenderness] : non-tender [Urinary Bladder Findings] : the bladder was normal on palpation [No CVA Tenderness] : no ~M costovertebral angle tenderness [] : no rash [FreeTextEntry1] : No flank discomfort noted [Abnormal Walk] : normal gait [No Focal Deficits] : no focal deficits [Oriented To Time, Place, And Person] : oriented to person, place, and time

## 2024-03-29 NOTE — HISTORY OF PRESENT ILLNESS
[FreeTextEntry1] : First visit was 1/1/2 years ago.  The patient experienced lower left flank pain. She is status post partial nephrectomy for cystic RCC by Dr. Gonsalves.  She went for a CT scan which did not show contrast enhancing lesion. Incidentally a possible adrenal nodule was found.  After the CT scan the pain disappeared and has not recurred.  She is here because she is worried about the elevated serum creatinine and the protein in the urine. She feels fine. Her medications were reconciled.

## 2024-04-01 LAB
APPEARANCE: CLEAR
BILIRUBIN URINE: NEGATIVE
BLOOD URINE: NEGATIVE
COLOR: NORMAL
CREAT SPEC-SCNC: 113 MG/DL
CREAT/PROT UR: 0.3 RATIO
CYSTATIN C SERPL-MCNC: 1.58 MG/L
GFR/BSA.PRED SERPLBLD CYS-BASED-ARV: 38 ML/MIN/1.73M2
GLUCOSE QUALITATIVE U: NEGATIVE MG/DL
KETONES URINE: NEGATIVE MG/DL
LEUKOCYTE ESTERASE URINE: NEGATIVE
NITRITE URINE: NEGATIVE
PH URINE: 6
PROT UR-MCNC: 30 MG/DL
PROTEIN URINE: 30 MG/DL
SPECIFIC GRAVITY URINE: 1.02
UROBILINOGEN URINE: 0.2 MG/DL

## 2024-04-10 ENCOUNTER — APPOINTMENT (OUTPATIENT)
Dept: UROLOGY | Facility: CLINIC | Age: 69
End: 2024-04-10
Payer: MEDICARE

## 2024-04-10 VITALS
SYSTOLIC BLOOD PRESSURE: 153 MMHG | TEMPERATURE: 98.8 F | HEIGHT: 67 IN | BODY MASS INDEX: 38.14 KG/M2 | WEIGHT: 243 LBS | HEART RATE: 84 BPM | DIASTOLIC BLOOD PRESSURE: 84 MMHG | RESPIRATION RATE: 17 BRPM

## 2024-04-10 DIAGNOSIS — C64.9 MALIGNANT NEOPLASM OF UNSPECIFIED KIDNEY, EXCEPT RENAL PELVIS: ICD-10-CM

## 2024-04-10 DIAGNOSIS — R10.9 UNSPECIFIED ABDOMINAL PAIN: ICD-10-CM

## 2024-04-10 PROCEDURE — 99204 OFFICE O/P NEW MOD 45 MIN: CPT

## 2024-04-10 PROCEDURE — G2211 COMPLEX E/M VISIT ADD ON: CPT

## 2024-04-10 NOTE — ASSESSMENT
[FreeTextEntry1] : On physical exam she appears to have SI joint pain She has scoloiosis  The left flank pain is not urological  We reviewed the notes , reports and images from Dr Dykes and Dr Burns  We all agree it is not kidney related  she will be moving to the [place]  No need for follow up

## 2024-04-16 ENCOUNTER — APPOINTMENT (OUTPATIENT)
Dept: OBGYN | Facility: CLINIC | Age: 69
End: 2024-04-16

## 2024-04-16 VITALS
DIASTOLIC BLOOD PRESSURE: 78 MMHG | BODY MASS INDEX: 36.92 KG/M2 | WEIGHT: 235.25 LBS | SYSTOLIC BLOOD PRESSURE: 122 MMHG | HEIGHT: 67 IN

## 2024-04-16 PROCEDURE — G0101: CPT

## 2024-04-16 PROCEDURE — 99459 PELVIC EXAMINATION: CPT

## 2024-04-16 PROCEDURE — 99387 INIT PM E/M NEW PAT 65+ YRS: CPT | Mod: GY

## 2024-04-16 RX ORDER — ESTRADIOL 0.1 MG/G
0.1 CREAM VAGINAL
Refills: 0 | Status: ACTIVE | COMMUNITY

## 2024-04-16 RX ORDER — PROGESTERONE 200 MG/1
CAPSULE ORAL
Refills: 0 | Status: ACTIVE | COMMUNITY

## 2024-04-16 NOTE — PHYSICAL EXAM
[Alert] : alert [No Lymphadenopathy] : no lymphadenopathy [Soft] : soft [Non-tender] : non-tender [Non-distended] : non-distended [No HSM] : No HSM [No Mass] : no mass [FreeTextEntry6] : Large and  pendulous. fibrocystic and glandular.  Nontender. no masses or lymphadenopathy [Vulvar Atrophy] : vulvar atrophy [Labia Minora] : normal [Normal] : normal [Atrophy] : atrophy [Absent] : absent [Uterine Adnexae] : normal [FreeTextEntry1] : 1 cm sebaceous cyst noted at the mid aspect of the right labium majora [FreeTextEntry8] : No masses

## 2024-04-16 NOTE — HISTORY OF PRESENT ILLNESS
[Patient reported mammogram was normal] : Patient reported mammogram was normal [postmenopausal] : postmenopausal [Y] : Positive pregnancy history [Yes] : yes [No] : Patient does not have concerns regarding sex [Previously active] : previously active [Men] : men [TextBox_4] : 69-year-old  7 para 1-0-6-1 postmenopausal presents for an annual examination.  Review of systems are negative [Mammogramdate] : 12/04/2023 [BoneDensityDate] : 09/15/22 [TextBox_43] : due for one [PGxTotal] : 7 [[facility] CenterxFulerm] : 1 [[facility] Care Centeriving] : 1 [FreeTextEntry1] : 6 miscarriaged [TextBox_9] : 11 [TextBox_6] : none

## 2024-04-16 NOTE — PLAN
[FreeTextEntry1] : Wellness exam.  Normal physical examination.  Status post mammography November 2023.  Recommend follow-up in November of this year.  Recommend follow-up bone density scan in September.  Recommend follow-up colonoscopy Right labial sebaceous cyst.  Patient requests excision and drainage will schedule a separate appointment The patient is presently on an estradiol cream and progesterone pill prescribed by another physician she was advised that starting hormone replacement therapy after age 60 is not recommended.  She however reports relief of vaginal dryness symptoms

## 2024-04-17 ENCOUNTER — NON-APPOINTMENT (OUTPATIENT)
Age: 69
End: 2024-04-17

## 2024-04-17 DIAGNOSIS — Z83.3 FAMILY HISTORY OF DIABETES MELLITUS: ICD-10-CM

## 2024-04-17 DIAGNOSIS — Z80.3 FAMILY HISTORY OF MALIGNANT NEOPLASM OF BREAST: ICD-10-CM

## 2024-04-17 DIAGNOSIS — Z92.89 PERSONAL HISTORY OF OTHER MEDICAL TREATMENT: ICD-10-CM

## 2024-04-17 DIAGNOSIS — Z82.49 FAMILY HISTORY OF ISCHEMIC HEART DISEASE AND OTHER DISEASES OF THE CIRCULATORY SYSTEM: ICD-10-CM

## 2024-04-17 RX ORDER — LEVOTHYROXINE SODIUM 137 UG/1
TABLET ORAL
Refills: 0 | Status: ACTIVE | COMMUNITY

## 2024-04-23 ENCOUNTER — APPOINTMENT (OUTPATIENT)
Dept: OBGYN | Facility: CLINIC | Age: 69
End: 2024-04-23
Payer: MEDICARE

## 2024-04-23 VITALS — BODY MASS INDEX: 36.88 KG/M2 | HEIGHT: 67 IN | WEIGHT: 235 LBS

## 2024-04-23 DIAGNOSIS — N90.7 VULVAR CYST: ICD-10-CM

## 2024-04-23 PROCEDURE — 56605 BIOPSY OF VULVA/PERINEUM: CPT

## 2024-04-23 NOTE — ASSESSMENT
[FreeTextEntry1] : Patient presents for excision of a large right vulvar sebaceous cyst.  Patient notes intermittent right vulvar pain and discomfort.  Physical exam: External genitalia are noted to be atrophic there is an approximately 1 cm cyst with sebaceous material noted at the mid aspect of the right labia majora.  It is smaller approximately 1 mm cyst is noted inferior to the larger cyst.  In addition approximately three, 2 to 3 mm cyst with sebaceous material are noted involving the left labia majora.  Procedure the patient was consented for excision of a vulvar cyst. The right labia majora was washed with a Betadine solution.  2% lidocaine was injected for anesthesia.  A scalpel was utilized to make an incision over the cyst.  A plane was developed between the mucosa and the cyst wall. No sebaceous material was expressed which was sent to pathology.  The cyst wall was also completely excised.  A 4-0 Vicryl suture was placed for hemostasis which was secured.  The patient tolerated procedure well.  Proper hygiene  and restrictions were reviewed.  Pathology pending pending.

## 2024-04-24 ENCOUNTER — NON-APPOINTMENT (OUTPATIENT)
Age: 69
End: 2024-04-24

## 2024-04-24 NOTE — H&P ADULT - NSICDXFAMHXPERTINENTNEGATIVE_GEN_A_CORE_FT
HTN, DM
[FreeTextEntry1] : Mr. Oscar Reyna is a 81-year-old male with a diagnosis of high risk prostate adenocarcinoma, pretreatment PSA: 9.9 ng/mL, [place] Score : 8 (4+4), 4/15 cores positive with 60% involvement, cT1c (T3a on MRI). Bone scan shows equivocal bone lesion in L4. He was initially seen by  on 01/18/2022. He was treated to Prostate/SV/LN with 26 Fx or 7020cGy from 08/25/2022 - 10/04/2022. On 18 months of ADT with . On Orgovyx tablets in 04/2022.and completed 10/2023.   (Urologist: )  PSA Trend: ng/mL  06/03/2019: 6.52 ng/mL 12/09/2019: 6.63 ng/mL 08/17/2020: 7.05 ng/mL 03/22/2021: 7.81 ng/mL 09/20/2021: 9.90 ng/mL 02/07/2022:11.40 ng/mL 05/23/2022: 2.95 ng/mL     Testosterone-5.9ng/dL 12/08/2022: 0.03 ng/mL 04/17/2023: <0.01 ng/mL- Testosterone 7.7ng/dL 10/23/2023: <0.01ng/mL-  Testosterone: <2.5ng/dL 4/18/2024:   <0.01ng/mL  Patient is here for follow up. _______ He complains of nocturia x 2-3, not on Flomax. He also has incomplete emptying, urinary frequency, intermittency and weak stream.  No bowel movement issues. He is not sexually active. He recently completed ADT about 5 weeks ago. His energy level has improved since being off of ADT. He denies having hot flashes.

## 2024-05-01 LAB — CORE LAB BIOPSY: NORMAL

## 2024-05-02 ENCOUNTER — TRANSCRIPTION ENCOUNTER (OUTPATIENT)
Age: 69
End: 2024-05-02

## 2024-05-31 DIAGNOSIS — Z00.00 ENCOUNTER FOR GENERAL ADULT MEDICAL EXAMINATION W/OUT ABNORMAL FINDINGS: ICD-10-CM

## 2024-06-04 NOTE — H&P ADULT - PSH
[Cooperative] : cooperative [Anxious] : anxious [Full] : full [Clear] : clear [Linear/Goal Directed] : linear/goal directed [Memory] : memory [Average] : average [WNL] : within normal limits H/O abdominal hysterectomy    H/O  section    H/O partial nephrectomy  left 2007

## 2024-06-06 LAB
25(OH)D3 SERPL-MCNC: 74.9 NG/ML
ALBUMIN SERPL ELPH-MCNC: 3.9 G/DL
ALP BLD-CCNC: 63 U/L
ALT SERPL-CCNC: 10 U/L
ANION GAP SERPL CALC-SCNC: 12 MMOL/L
AST SERPL-CCNC: 14 U/L
BASOPHILS # BLD AUTO: 0.02 K/UL
BASOPHILS NFR BLD AUTO: 0.3 %
BILIRUB SERPL-MCNC: 0.2 MG/DL
BUN SERPL-MCNC: 23 MG/DL
CALCIUM SERPL-MCNC: 9.5 MG/DL
CHLORIDE SERPL-SCNC: 106 MMOL/L
CHOLEST SERPL-MCNC: 216 MG/DL
CO2 SERPL-SCNC: 24 MMOL/L
CREAT SERPL-MCNC: 1.62 MG/DL
EGFR: 34 ML/MIN/1.73M2
EOSINOPHIL # BLD AUTO: 0.17 K/UL
EOSINOPHIL NFR BLD AUTO: 2.3 %
ESTIMATED AVERAGE GLUCOSE: 114 MG/DL
GLUCOSE SERPL-MCNC: 81 MG/DL
HBA1C MFR BLD HPLC: 5.6 %
HCT VFR BLD CALC: 36.9 %
HDLC SERPL-MCNC: 76 MG/DL
HGB BLD-MCNC: 11.5 G/DL
IMM GRANULOCYTES NFR BLD AUTO: 0.1 %
LDLC SERPL CALC-MCNC: 127 MG/DL
LYMPHOCYTES # BLD AUTO: 1.96 K/UL
LYMPHOCYTES NFR BLD AUTO: 26.5 %
MAN DIFF?: NORMAL
MCHC RBC-ENTMCNC: 29.9 PG
MCHC RBC-ENTMCNC: 31.2 GM/DL
MCV RBC AUTO: 96.1 FL
MONOCYTES # BLD AUTO: 0.48 K/UL
MONOCYTES NFR BLD AUTO: 6.5 %
NEUTROPHILS # BLD AUTO: 4.75 K/UL
NEUTROPHILS NFR BLD AUTO: 64.3 %
NONHDLC SERPL-MCNC: 141 MG/DL
PLATELET # BLD AUTO: 294 K/UL
POTASSIUM SERPL-SCNC: 3.8 MMOL/L
PROT SERPL-MCNC: 7.5 G/DL
RBC # BLD: 3.84 M/UL
RBC # FLD: 14.7 %
SODIUM SERPL-SCNC: 142 MMOL/L
TRIGL SERPL-MCNC: 76 MG/DL
WBC # FLD AUTO: 7.39 K/UL

## 2024-06-27 ENCOUNTER — OFFICE (OUTPATIENT)
Dept: URBAN - METROPOLITAN AREA CLINIC 109 | Facility: CLINIC | Age: 69
Setting detail: OPHTHALMOLOGY
End: 2024-06-27
Payer: MEDICARE

## 2024-06-27 DIAGNOSIS — H35.362: ICD-10-CM

## 2024-06-27 DIAGNOSIS — H16.223: ICD-10-CM

## 2024-06-27 DIAGNOSIS — E05.00: ICD-10-CM

## 2024-06-27 DIAGNOSIS — H25.13: ICD-10-CM

## 2024-06-27 PROCEDURE — 92014 COMPRE OPH EXAM EST PT 1/>: CPT | Performed by: OPHTHALMOLOGY

## 2024-06-27 ASSESSMENT — CONFRONTATIONAL VISUAL FIELD TEST (CVF)
OS_FINDINGS: FULL
OD_FINDINGS: FULL

## 2024-06-27 ASSESSMENT — LID EXAM ASSESSMENTS
OS_MEIBOMITIS: LLL LUL 1+
OD_MEIBOMITIS: RLL RUL 1+

## 2024-07-15 ENCOUNTER — OFFICE (OUTPATIENT)
Dept: URBAN - METROPOLITAN AREA CLINIC 109 | Facility: CLINIC | Age: 69
Setting detail: OPHTHALMOLOGY
End: 2024-07-15
Payer: MEDICARE

## 2024-07-15 DIAGNOSIS — E05.00: ICD-10-CM

## 2024-07-15 DIAGNOSIS — H25.13: ICD-10-CM

## 2024-07-15 DIAGNOSIS — H35.362: ICD-10-CM

## 2024-07-15 DIAGNOSIS — H16.223: ICD-10-CM

## 2024-07-15 PROCEDURE — 99214 OFFICE O/P EST MOD 30 MIN: CPT | Performed by: OPHTHALMOLOGY

## 2024-07-15 ASSESSMENT — CONFRONTATIONAL VISUAL FIELD TEST (CVF)
OS_FINDINGS: FULL
OD_FINDINGS: FULL

## 2024-07-15 ASSESSMENT — LID EXAM ASSESSMENTS
OS_MEIBOMITIS: LLL LUL 1+
OD_MEIBOMITIS: RLL RUL 1+

## 2024-10-01 ENCOUNTER — APPOINTMENT (OUTPATIENT)
Dept: NEPHROLOGY | Facility: CLINIC | Age: 69
End: 2024-10-01
Payer: MEDICARE

## 2024-10-01 VITALS
RESPIRATION RATE: 16 BRPM | OXYGEN SATURATION: 96 % | HEART RATE: 84 BPM | DIASTOLIC BLOOD PRESSURE: 78 MMHG | BODY MASS INDEX: 34.53 KG/M2 | WEIGHT: 220 LBS | SYSTOLIC BLOOD PRESSURE: 124 MMHG | HEIGHT: 67 IN

## 2024-10-01 DIAGNOSIS — N18.4 CHRONIC KIDNEY DISEASE, STAGE 4 (SEVERE): ICD-10-CM

## 2024-10-01 DIAGNOSIS — I10 ESSENTIAL (PRIMARY) HYPERTENSION: ICD-10-CM

## 2024-10-01 PROCEDURE — 99213 OFFICE O/P EST LOW 20 MIN: CPT

## 2024-10-01 NOTE — REASON FOR VISIT
[Follow-Up] : a follow-up visit [FreeTextEntry1] : CKD stage IV. Status post partial nephrectomy for cystic RCC.

## 2024-10-01 NOTE — ASSESSMENT
[FreeTextEntry1] : 1. Stable no proteinuric CKD related to loss of renal mass from surgery.  Discussed w/ the patient that there is very low risk of progression of renal dysfunction. Discussed conservative measures like BP control, weigh loss to avoid DM and avoidance of excessive use o NSIAIDs.  Routine labs to include renal function every 6 months and renal sonogram every year.  2. Hypertension: BP is well controlled.  Return if needed.  Follow up w/ PCP.

## 2024-10-01 NOTE — HISTORY OF PRESENT ILLNESS
[FreeTextEntry1] : The patient is doing well and has no complaints. She is exercising and dieting and determined to continue to lose weight. No flank pain or hematuria. Follow up w/ Dr. Gonsalves recently.  Her internist is Dr. Vu Dykes. Labs in June were stable.  The patient is here for follow up of non proteinuric CKD related to partial nephrectomy.

## 2024-10-01 NOTE — PHYSICAL EXAM
[General Appearance - Alert] : alert [General Appearance - In No Acute Distress] : in no acute distress [Sclera] : the sclera and conjunctiva were normal [Hearing Threshold Finger Rub Not [place]] : hearing was normal [Jugular Venous Distention Increased] : there was no jugular-venous distention [Auscultation Breath Sounds / Voice Sounds] : lungs were clear to auscultation bilaterally [Heart Sounds] : normal S1 and S2 [Murmurs] : no murmurs [Edema] : there was no peripheral edema [Abdomen Tenderness] : non-tender [Urinary Bladder Findings] : the bladder was normal on palpation [No CVA Tenderness] : no ~M costovertebral angle tenderness [FreeTextEntry1] : No flank discomfort noted [Abnormal Walk] : normal gait [] : no rash [No Focal Deficits] : no focal deficits [Oriented To Time, Place, And Person] : oriented to person, place, and time

## 2024-12-02 ENCOUNTER — OFFICE (OUTPATIENT)
Dept: URBAN - METROPOLITAN AREA CLINIC 109 | Facility: CLINIC | Age: 69
Setting detail: OPHTHALMOLOGY
End: 2024-12-02
Payer: MEDICARE

## 2024-12-02 DIAGNOSIS — H35.362: ICD-10-CM

## 2024-12-02 DIAGNOSIS — H25.11: ICD-10-CM

## 2024-12-02 DIAGNOSIS — H25.13: ICD-10-CM

## 2024-12-02 PROCEDURE — 92136 OPHTHALMIC BIOMETRY: CPT | Mod: RT | Performed by: OPHTHALMOLOGY

## 2024-12-02 PROCEDURE — 99213 OFFICE O/P EST LOW 20 MIN: CPT | Performed by: OPHTHALMOLOGY

## 2024-12-02 PROCEDURE — 92134 CPTRZ OPH DX IMG PST SGM RTA: CPT | Performed by: OPHTHALMOLOGY

## 2024-12-02 ASSESSMENT — KERATOMETRY
OD_K2POWER_DIOPTERS: 44.25
OS_CYLPOWER_DEGREES: 0.5
OD_AXISANGLE_DEGREES: 011
OD_CYLPOWER_DEGREES: 0.5
OS_K2POWER_DIOPTERS: 44.75
OS_AXISANGLE_DEGREES: 57
OD_K2POWER_DIOPTERS: 44.25
OS_CYLAXISANGLE_DEGREES: 147
OS_AXISANGLE2_DEGREES: 147
OS_K1POWER_DIOPTERS: 44.25
OS_K1K2_AVERAGE: 44.5
OS_AXISANGLE_DEGREES: 147
OS_K2POWER_DIOPTERS: 44.75
OD_K1POWER_DIOPTERS: 43.75
OD_K1POWER_DIOPTERS: 43.75
OS_K1POWER_DIOPTERS: 44.25
OD_AXISANGLE2_DEGREES: 011
OD_AXISANGLE_DEGREES: 101
OD_CYLAXISANGLE_DEGREES: 011
OD_K1K2_AVERAGE: 44

## 2024-12-02 ASSESSMENT — REFRACTION_CURRENTRX
OS_OVR_VA: 20/
OD_OVR_VA: 20/
OS_SPHERE: +1.50
OD_VPRISM_DIRECTION: PROGS
OS_OVR_VA: 20/
OS_SPHERE: +1.50
OS_VPRISM_DIRECTION: PROGS
OD_ADD: +2.50
OD_SPHERE: +1.50
OD_OVR_VA: 20/
OS_ADD: +2.00
OD_SPHERE: +1.50
OD_ADD: +2.00
OS_ADD: +2.50

## 2024-12-02 ASSESSMENT — REFRACTION_MANIFEST
OS_ADD: +2.50
OS_VA1: 20/NI
OS_SPHERE: +2.50
OD_VA1: 20/NI
OD_ADD: +2.50
OS_SPHERE: +3.75
OD_SPHERE: +3.75
OD_SPHERE: +2.00

## 2024-12-02 ASSESSMENT — CONFRONTATIONAL VISUAL FIELD TEST (CVF)
OS_FINDINGS: FULL
OD_FINDINGS: FULL

## 2024-12-02 ASSESSMENT — REFRACTION_AUTOREFRACTION
OD_AXIS: 047
OS_SPHERE: +2.50
OD_CYLINDER: -0.50
OS_AXIS: 070
OD_SPHERE: +2.00
OS_CYLINDER: -0.50

## 2024-12-02 ASSESSMENT — VISUAL ACUITY
OD_BCVA: 20/30-1
OS_BCVA: 20/25-2

## 2024-12-02 ASSESSMENT — LID EXAM ASSESSMENTS
OD_MEIBOMITIS: RLL RUL 1+
OS_MEIBOMITIS: LLL LUL 1+

## 2024-12-02 ASSESSMENT — TEAR BREAK UP TIME (TBUT)
OS_TBUT: 1+
OD_TBUT: 1+

## 2024-12-02 ASSESSMENT — TONOMETRY
OS_IOP_MMHG: 13
OD_IOP_MMHG: 11

## 2024-12-19 ENCOUNTER — APPOINTMENT (OUTPATIENT)
Dept: INTERNAL MEDICINE | Facility: CLINIC | Age: 69
End: 2024-12-19
Payer: MEDICARE

## 2024-12-19 ENCOUNTER — NON-APPOINTMENT (OUTPATIENT)
Age: 69
End: 2024-12-19

## 2024-12-19 ENCOUNTER — OUTPATIENT (OUTPATIENT)
Dept: OUTPATIENT SERVICES | Facility: HOSPITAL | Age: 69
LOS: 1 days | End: 2024-12-19

## 2024-12-19 VITALS
OXYGEN SATURATION: 96 % | WEIGHT: 220 LBS | HEART RATE: 88 BPM | DIASTOLIC BLOOD PRESSURE: 98 MMHG | SYSTOLIC BLOOD PRESSURE: 148 MMHG | BODY MASS INDEX: 34.53 KG/M2 | HEIGHT: 67 IN

## 2024-12-19 DIAGNOSIS — I10 ESSENTIAL (PRIMARY) HYPERTENSION: ICD-10-CM

## 2024-12-19 DIAGNOSIS — Z01.818 ENCOUNTER FOR OTHER PREPROCEDURAL EXAMINATION: ICD-10-CM

## 2024-12-19 DIAGNOSIS — I45.2 BIFASCICULAR BLOCK: ICD-10-CM

## 2024-12-19 DIAGNOSIS — C64.9 MALIGNANT NEOPLASM OF UNSPECIFIED KIDNEY, EXCEPT RENAL PELVIS: ICD-10-CM

## 2024-12-19 DIAGNOSIS — E66.01 MORBID (SEVERE) OBESITY DUE TO EXCESS CALORIES: ICD-10-CM

## 2024-12-19 DIAGNOSIS — H26.9 UNSPECIFIED CATARACT: ICD-10-CM

## 2024-12-19 DIAGNOSIS — N18.4 CHRONIC KIDNEY DISEASE, STAGE 4 (SEVERE): ICD-10-CM

## 2024-12-19 PROCEDURE — 93010 ELECTROCARDIOGRAM REPORT: CPT

## 2024-12-19 PROCEDURE — 99214 OFFICE O/P EST MOD 30 MIN: CPT

## 2024-12-19 PROCEDURE — G0463: CPT

## 2024-12-19 PROCEDURE — G2211 COMPLEX E/M VISIT ADD ON: CPT

## 2024-12-23 ENCOUNTER — TRANSCRIPTION ENCOUNTER (OUTPATIENT)
Age: 69
End: 2024-12-23

## 2024-12-23 LAB
ANION GAP SERPL CALC-SCNC: 23 MMOL/L
BASOPHILS # BLD AUTO: 0.02 K/UL
BASOPHILS NFR BLD AUTO: 0.3 %
BUN SERPL-MCNC: 18 MG/DL
CALCIUM SERPL-MCNC: 10 MG/DL
CHLORIDE SERPL-SCNC: 105 MMOL/L
CO2 SERPL-SCNC: 14 MMOL/L
CREAT SERPL-MCNC: 1.69 MG/DL
EGFR: 32 ML/MIN/1.73M2
EOSINOPHIL # BLD AUTO: 0.12 K/UL
EOSINOPHIL NFR BLD AUTO: 1.6 %
GLUCOSE SERPL-MCNC: 69 MG/DL
HCT VFR BLD CALC: 41.2 %
HGB BLD-MCNC: 12.7 G/DL
IMM GRANULOCYTES NFR BLD AUTO: 0.3 %
LYMPHOCYTES # BLD AUTO: 2.07 K/UL
LYMPHOCYTES NFR BLD AUTO: 27.4 %
MAN DIFF?: NORMAL
MCHC RBC-ENTMCNC: 30 PG
MCHC RBC-ENTMCNC: 30.8 G/DL
MCV RBC AUTO: 97.2 FL
MONOCYTES # BLD AUTO: 0.42 K/UL
MONOCYTES NFR BLD AUTO: 5.6 %
NEUTROPHILS # BLD AUTO: 4.91 K/UL
NEUTROPHILS NFR BLD AUTO: 64.8 %
PLATELET # BLD AUTO: 276 K/UL
POTASSIUM SERPL-SCNC: 5 MMOL/L
RBC # BLD: 4.24 M/UL
RBC # FLD: 14.1 %
SODIUM SERPL-SCNC: 142 MMOL/L
WBC # FLD AUTO: 7.56 K/UL

## 2025-01-07 ENCOUNTER — AMBULATORY SURGERY CENTER (OUTPATIENT)
Dept: URBAN - METROPOLITAN AREA SURGERY 19 | Facility: SURGERY | Age: 70
Setting detail: OPHTHALMOLOGY
End: 2025-01-07
Payer: MEDICARE

## 2025-01-07 DIAGNOSIS — H25.11: ICD-10-CM

## 2025-01-07 DIAGNOSIS — H52.211: ICD-10-CM

## 2025-01-07 PROCEDURE — 66984 XCAPSL CTRC RMVL W/O ECP: CPT | Mod: RT | Performed by: OPHTHALMOLOGY

## 2025-01-07 PROCEDURE — VIVITY VIVITY: Performed by: OPHTHALMOLOGY

## 2025-01-08 ENCOUNTER — OFFICE (OUTPATIENT)
Dept: URBAN - METROPOLITAN AREA CLINIC 109 | Facility: CLINIC | Age: 70
Setting detail: OPHTHALMOLOGY
End: 2025-01-08
Payer: MEDICARE

## 2025-01-08 ENCOUNTER — RX ONLY (RX ONLY)
Age: 70
End: 2025-01-08

## 2025-01-08 DIAGNOSIS — Z96.1: ICD-10-CM

## 2025-01-08 PROBLEM — H25.12 CATARACT SENILE NUCLEAR SCLEROSIS; LEFT EYE,: Status: ACTIVE | Noted: 2024-12-02

## 2025-01-08 PROCEDURE — 99024 POSTOP FOLLOW-UP VISIT: CPT | Performed by: OPTOMETRIST

## 2025-01-08 ASSESSMENT — REFRACTION_CURRENTRX
OS_ADD: +2.50
OD_OVR_VA: 20/
OS_SPHERE: +1.50
OD_ADD: +2.00
OS_VPRISM_DIRECTION: PROGS
OD_SPHERE: +1.50
OS_OVR_VA: 20/
OD_OVR_VA: 20/
OD_VPRISM_DIRECTION: PROGS
OS_ADD: +2.00
OS_SPHERE: +1.50
OD_ADD: +2.50
OS_OVR_VA: 20/
OD_SPHERE: +1.50

## 2025-01-08 ASSESSMENT — REFRACTION_MANIFEST
OD_SPHERE: +3.75
OD_VA1: 20/NI
OS_SPHERE: +3.75
OS_VA1: 20/NI
OS_SPHERE: +2.50
OD_ADD: +2.50
OS_ADD: +2.50
OD_SPHERE: +2.00

## 2025-01-08 ASSESSMENT — VISUAL ACUITY
OS_BCVA: 20/30+2
OD_BCVA: 20/30-1

## 2025-01-08 ASSESSMENT — KERATOMETRY
OS_AXISANGLE_DEGREES: 147
OD_K2POWER_DIOPTERS: 44.25
OS_K1POWER_DIOPTERS: 44.25
OD_AXISANGLE_DEGREES: 011
OD_K1POWER_DIOPTERS: 43.75
OS_K2POWER_DIOPTERS: 44.75

## 2025-01-08 ASSESSMENT — LID EXAM ASSESSMENTS
OS_MEIBOMITIS: LLL LUL 1+
OD_MEIBOMITIS: RLL RUL 1+

## 2025-01-08 ASSESSMENT — TEAR BREAK UP TIME (TBUT)
OD_TBUT: 1+
OS_TBUT: 1+

## 2025-01-08 ASSESSMENT — CONFRONTATIONAL VISUAL FIELD TEST (CVF)
OS_FINDINGS: FULL
OD_FINDINGS: FULL

## 2025-01-08 ASSESSMENT — TONOMETRY
OS_IOP_MMHG: 11
OD_IOP_MMHG: 12

## 2025-01-16 DIAGNOSIS — I10 ESSENTIAL (PRIMARY) HYPERTENSION: ICD-10-CM

## 2025-01-28 ENCOUNTER — OFFICE (OUTPATIENT)
Dept: URBAN - METROPOLITAN AREA CLINIC 109 | Facility: CLINIC | Age: 70
Setting detail: OPHTHALMOLOGY
End: 2025-01-28
Payer: MEDICARE

## 2025-01-28 DIAGNOSIS — Z96.1: ICD-10-CM

## 2025-01-28 PROCEDURE — 99024 POSTOP FOLLOW-UP VISIT: CPT | Performed by: OPTOMETRIST

## 2025-01-28 ASSESSMENT — REFRACTION_AUTOREFRACTION
OS_SPHERE: +2.75
OS_AXIS: 084
OD_SPHERE: 0.00
OD_CYLINDER: -0.50
OS_CYLINDER: -0.75
OD_AXIS: 127

## 2025-01-28 ASSESSMENT — REFRACTION_CURRENTRX
OD_ADD: +2.00
OD_VPRISM_DIRECTION: PROGS
OS_VPRISM_DIRECTION: PROGS
OD_OVR_VA: 20/
OS_OVR_VA: 20/
OD_OVR_VA: 20/
OS_ADD: +2.50
OD_SPHERE: +1.50
OS_OVR_VA: 20/
OS_SPHERE: +1.50
OD_ADD: +2.50
OS_ADD: +2.00
OD_SPHERE: +1.50
OS_SPHERE: +1.50

## 2025-01-28 ASSESSMENT — REFRACTION_MANIFEST
OD_VA1: 20/NI
OS_SPHERE: +2.50
OS_SPHERE: +3.75
OS_ADD: +2.50
OD_SPHERE: +3.75
OD_SPHERE: +2.00
OS_VA1: 20/NI
OD_ADD: +2.50

## 2025-01-28 ASSESSMENT — KERATOMETRY
OS_K1POWER_DIOPTERS: 44.25
OD_AXISANGLE_DEGREES: 011
OS_AXISANGLE_DEGREES: 147
OD_K1POWER_DIOPTERS: 43.75
OS_K2POWER_DIOPTERS: 44.75
OD_K2POWER_DIOPTERS: 44.25

## 2025-01-28 ASSESSMENT — VISUAL ACUITY
OD_BCVA: 20/30-1
OS_BCVA: 20/20

## 2025-01-28 ASSESSMENT — TEAR BREAK UP TIME (TBUT)
OD_TBUT: 1+
OS_TBUT: 1+

## 2025-01-28 ASSESSMENT — TONOMETRY: OS_IOP_MMHG: 11

## 2025-01-29 ENCOUNTER — APPOINTMENT (OUTPATIENT)
Dept: INTERNAL MEDICINE | Facility: CLINIC | Age: 70
End: 2025-01-29
Payer: MEDICARE

## 2025-01-29 ENCOUNTER — OUTPATIENT (OUTPATIENT)
Dept: OUTPATIENT SERVICES | Facility: HOSPITAL | Age: 70
LOS: 1 days | End: 2025-01-29
Payer: MEDICARE

## 2025-01-29 DIAGNOSIS — R05.9 COUGH, UNSPECIFIED: ICD-10-CM

## 2025-01-29 PROCEDURE — 99213 OFFICE O/P EST LOW 20 MIN: CPT

## 2025-01-29 PROCEDURE — G2211 COMPLEX E/M VISIT ADD ON: CPT

## 2025-01-29 PROCEDURE — G0463: CPT

## 2025-01-30 DIAGNOSIS — I10 ESSENTIAL (PRIMARY) HYPERTENSION: ICD-10-CM

## 2025-02-03 DIAGNOSIS — R05.9 COUGH, UNSPECIFIED: ICD-10-CM

## 2025-02-27 NOTE — ED PROVIDER NOTE - TRANSFER CONSULTATION #1
Patient tolerated procedure well   Transported to room 328   Report given to Merly RN   Site CDI   will see patient in ED

## 2025-03-01 ENCOUNTER — APPOINTMENT (OUTPATIENT)
Dept: CT IMAGING | Facility: CLINIC | Age: 70
End: 2025-03-01

## 2025-03-01 ENCOUNTER — APPOINTMENT (OUTPATIENT)
Dept: ULTRASOUND IMAGING | Facility: CLINIC | Age: 70
End: 2025-03-01
Payer: MEDICARE

## 2025-03-01 PROCEDURE — 76775 US EXAM ABDO BACK WALL LIM: CPT

## 2025-03-01 PROCEDURE — 71250 CT THORAX DX C-: CPT

## 2025-03-20 NOTE — PATIENT PROFILE ADULT - NSTRANSFERBELONGINGSRESP_GEN_A_NUR
Reta Vincent PA EASTGATE USA Health Providence 
yes

## 2025-03-26 ENCOUNTER — APPOINTMENT (OUTPATIENT)
Dept: INTERNAL MEDICINE | Facility: CLINIC | Age: 70
End: 2025-03-26
Payer: MEDICARE

## 2025-03-26 ENCOUNTER — OUTPATIENT (OUTPATIENT)
Dept: OUTPATIENT SERVICES | Facility: HOSPITAL | Age: 70
LOS: 1 days | End: 2025-03-26
Payer: MEDICARE

## 2025-03-26 VITALS — SYSTOLIC BLOOD PRESSURE: 136 MMHG | HEART RATE: 70 BPM | DIASTOLIC BLOOD PRESSURE: 78 MMHG | RESPIRATION RATE: 14 BRPM

## 2025-03-26 DIAGNOSIS — H93.11 TINNITUS, RIGHT EAR: ICD-10-CM

## 2025-03-26 DIAGNOSIS — H02.539 EYELID RETRACTION UNSPECIFIED EYE, UNSPECIFIED LID: ICD-10-CM

## 2025-03-26 DIAGNOSIS — Z01.818 ENCOUNTER FOR OTHER PREPROCEDURAL EXAMINATION: ICD-10-CM

## 2025-03-26 DIAGNOSIS — I10 ESSENTIAL (PRIMARY) HYPERTENSION: ICD-10-CM

## 2025-03-26 DIAGNOSIS — Z00.00 ENCOUNTER FOR GENERAL ADULT MEDICAL EXAMINATION W/OUT ABNORMAL FINDINGS: ICD-10-CM

## 2025-03-26 DIAGNOSIS — N18.30 CHRONIC KIDNEY DISEASE, STAGE 3 UNSPECIFIED: ICD-10-CM

## 2025-03-26 PROCEDURE — G0439: CPT

## 2025-03-27 DIAGNOSIS — E03.9 HYPOTHYROIDISM, UNSPECIFIED: ICD-10-CM

## 2025-03-27 DIAGNOSIS — N18.4 CHRONIC KIDNEY DISEASE, STAGE 4 (SEVERE): ICD-10-CM

## 2025-03-27 LAB
ALBUMIN SERPL ELPH-MCNC: 4 G/DL
ALP BLD-CCNC: 73 U/L
ALT SERPL-CCNC: 8 U/L
ANION GAP SERPL CALC-SCNC: 17 MMOL/L
AST SERPL-CCNC: 19 U/L
BASOPHILS # BLD AUTO: 0.03 K/UL
BASOPHILS NFR BLD AUTO: 0.3 %
BILIRUB SERPL-MCNC: 0.2 MG/DL
BUN SERPL-MCNC: 19 MG/DL
CALCIUM SERPL-MCNC: 10 MG/DL
CHLORIDE SERPL-SCNC: 106 MMOL/L
CHOLEST SERPL-MCNC: 224 MG/DL
CO2 SERPL-SCNC: 15 MMOL/L
CREAT SERPL-MCNC: 1.48 MG/DL
CREAT SPEC-SCNC: 203 MG/DL
CREAT/PROT UR: 0.2 RATIO
EGFRCR SERPLBLD CKD-EPI 2021: 38 ML/MIN/1.73M2
EOSINOPHIL # BLD AUTO: 0.14 K/UL
EOSINOPHIL NFR BLD AUTO: 1.6 %
ESTIMATED AVERAGE GLUCOSE: 111 MG/DL
GLUCOSE SERPL-MCNC: 79 MG/DL
HBA1C MFR BLD HPLC: 5.5 %
HCT VFR BLD CALC: 39.5 %
HDLC SERPL-MCNC: 79 MG/DL
HGB BLD-MCNC: 12.6 G/DL
IMM GRANULOCYTES NFR BLD AUTO: 0.2 %
IRON SATN MFR SERPL: NORMAL %
IRON SERPL-MCNC: NORMAL UG/DL
LDLC SERPL-MCNC: 127 MG/DL
LYMPHOCYTES # BLD AUTO: 2.74 K/UL
LYMPHOCYTES NFR BLD AUTO: 30.6 %
MAN DIFF?: NORMAL
MCHC RBC-ENTMCNC: 31 PG
MCHC RBC-ENTMCNC: 31.9 G/DL
MCV RBC AUTO: 97.1 FL
MONOCYTES # BLD AUTO: 0.54 K/UL
MONOCYTES NFR BLD AUTO: 6 %
NEUTROPHILS # BLD AUTO: 5.47 K/UL
NEUTROPHILS NFR BLD AUTO: 61.3 %
NONHDLC SERPL-MCNC: 146 MG/DL
PHOSPHATE SERPL-MCNC: 3.8 MG/DL
PLATELET # BLD AUTO: 336 K/UL
POTASSIUM SERPL-SCNC: 5 MMOL/L
PROT SERPL-MCNC: 8.2 G/DL
PROT UR-MCNC: 40 MG/DL
RBC # BLD: 4.07 M/UL
RBC # FLD: 14 %
SODIUM SERPL-SCNC: 138 MMOL/L
TIBC SERPL-MCNC: NORMAL UG/DL
TRIGL SERPL-MCNC: 108 MG/DL
UIBC SERPL-MCNC: NORMAL UG/DL
WBC # FLD AUTO: 8.94 K/UL

## 2025-04-01 DIAGNOSIS — Z00.00 ENCOUNTER FOR GENERAL ADULT MEDICAL EXAMINATION WITHOUT ABNORMAL FINDINGS: ICD-10-CM

## 2025-04-01 DIAGNOSIS — E03.9 HYPOTHYROIDISM, UNSPECIFIED: ICD-10-CM

## 2025-04-01 DIAGNOSIS — N18.4 CHRONIC KIDNEY DISEASE, STAGE 4 (SEVERE): ICD-10-CM

## 2025-05-19 DIAGNOSIS — Z12.39 ENCOUNTER FOR OTHER SCREENING FOR MALIGNANT NEOPLASM OF BREAST: ICD-10-CM

## 2025-05-19 DIAGNOSIS — Z13.820 ENCOUNTER FOR SCREENING FOR OSTEOPOROSIS: ICD-10-CM

## 2025-05-19 DIAGNOSIS — Z12.11 ENCOUNTER FOR SCREENING FOR MALIGNANT NEOPLASM OF COLON: ICD-10-CM

## 2025-05-19 DIAGNOSIS — Z12.12 ENCOUNTER FOR SCREENING FOR MALIGNANT NEOPLASM OF COLON: ICD-10-CM

## 2025-05-20 ENCOUNTER — APPOINTMENT (OUTPATIENT)
Age: 70
End: 2025-05-20

## 2025-07-28 NOTE — ED ADULT NURSE NOTE - NSSISCREENINGQ3_ED_A_ED
No
CONSTITUTIONAL: NAD  SKIN: Warm dry  HEAD: +3 x 3 X-shaped laceration on vertex, oozing blood  No midline spinal ttp c/t/l  EYES: NL inspection  ENT: MMM  CARD: RRR  RESP: Unlabored respirations  NEURO: Grossly unremarkable  PSYCH: Cooperative, appropriate.